# Patient Record
Sex: MALE | Race: WHITE | NOT HISPANIC OR LATINO | URBAN - METROPOLITAN AREA
[De-identification: names, ages, dates, MRNs, and addresses within clinical notes are randomized per-mention and may not be internally consistent; named-entity substitution may affect disease eponyms.]

---

## 2017-07-25 ENCOUNTER — OUTPATIENT (OUTPATIENT)
Dept: OUTPATIENT SERVICES | Facility: HOSPITAL | Age: 82
LOS: 1 days | End: 2017-07-25
Payer: MEDICARE

## 2017-07-25 VITALS
WEIGHT: 149.91 LBS | DIASTOLIC BLOOD PRESSURE: 63 MMHG | HEIGHT: 63 IN | SYSTOLIC BLOOD PRESSURE: 132 MMHG | OXYGEN SATURATION: 98 % | TEMPERATURE: 98 F | HEART RATE: 76 BPM | RESPIRATION RATE: 18 BRPM

## 2017-07-25 DIAGNOSIS — Z01.810 ENCOUNTER FOR PREPROCEDURAL CARDIOVASCULAR EXAMINATION: ICD-10-CM

## 2017-07-25 DIAGNOSIS — I25.10 ATHEROSCLEROTIC HEART DISEASE OF NATIVE CORONARY ARTERY WITHOUT ANGINA PECTORIS: ICD-10-CM

## 2017-07-25 LAB
ANION GAP SERPL CALC-SCNC: 14 MMOL/L — SIGNIFICANT CHANGE UP (ref 5–17)
APTT BLD: 34.9 SEC — SIGNIFICANT CHANGE UP (ref 27.5–37.4)
BASOPHILS # BLD AUTO: 0 K/UL — SIGNIFICANT CHANGE UP (ref 0–0.2)
BASOPHILS NFR BLD AUTO: 0.4 % — SIGNIFICANT CHANGE UP (ref 0–2)
BUN SERPL-MCNC: 21 MG/DL — HIGH (ref 8–20)
CALCIUM SERPL-MCNC: 9.1 MG/DL — SIGNIFICANT CHANGE UP (ref 8.6–10.2)
CHLORIDE SERPL-SCNC: 101 MMOL/L — SIGNIFICANT CHANGE UP (ref 98–107)
CO2 SERPL-SCNC: 26 MMOL/L — SIGNIFICANT CHANGE UP (ref 22–29)
CREAT SERPL-MCNC: 1.03 MG/DL — SIGNIFICANT CHANGE UP (ref 0.5–1.3)
EOSINOPHIL # BLD AUTO: 0.2 K/UL — SIGNIFICANT CHANGE UP (ref 0–0.5)
EOSINOPHIL NFR BLD AUTO: 2.5 % — SIGNIFICANT CHANGE UP (ref 0–5)
GLUCOSE SERPL-MCNC: 151 MG/DL — HIGH (ref 70–115)
HCT VFR BLD CALC: 41.2 % — LOW (ref 42–52)
HGB BLD-MCNC: 13.6 G/DL — LOW (ref 14–18)
INR BLD: 1.63 RATIO — HIGH (ref 0.88–1.16)
LYMPHOCYTES # BLD AUTO: 1.3 K/UL — SIGNIFICANT CHANGE UP (ref 1–4.8)
LYMPHOCYTES # BLD AUTO: 19.6 % — LOW (ref 20–55)
MCHC RBC-ENTMCNC: 30.2 PG — SIGNIFICANT CHANGE UP (ref 27–31)
MCHC RBC-ENTMCNC: 33 G/DL — SIGNIFICANT CHANGE UP (ref 32–36)
MCV RBC AUTO: 91.4 FL — SIGNIFICANT CHANGE UP (ref 80–94)
MONOCYTES # BLD AUTO: 0.7 K/UL — SIGNIFICANT CHANGE UP (ref 0–0.8)
MONOCYTES NFR BLD AUTO: 10.3 % — HIGH (ref 3–10)
NEUTROPHILS # BLD AUTO: 4.5 K/UL — SIGNIFICANT CHANGE UP (ref 1.8–8)
NEUTROPHILS NFR BLD AUTO: 67.1 % — SIGNIFICANT CHANGE UP (ref 37–73)
PLATELET # BLD AUTO: 164 K/UL — SIGNIFICANT CHANGE UP (ref 150–400)
POTASSIUM SERPL-MCNC: 4.6 MMOL/L — SIGNIFICANT CHANGE UP (ref 3.5–5.3)
POTASSIUM SERPL-SCNC: 4.6 MMOL/L — SIGNIFICANT CHANGE UP (ref 3.5–5.3)
PROTHROM AB SERPL-ACNC: 18.1 SEC — HIGH (ref 9.8–12.7)
RBC # BLD: 4.51 M/UL — LOW (ref 4.6–6.2)
RBC # FLD: 14.3 % — SIGNIFICANT CHANGE UP (ref 11–15.6)
SODIUM SERPL-SCNC: 141 MMOL/L — SIGNIFICANT CHANGE UP (ref 135–145)
WBC # BLD: 6.7 K/UL — SIGNIFICANT CHANGE UP (ref 4.8–10.8)
WBC # FLD AUTO: 6.7 K/UL — SIGNIFICANT CHANGE UP (ref 4.8–10.8)

## 2017-07-25 PROCEDURE — 85027 COMPLETE CBC AUTOMATED: CPT

## 2017-07-25 PROCEDURE — 93005 ELECTROCARDIOGRAM TRACING: CPT

## 2017-07-25 PROCEDURE — G0463: CPT

## 2017-07-25 PROCEDURE — 93010 ELECTROCARDIOGRAM REPORT: CPT

## 2017-07-25 PROCEDURE — 85730 THROMBOPLASTIN TIME PARTIAL: CPT

## 2017-07-25 PROCEDURE — 36415 COLL VENOUS BLD VENIPUNCTURE: CPT

## 2017-07-25 PROCEDURE — 85610 PROTHROMBIN TIME: CPT

## 2017-07-25 PROCEDURE — 80048 BASIC METABOLIC PNL TOTAL CA: CPT

## 2017-07-25 NOTE — ASU PATIENT PROFILE, ADULT - PMH
Afib    Arthritis    Diabetes mellitus    HTN (hypertension)    Hypertension    Pacemaker    Stented coronary artery  ELLIE x 1  (2011) -- [facility]  Trigger finger Afib    Arthritis    CAD (coronary artery disease)    Diabetes mellitus    LEON (dyspnea on exertion)    Fatigue    HTN (hypertension)    Hypertension    Myocardial ischemia    Pacemaker    Stented coronary artery  ELLIE x 1  (2011) -- [facility]  Stroke syndrome    Trigger finger

## 2017-07-25 NOTE — H&P PST ADULT - FAMILY HISTORY
Sibling  Still living? No  Family history of heart disease, Age at diagnosis: Age Unknown     Father  Still living? No  Family history of heart disease, Age at diagnosis: Age Unknown

## 2017-07-25 NOTE — H&P PST ADULT - ASSESSMENT
85 year old male with history of Afib, CAD with prior stent and mild to mod MR with EF 60% with c/o increased dyspnea.    Pt was instructed to stop coumadin 7/24/17 by Dr. Aldana.  NO bridge as per MD. For [facility] to assess coronary arteries.

## 2017-07-25 NOTE — H&P PST ADULT - PMH
Afib    Arthritis    CAD (coronary artery disease)    Diabetes mellitus    LEON (dyspnea on exertion)    Fatigue    HTN (hypertension)    Hypertension    Myocardial ischemia    Pacemaker    Stented coronary artery  ELLIE x 1  (2011) -- [facility]  Stroke syndrome    Trigger finger

## 2017-07-25 NOTE — ASU PATIENT PROFILE, ADULT - PSH
Pacemaker  2007  S/P angioplasty with stent    Status post trigger finger release  Multiple in the past

## 2017-07-25 NOTE — H&P PST ADULT - HISTORY OF PRESENT ILLNESS
85 year old male with history of Afib, CAD with prior cardiac stent, s/p PPM (BS), HL, DM,and TIA with c/o dyspnea.   Nuclear perfusion study positive with moderate ischemia moderated sized territory of the anterior wall.  Intermediate Risk Positive

## 2017-07-28 ENCOUNTER — OUTPATIENT (OUTPATIENT)
Dept: OUTPATIENT SERVICES | Facility: HOSPITAL | Age: 82
LOS: 1 days | End: 2017-07-28
Payer: MEDICARE

## 2017-07-28 ENCOUNTER — TRANSCRIPTION ENCOUNTER (OUTPATIENT)
Age: 82
End: 2017-07-28

## 2017-07-28 VITALS
RESPIRATION RATE: 16 BRPM | HEART RATE: 70 BPM | TEMPERATURE: 98 F | SYSTOLIC BLOOD PRESSURE: 160 MMHG | DIASTOLIC BLOOD PRESSURE: 70 MMHG | OXYGEN SATURATION: 98 %

## 2017-07-28 VITALS
RESPIRATION RATE: 16 BRPM | HEART RATE: 70 BPM | DIASTOLIC BLOOD PRESSURE: 65 MMHG | SYSTOLIC BLOOD PRESSURE: 137 MMHG | OXYGEN SATURATION: 96 %

## 2017-07-28 DIAGNOSIS — Z95.5 PRESENCE OF CORONARY ANGIOPLASTY IMPLANT AND GRAFT: ICD-10-CM

## 2017-07-28 DIAGNOSIS — I25.10 ATHEROSCLEROTIC HEART DISEASE OF NATIVE CORONARY ARTERY WITHOUT ANGINA PECTORIS: ICD-10-CM

## 2017-07-28 DIAGNOSIS — I10 ESSENTIAL (PRIMARY) HYPERTENSION: ICD-10-CM

## 2017-07-28 DIAGNOSIS — E11.9 TYPE 2 DIABETES MELLITUS WITHOUT COMPLICATIONS: ICD-10-CM

## 2017-07-28 DIAGNOSIS — Z01.810 ENCOUNTER FOR PREPROCEDURAL CARDIOVASCULAR EXAMINATION: ICD-10-CM

## 2017-07-28 DIAGNOSIS — I20.9 ANGINA PECTORIS, UNSPECIFIED: ICD-10-CM

## 2017-07-28 LAB
ALBUMIN SERPL ELPH-MCNC: 3.8 G/DL — SIGNIFICANT CHANGE UP (ref 3.3–5.2)
ALP SERPL-CCNC: 94 U/L — SIGNIFICANT CHANGE UP (ref 40–120)
ALT FLD-CCNC: 12 U/L — SIGNIFICANT CHANGE UP
APTT BLD: 34.3 SEC — SIGNIFICANT CHANGE UP (ref 27.5–37.4)
AST SERPL-CCNC: 17 U/L — SIGNIFICANT CHANGE UP
BILIRUB DIRECT SERPL-MCNC: 0.2 MG/DL — SIGNIFICANT CHANGE UP (ref 0–0.3)
BILIRUB INDIRECT FLD-MCNC: 1.1 MG/DL — HIGH (ref 0.2–1)
BILIRUB SERPL-MCNC: 1.3 MG/DL — SIGNIFICANT CHANGE UP (ref 0.4–2)
HBA1C BLD-MCNC: 7.5 % — HIGH (ref 4–5.6)
INR BLD: 1.19 RATIO — HIGH (ref 0.88–1.16)
NT-PROBNP SERPL-SCNC: 1919 PG/ML — HIGH (ref 0–300)
PROT SERPL-MCNC: 6.3 G/DL — LOW (ref 6.6–8.7)
PROTHROM AB SERPL-ACNC: 13.1 SEC — HIGH (ref 9.8–12.7)
TSH SERPL-MCNC: 3.19 UIU/ML — SIGNIFICANT CHANGE UP (ref 0.27–4.2)

## 2017-07-28 PROCEDURE — 83880 ASSAY OF NATRIURETIC PEPTIDE: CPT

## 2017-07-28 PROCEDURE — C1887: CPT

## 2017-07-28 PROCEDURE — 84443 ASSAY THYROID STIM HORMONE: CPT

## 2017-07-28 PROCEDURE — 93880 EXTRACRANIAL BILAT STUDY: CPT

## 2017-07-28 PROCEDURE — 83036 HEMOGLOBIN GLYCOSYLATED A1C: CPT

## 2017-07-28 PROCEDURE — C1760: CPT

## 2017-07-28 PROCEDURE — 36415 COLL VENOUS BLD VENIPUNCTURE: CPT

## 2017-07-28 PROCEDURE — 80076 HEPATIC FUNCTION PANEL: CPT

## 2017-07-28 PROCEDURE — C1769: CPT

## 2017-07-28 PROCEDURE — 93458 L HRT ARTERY/VENTRICLE ANGIO: CPT

## 2017-07-28 PROCEDURE — 85610 PROTHROMBIN TIME: CPT

## 2017-07-28 PROCEDURE — C1894: CPT

## 2017-07-28 PROCEDURE — 85730 THROMBOPLASTIN TIME PARTIAL: CPT

## 2017-07-28 PROCEDURE — 93306 TTE W/DOPPLER COMPLETE: CPT | Mod: 26

## 2017-07-28 PROCEDURE — 93880 EXTRACRANIAL BILAT STUDY: CPT | Mod: 26

## 2017-07-28 PROCEDURE — 93306 TTE W/DOPPLER COMPLETE: CPT

## 2017-07-28 RX ORDER — RIVAROXABAN 15 MG-20MG
1 KIT ORAL
Qty: 30 | Refills: 0 | OUTPATIENT
Start: 2017-07-28 | End: 2017-08-27

## 2017-07-28 RX ORDER — RIVAROXABAN 15 MG-20MG
20 KIT ORAL EVERY 24 HOURS
Qty: 0 | Refills: 0 | Status: DISCONTINUED | OUTPATIENT
Start: 2017-07-28 | End: 2017-08-12

## 2017-07-28 RX ADMIN — RIVAROXABAN 20 MILLIGRAM(S): KIT at 20:18

## 2017-07-28 NOTE — CONSULT NOTE ADULT - SUBJECTIVE AND OBJECTIVE BOX
Surgeon: Duong     Consult requesting by: Katya     HISTORY OF PRESENT ILLNESS:  85 year old male with history of Afib, CAD with prior cardiac stent, s/p PPM (BS), HL, DM,and TIA with c/o dyspnea.   Nuclear perfusion study positive with moderate ischemia moderated sized territory of the anterior wall.    Pt had a CATH today revealing MVD.        PAST MEDICAL & SURGICAL HISTORY:  Stroke syndrome  CAD (coronary artery disease)  LEON (dyspnea on exertion)  Myocardial ischemia  Fatigue  Hypertension  Stented coronary artery: ELLIE x 1  (2011) -- [facility]  Arthritis  Trigger finger  Pacemaker  HTN (hypertension)  Diabetes mellitus  Afib  Status post trigger finger release: Multiple in the past  S/P angioplasty with stent  Pacemaker: 2007      MEDICATIONS  (STANDING):    MEDICATIONS  (PRN):    Antiplatelet therapy:       xarelto                      Last dose/amt: today     Allergies    No Known Allergies    Intolerances    epinephrine (Other)      SOCIAL HISTORY:  Smoker: [ ] Yes  [ ] No        PACK YEARS:                         WHEN QUIT?  ETOH use: [ ] Yes  [ ] No              FREQUENCY / QUANTITY:  Ilicit Drug use:  [ ] Yes  [ ] No  Occupation:  Live with:  Assisted device use:    FAMILY HISTORY:  Family history of heart disease (Sibling, Father)      Review of Systems  CONSTITUTIONAL:  Fevers[ ] chills[ ] sweats[ ] fatigue[ ] weight loss[ ] weight gain [ ]                                     NEGATIVE [ ]   NEURO:  parathesias[ ] seizures [ ]  syncope [ ]  confusion [ ]                                                                                NEGATIVE[ ]   EYES: glasses[ ]  blurry vision[ ]  discharge[ ] pain[ ] glaucoma [ ]                                                                          NEGATIVE[ ]   ENMT:  difficulty hearing [ ]  vertigo[ ]  dysphagia[ ] epistaxis[ ] recent dental work [ ]                                    NEGATIVE[ ]   CV:  chest pain[ ] palpitations[ ] LEON [ ] diaphoresis [ ]                                                                                           NEGATIVE[ ]   RESPIRATORY:  wheezing[ ] SOB[ ] cough [ ] sputum[ ] hemoptysis[ ]                                                                  NEGATIVE[ ]   GI:  nausea[ ]  vommiting [ ]  diarrhea[ ] constipation [ ] melena [ ]                                                                      NEGATIVE[ ]   : hematuria[ ]  dysuria[ ] urgency[ ] incontinence[ ]                                                                                            NEGATIVE[ ]   MUSKULOSKELETAL:  arthritis[ ]  joint swelling [ ] muscle weakness [ ]                                                                NEGATIVE[ ]   SKIN/BREAST:  rash[ ] itching [ ]  hair loss[ ] masses[ ]                                                                                              NEGATIVE[ ]   PSYCH:  dementia [ ] depresion [ ] anxiety[ ]                                                                                                               NEGATIVE[ ]   HEME/LYMPH:  bruises easily[ ] enlarged lymph nodes[ ] tender lymph nodes[ ]                                               NEGATIVE[ ]   ENDOCRINE:  cold intolerance[ ] heat intolerance[ ] polydipsia[ ]                                                                          NEGATIVE[ ]     PHYSICAL EXAM  Vital Signs Last 24 Hrs  T(C): 36.7 (28 Jul 2017 13:15), Max: 36.7 (28 Jul 2017 13:15)  T(F): 98.1 (28 Jul 2017 13:15), Max: 98.1 (28 Jul 2017 13:15)  HR: 70 (28 Jul 2017 17:15) (70 - 76)  BP: 129/66 (28 Jul 2017 17:15) (117/59 - 160/70)  BP(mean): --  RR: 16 (28 Jul 2017 17:15) (16 - 18)  SpO2: 94% (28 Jul 2017 17:15) (94% - 98%)    CONSTITUTIONAL:                                                                          WNL[ ]   Neuro: WNL[ ] Normal exam oriented to person/place & time with no focal motor or sensory  deficits. Other                     Eyes: WNL[ ]   Normal exam of conjunctiva & lids, pupils equally reactive. Other     ENT: WNL[ ]    Normal exam of nasal/oral mucosa with absence of cyanosis. Other  Neck: WNL[ ]  Normal exam of jugular veins, trachea & thyroid. Other  Chest: WNL[ ] Normal lung exam with good air movement absence of wheezes, rales, or rhonchi: Other                                                                                CV:  Auscultation: normal [ ] S3[ ] S4[ ] Irregular [ ] Rub[ ] Clicks[ ]    Murmurs none:[ ]systolic [ ]  diastolic [ ] holosystolic [ ]  Carotids: No Bruits[ ] Other                 Abdominal Aorta: normal [ ] nonpalpable[ ]Other                                                                                      GI:           WNL[ ] Normal exam of abdomen, liver & spleen with no noted masses or tenderness. Other                                                                                                        Extremities: WNL[ ] Normal no evidence of cyanosis or deformity Edema: none[ ]trace[ ]1+[ ]2+[ ]3+[ ]4+[ ]  Lower Extremity Pulses: Right[ ] Left[ ]Varicosities[ ]  SKIN :WNL[ ] Normal exam to inspection & palation. Other:                                                          LABS:                      Cardiac Cath:  < from: Cardiac Cath Lab - Adult (07.28.17 @ 15:11) >  VENTRICLES: There were no left ventricular global or regional wall motion  abnormalities. Global left ventricular function was normal. EF estimated  was 60 %.  CORONARY VESSELS: The coronary circulation is right dominant.  LAD:   --  Proximal LAD: There was a tubular 90 % stenosis at the site of a  prior stent. The lesion was without evidence of thrombus. There was SHANTHI  grade 3 flow through the vessel (brisk flow).  CX:   --  OM1: There was a tubular 90 % stenosis. The lesion was without  evidence of thrombus. There was SHANTHI grade 3 flow through the vessel  (brisk flow).  RCA:   --  Proximal RCA: There was a 100 % stenosis.  --  Distal RCA: The distal vessel was supplied by extensive collaterals  from the LAD and the circumflex.  COMPLICATIONS: No complications occurred during the cath lab visit.  INTERVENTIONAL RECOMMENDATIONS: Left heart catheterization demonstrated  severe in stent restenosis of the proximal LAD. There is a totally  occluded RCA with very good collaterals from the left system. TheLCX is  essentially a large OM1 with a severe proximal lesion.  The LVEF is normal at 60%.  Plan for CT surgery consultation for CABG.    < end of copied text >      TTE / DAMIEN:  Pending Surgeon: Duong     Consult requesting by: Katya     HISTORY OF PRESENT ILLNESS:  85 year old male with history of Afib, CAD with prior cardiac stent, s/p PPM (BS), HL, DM,and TIA with c/o dyspnea.   Nuclear perfusion study positive with moderate ischemia moderated sized territory of the anterior wall.    Pt had a CATH today revealing MVD.        PAST MEDICAL & SURGICAL HISTORY:  Stroke syndrome  CAD (coronary artery disease)  LEON (dyspnea on exertion)  Myocardial ischemia  Fatigue  Hypertension  Stented coronary artery: ELLIE x 1  (2011) -- [facility]  Arthritis  Trigger finger  Pacemaker  HTN (hypertension)  Diabetes mellitus  Afib  Status post trigger finger release: Multiple in the past  S/P angioplasty with stent  Pacemaker: 2007      MEDICATIONS  (STANDING):    MEDICATIONS  (PRN):    Antiplatelet therapy:       coumadin                    Last dose/amt: yesterday    Allergies    No Known Allergies    Intolerances    epinephrine (Other)      SOCIAL HISTORY:  Smoker: [ ] Yes  [x ] No        PACK YEARS:                         WHEN QUIT?  ETOH use: [ ] Yes  [x ] No              FREQUENCY / QUANTITY:  Ilicit Drug use:  [ ] Yes  [x ] No  Occupation: retired   Live with: wife   Assisted device use: no    FAMILY HISTORY:  Family history of heart disease (Sibling, Father)      Review of Systems  CONSTITUTIONAL:  Fevers[ ] chills[ ] sweats[ ] fatigue[ ] weight loss[ ] weight gain [ ]                                     NEGATIVE [ x]   NEURO:  parathesias[ ] seizures [ ]  syncope [ ]  confusion [ ]                                                                                NEGATIVE[x ]   EYES: glasses[ x]  blurry vision[ ]  discharge[ ] pain[ ] glaucoma [ ]                                                                          NEGATIVE[ ]   ENMT:  difficulty hearing [ ]  vertigo[ ]  dysphagia[ ] epistaxis[ ] recent dental work [ ]                                    NEGATIVE[x ]   CV:  chest pain[ ] palpitations[ ] LEON [ ] diaphoresis [ ]                                                                                           NEGATIVE[x ]   RESPIRATORY:  wheezing[ ] SOB[ ] cough [ ] sputum[ ] hemoptysis[ ]                                                                  NEGATIVE[x ]   GI:  nausea[ ]  vommiting [ ]  diarrhea[ ] constipation [ ] melena [ ]                                                                      NEGATIVE[x ]   : hematuria[ ]  dysuria[ ] urgency[ ] incontinence[ ]                                                                                            NEGATIVE[ x]   MUSKULOSKELETAL:  arthritis[ ]  joint swelling [ ] muscle weakness [ ]                                                                NEGATIVE[x ]   SKIN/BREAST:  rash[ ] itching [ ]  hair loss[ ] masses[ ]                                                                                              NEGATIVE[x ]   PSYCH:  dementia [ ] depresion [ ] anxiety[ ]                                                                                                               NEGATIVE[x ]   HEME/LYMPH:  bruises easily[ ] enlarged lymph nodes[ ] tender lymph nodes[ ]                                               NEGATIVE[ x]   ENDOCRINE:  cold intolerance[ ] heat intolerance[ ] polydipsia[ ]                                                                          NEGATIVE[x ]     PHYSICAL EXAM  Vital Signs Last 24 Hrs  T(C): 36.7 (28 Jul 2017 13:15), Max: 36.7 (28 Jul 2017 13:15)  T(F): 98.1 (28 Jul 2017 13:15), Max: 98.1 (28 Jul 2017 13:15)  HR: 70 (28 Jul 2017 17:15) (70 - 76)  BP: 129/66 (28 Jul 2017 17:15) (117/59 - 160/70)  BP(mean): --  RR: 16 (28 Jul 2017 17:15) (16 - 18)  SpO2: 94% (28 Jul 2017 17:15) (94% - 98%)    CONSTITUTIONAL:                                                                          WNL[ x]   Neuro: WNL[x ] Normal exam oriented to person/place & time with no focal motor or sensory  deficits. Other                     Eyes: WNL[x ]   Normal exam of conjunctiva & lids, pupils equally reactive. Other     ENT: WNL[x ]    Normal exam of nasal/oral mucosa with absence of cyanosis. Other  Neck: WNL[x ]  Normal exam of jugular veins, trachea & thyroid. Other  Chest: WNL[x ] Normal lung exam with good air movement absence of wheezes, rales, or rhonchi: Other                                                                                CV:  Auscultation: normal x[ ] S3[ ] S4[ ] Irregular [ ] Rub[ ] Clicks[ ]    Murmurs none:[ x]systolic [ ]  diastolic [ ] holosystolic [ ]  Carotids: No Bruits[x ] Other                 Abdominal Aorta: normal [ ] nonpalpable[ ]Other                                                                                      GI:           WNL[x ] Normal exam of abdomen, liver & spleen with no noted masses or tenderness. Other                                                                                                        Extremities: WNL[x ] Normal no evidence of cyanosis or deformity Edema: none[ ]trace[ ]1+[ ]2+[ ]3+[ ]4+[ ]  Lower Extremity Pulses: Right[2+ ] Left[2+ ]Varicosities[ ]  SKIN :WNL[x ] Normal exam to inspection & palation. Other:                                                          LABS:                      Cardiac Cath:  < from: Cardiac Cath Lab - Adult (07.28.17 @ 15:11) >  VENTRICLES: There were no left ventricular global or regional wall motion  abnormalities. Global left ventricular function was normal. EF estimated  was 60 %.  CORONARY VESSELS: The coronary circulation is right dominant.  LAD:   --  Proximal LAD: There was a tubular 90 % stenosis at the site of a  prior stent. The lesion was without evidence of thrombus. There was SHANTHI  grade 3 flow through the vessel (brisk flow).  CX:   --  OM1: There was a tubular 90 % stenosis. The lesion was without  evidence of thrombus. There was SHANTHI grade 3 flow through the vessel  (brisk flow).  RCA:   --  Proximal RCA: There was a 100 % stenosis.  --  Distal RCA: The distal vessel was supplied by extensive collaterals  from the LAD and the circumflex.  COMPLICATIONS: No complications occurred during the cath lab visit.  INTERVENTIONAL RECOMMENDATIONS: Left heart catheterization demonstrated  severe in stent restenosis of the proximal LAD. There is a totally  occluded RCA with very good collaterals from the left system. TheLCX is  essentially a large OM1 with a severe proximal lesion.  The LVEF is normal at 60%.  Plan for CT surgery consultation for CABG.    < end of copied text >      TTE / DAMIEN:  Pending

## 2017-07-28 NOTE — DISCHARGE NOTE ADULT - PLAN OF CARE
optimize cardiac health follow up with CTS No heavy lifting, driving, sex, tub baths, swimming, or any activity that submerges the lower half of the body in water for 48 hours.  Limited walking and stairs for 48 hours.    Change the bandaid after 24 hours and every 24 hours after that.  Keep the puncture site dry and covered with a bandaid until a scab forms.    Observe the site frequently.  If bleeding or a large lump (the size of a golf ball or bigger) occurs lie flat, apply continuous direct pressure just above the puncture site for at least 10 minutes, and notify your physician immediately.  If the bleeding cannot be controlled, call 911 immediately for assistance.  Notify your physician of pain, swelling or any drainage.    Notify your physician immediately if coldness, numbness, discoloration or pain in your foot occurs.

## 2017-07-28 NOTE — DISCHARGE NOTE ADULT - HOSPITAL COURSE
86 yo male history of afib abnormal NST POST CARDIAC CATH  3 Vessel CAD... CTS consult ...... Start Xarelto one tablet daily stop Coumadin. 86 yo male history of afib abnormal NST POST CARDIAC CATH  3 Vessel CAD... CTS consult ...... Start Xarelto one tablet daily x 2 days(Friday and Saturday as per Dr. Watters) and stop Coumadin.

## 2017-07-28 NOTE — DISCHARGE NOTE ADULT - MEDICATION SUMMARY - MEDICATIONS TO TAKE
I will START or STAY ON the medications listed below when I get home from the hospital:    aspirin 81 mg oral delayed release tablet  -- 1 tab(s) by mouth once a day  -- Indication: For heart health    losartan 50 mg oral tablet  -- 1 tab(s) by mouth once a day  -- Indication: For  bp    digoxin 125 mcg (0.125 mg) oral tablet  -- 1 tab(s) by mouth once a day  -- Indication: For heart health    Cardizem 240 mg/24 hours oral capsule, extended release  -- 1 cap(s) by mouth once a day  -- Indication: For  bp    Coumadin 5 mg oral tablet  -- 1 tab(s) by mouth once a day  -- Indication: For blood thinner    Glucophage 1000 mg oral tablet  -- 1 tab(s) by mouth 2 times a day  -- Indication: For diabetes    glipiZIDE 10 mg oral tablet  --  by mouth 2 times a day  -- Indication: For diabetes    Zocor 10 mg oral tablet  -- 1 tab(s) by mouth once a day (at bedtime)  -- Indication: For cholestrol    Lopressor 100 mg oral tablet  -- 1 tab(s) by mouth 2 times a day  -- Indication: For bp

## 2017-07-28 NOTE — DISCHARGE NOTE ADULT - PATIENT PORTAL LINK FT
“You can access the [facility] Patient Portal, offered by [facility], by registering with the following website: [email or web address]”

## 2017-07-28 NOTE — DISCHARGE NOTE ADULT - CARE PLAN
Principal Discharge DX:	CAD (coronary artery disease)  Goal:	optimize cardiac health follow up with CTS  Instructions for follow-up, activity and diet:	No heavy lifting, driving, sex, tub baths, swimming, or any activity that submerges the lower half of the body in water for 48 hours.  Limited walking and stairs for 48 hours.    Change the bandaid after 24 hours and every 24 hours after that.  Keep the puncture site dry and covered with a bandaid until a scab forms.    Observe the site frequently.  If bleeding or a large lump (the size of a golf ball or bigger) occurs lie flat, apply continuous direct pressure just above the puncture site for at least 10 minutes, and notify your physician immediately.  If the bleeding cannot be controlled, call 911 immediately for assistance.  Notify your physician of pain, swelling or any drainage.    Notify your physician immediately if coldness, numbness, discoloration or pain in your foot occurs.

## 2017-07-28 NOTE — DISCHARGE NOTE ADULT - CARE PROVIDER_API CALL
Nick Aldana (MD), Cardiovascular Disease  [address] [place] [address]  Phone: [phone]  Fax: [phone]

## 2017-07-28 NOTE — CONSULT NOTE ADULT - PROBLEM SELECTOR RECOMMENDATION 9
Pre op work up in progress   plan to be discharged home and plan for OR Wednesday Pre op work up in progress   plan to be discharged home and plan for OR Wednesday  Pt was given Hibiclens wash with instructions   Told Pt Radha will call Monday to give further instructions for SDA for the OR

## 2017-08-01 PROBLEM — R53.83 OTHER FATIGUE: Chronic | Status: ACTIVE | Noted: 2017-07-25

## 2017-08-01 PROBLEM — I25.9 CHRONIC ISCHEMIC HEART DISEASE, UNSPECIFIED: Chronic | Status: ACTIVE | Noted: 2017-07-25

## 2017-08-01 PROBLEM — R06.09 OTHER FORMS OF DYSPNEA: Chronic | Status: ACTIVE | Noted: 2017-07-25

## 2017-08-01 PROBLEM — I25.10 ATHEROSCLEROTIC HEART DISEASE OF NATIVE CORONARY ARTERY WITHOUT ANGINA PECTORIS: Chronic | Status: ACTIVE | Noted: 2017-07-25

## 2017-08-01 PROBLEM — I63.9 CEREBRAL INFARCTION, UNSPECIFIED: Chronic | Status: ACTIVE | Noted: 2017-07-25

## 2017-08-01 RX ORDER — CEFUROXIME AXETIL 250 MG
1500 TABLET ORAL ONCE
Qty: 0 | Refills: 0 | Status: DISCONTINUED | OUTPATIENT
Start: 2017-08-02 | End: 2017-08-02

## 2017-08-01 NOTE — ASU PATIENT PROFILE, ADULT - LEARNING ASSESSMENT (PATIENT) ADDITIONAL COMMENTS
telephone interview - pre-op instructions reviewed. Pt has surgical wash & started 7/31/17. pain management reviewed pt verbalized understanding

## 2017-08-01 NOTE — ASU PATIENT PROFILE, ADULT - PMH
Afib    Arthritis    CAD (coronary artery disease)    Diabetes mellitus    LEON (dyspnea on exertion)    Fatigue    HTN (hypertension)    Hypertension    Myocardial ischemia    Pacemaker    Stented coronary artery  ELLIE x 1  (2011) -- [facility]  Stroke syndrome    Trigger finger

## 2017-08-02 ENCOUNTER — APPOINTMENT (OUTPATIENT)
Dept: CARDIOTHORACIC SURGERY | Facility: HOSPITAL | Age: 82
End: 2017-08-02
Payer: MEDICARE

## 2017-08-02 ENCOUNTER — INPATIENT (INPATIENT)
Facility: HOSPITAL | Age: 82
LOS: 14 days | Discharge: HOME | DRG: 235 | End: 2017-08-17
Attending: THORACIC SURGERY (CARDIOTHORACIC VASCULAR SURGERY) | Admitting: THORACIC SURGERY (CARDIOTHORACIC VASCULAR SURGERY)
Payer: MEDICARE

## 2017-08-02 VITALS
HEIGHT: 63 IN | RESPIRATION RATE: 16 BRPM | HEART RATE: 72 BPM | TEMPERATURE: 97 F | SYSTOLIC BLOOD PRESSURE: 122 MMHG | OXYGEN SATURATION: 99 % | DIASTOLIC BLOOD PRESSURE: 55 MMHG | WEIGHT: 149.91 LBS

## 2017-08-02 DIAGNOSIS — I25.10 ATHEROSCLEROTIC HEART DISEASE OF NATIVE CORONARY ARTERY WITHOUT ANGINA PECTORIS: ICD-10-CM

## 2017-08-02 LAB
ABO RH CONFIRMATION: SIGNIFICANT CHANGE UP
ALBUMIN SERPL ELPH-MCNC: 3.3 G/DL — SIGNIFICANT CHANGE UP (ref 3.3–5.2)
ALLERGY+IMMUNOLOGY DIAG STUDY NOTE: SIGNIFICANT CHANGE UP
ALP SERPL-CCNC: 65 U/L — SIGNIFICANT CHANGE UP (ref 40–120)
ALT FLD-CCNC: 9 U/L — SIGNIFICANT CHANGE UP
ANION GAP SERPL CALC-SCNC: 17 MMOL/L — SIGNIFICANT CHANGE UP (ref 5–17)
ANTIBODY INTERPRETATION 2: SIGNIFICANT CHANGE UP
APTT BLD: 26.2 SEC — LOW (ref 27.5–37.4)
AST SERPL-CCNC: 19 U/L — SIGNIFICANT CHANGE UP
BILIRUB SERPL-MCNC: 1.7 MG/DL — SIGNIFICANT CHANGE UP (ref 0.4–2)
BLD GP AB SCN SERPL QL: ABNORMAL
BUN SERPL-MCNC: 24 MG/DL — HIGH (ref 8–20)
CALCIUM SERPL-MCNC: 7.9 MG/DL — LOW (ref 8.6–10.2)
CHLORIDE SERPL-SCNC: 103 MMOL/L — SIGNIFICANT CHANGE UP (ref 98–107)
CK SERPL-CCNC: 128 U/L — SIGNIFICANT CHANGE UP (ref 30–200)
CO2 SERPL-SCNC: 19 MMOL/L — LOW (ref 22–29)
CREAT SERPL-MCNC: 0.69 MG/DL — SIGNIFICANT CHANGE UP (ref 0.5–1.3)
DIR ANTIGLOB POLYSPECIFIC INTERPRETATION: SIGNIFICANT CHANGE UP
GAS PNL BLDA: SIGNIFICANT CHANGE UP
GLUCOSE SERPL-MCNC: 203 MG/DL — HIGH (ref 70–115)
HCT VFR BLD CALC: 32.5 % — LOW (ref 42–52)
HGB BLD-MCNC: 10.8 G/DL — LOW (ref 14–18)
INR BLD: 1.32 RATIO — HIGH (ref 0.88–1.16)
MAGNESIUM SERPL-MCNC: 2.6 MG/DL — SIGNIFICANT CHANGE UP (ref 1.6–2.6)
MCHC RBC-ENTMCNC: 30.3 PG — SIGNIFICANT CHANGE UP (ref 27–31)
MCHC RBC-ENTMCNC: 33.2 G/DL — SIGNIFICANT CHANGE UP (ref 32–36)
MCV RBC AUTO: 91.3 FL — SIGNIFICANT CHANGE UP (ref 80–94)
PLATELET # BLD AUTO: 127 K/UL — LOW (ref 150–400)
POTASSIUM SERPL-MCNC: 4.4 MMOL/L — SIGNIFICANT CHANGE UP (ref 3.5–5.3)
POTASSIUM SERPL-SCNC: 4.4 MMOL/L — SIGNIFICANT CHANGE UP (ref 3.5–5.3)
PROT SERPL-MCNC: 5.1 G/DL — LOW (ref 6.6–8.7)
PROTHROM AB SERPL-ACNC: 14.6 SEC — HIGH (ref 9.8–12.7)
RBC # BLD: 3.56 M/UL — LOW (ref 4.6–6.2)
RBC # FLD: 14 % — SIGNIFICANT CHANGE UP (ref 11–15.6)
SODIUM SERPL-SCNC: 139 MMOL/L — SIGNIFICANT CHANGE UP (ref 135–145)
TROPONIN T SERPL-MCNC: 0.24 NG/ML — HIGH (ref 0–0.06)
TYPE + AB SCN PNL BLD: SIGNIFICANT CHANGE UP
WBC # BLD: 15.3 K/UL — HIGH (ref 4.8–10.8)
WBC # FLD AUTO: 15.3 K/UL — HIGH (ref 4.8–10.8)

## 2017-08-02 PROCEDURE — 33518 CABG ARTERY-VEIN TWO: CPT | Mod: AS

## 2017-08-02 PROCEDURE — 33533 CABG ARTERIAL SINGLE: CPT | Mod: AS

## 2017-08-02 PROCEDURE — 86077 PHYS BLOOD BANK SERV XMATCH: CPT

## 2017-08-02 PROCEDURE — 71010: CPT | Mod: 26

## 2017-08-02 PROCEDURE — 33533 CABG ARTERIAL SINGLE: CPT

## 2017-08-02 PROCEDURE — 33518 CABG ARTERY-VEIN TWO: CPT

## 2017-08-02 PROCEDURE — 93010 ELECTROCARDIOGRAM REPORT: CPT

## 2017-08-02 PROCEDURE — 71010: CPT | Mod: 26,77

## 2017-08-02 PROCEDURE — 99223 1ST HOSP IP/OBS HIGH 75: CPT | Mod: 57

## 2017-08-02 RX ORDER — FENTANYL CITRATE 50 UG/ML
25 INJECTION INTRAVENOUS ONCE
Qty: 0 | Refills: 0 | Status: DISCONTINUED | OUTPATIENT
Start: 2017-08-02 | End: 2017-08-02

## 2017-08-02 RX ORDER — POTASSIUM CHLORIDE 20 MEQ
10 PACKET (EA) ORAL ONCE
Qty: 0 | Refills: 0 | Status: DISCONTINUED | OUTPATIENT
Start: 2017-08-02 | End: 2017-08-03

## 2017-08-02 RX ORDER — POTASSIUM CHLORIDE 20 MEQ
10 PACKET (EA) ORAL
Qty: 0 | Refills: 0 | Status: COMPLETED | OUTPATIENT
Start: 2017-08-02

## 2017-08-02 RX ORDER — INSULIN HUMAN 100 [IU]/ML
1 INJECTION, SOLUTION SUBCUTANEOUS
Qty: 100 | Refills: 0 | Status: DISCONTINUED | OUTPATIENT
Start: 2017-08-02 | End: 2017-08-04

## 2017-08-02 RX ORDER — SODIUM CHLORIDE 9 MG/ML
1000 INJECTION, SOLUTION INTRAVENOUS ONCE
Qty: 0 | Refills: 0 | Status: COMPLETED | OUTPATIENT
Start: 2017-08-02 | End: 2017-08-02

## 2017-08-02 RX ORDER — DOCUSATE SODIUM 100 MG
100 CAPSULE ORAL THREE TIMES A DAY
Qty: 0 | Refills: 0 | Status: DISCONTINUED | OUTPATIENT
Start: 2017-08-02 | End: 2017-08-17

## 2017-08-02 RX ORDER — POTASSIUM CHLORIDE 20 MEQ
10 PACKET (EA) ORAL
Qty: 0 | Refills: 0 | Status: COMPLETED | OUTPATIENT
Start: 2017-08-02 | End: 2017-08-03

## 2017-08-02 RX ORDER — POTASSIUM CHLORIDE 20 MEQ
10 PACKET (EA) ORAL
Qty: 0 | Refills: 0 | Status: COMPLETED | OUTPATIENT
Start: 2017-08-02 | End: 2017-08-02

## 2017-08-02 RX ORDER — DOBUTAMINE HCL 250MG/20ML
3 VIAL (ML) INTRAVENOUS
Qty: 500 | Refills: 0 | Status: DISCONTINUED | OUTPATIENT
Start: 2017-08-02 | End: 2017-08-03

## 2017-08-02 RX ORDER — ALBUMIN HUMAN 25 %
250 VIAL (ML) INTRAVENOUS ONCE
Qty: 0 | Refills: 0 | Status: COMPLETED | OUTPATIENT
Start: 2017-08-02 | End: 2017-08-02

## 2017-08-02 RX ORDER — MUPIROCIN 20 MG/G
1 OINTMENT TOPICAL
Qty: 0 | Refills: 0 | Status: COMPLETED | OUTPATIENT
Start: 2017-08-02 | End: 2017-08-07

## 2017-08-02 RX ORDER — ACETAMINOPHEN 500 MG
1000 TABLET ORAL ONCE
Qty: 0 | Refills: 0 | Status: COMPLETED | OUTPATIENT
Start: 2017-08-02 | End: 2017-08-02

## 2017-08-02 RX ORDER — MEPERIDINE HYDROCHLORIDE 50 MG/ML
25 INJECTION INTRAMUSCULAR; INTRAVENOUS; SUBCUTANEOUS ONCE
Qty: 0 | Refills: 0 | Status: DISCONTINUED | OUTPATIENT
Start: 2017-08-02 | End: 2017-08-03

## 2017-08-02 RX ORDER — CEFUROXIME AXETIL 250 MG
1500 TABLET ORAL EVERY 8 HOURS
Qty: 0 | Refills: 0 | Status: COMPLETED | OUTPATIENT
Start: 2017-08-02 | End: 2017-08-04

## 2017-08-02 RX ORDER — ASPIRIN/CALCIUM CARB/MAGNESIUM 324 MG
325 TABLET ORAL ONCE
Qty: 0 | Refills: 0 | Status: COMPLETED | OUTPATIENT
Start: 2017-08-02 | End: 2017-08-03

## 2017-08-02 RX ORDER — VASOPRESSIN 20 [USP'U]/ML
0.05 INJECTION INTRAVENOUS
Qty: 100 | Refills: 0 | Status: DISCONTINUED | OUTPATIENT
Start: 2017-08-02 | End: 2017-08-03

## 2017-08-02 RX ORDER — FENTANYL CITRATE 50 UG/ML
50 INJECTION INTRAVENOUS ONCE
Qty: 0 | Refills: 0 | Status: DISCONTINUED | OUTPATIENT
Start: 2017-08-02 | End: 2017-08-02

## 2017-08-02 RX ORDER — SODIUM CHLORIDE 9 MG/ML
500 INJECTION, SOLUTION INTRAVENOUS
Qty: 0 | Refills: 0 | Status: COMPLETED | OUTPATIENT
Start: 2017-08-02 | End: 2017-08-02

## 2017-08-02 RX ORDER — NOREPINEPHRINE BITARTRATE/D5W 8 MG/250ML
0.01 PLASTIC BAG, INJECTION (ML) INTRAVENOUS
Qty: 8 | Refills: 0 | Status: DISCONTINUED | OUTPATIENT
Start: 2017-08-02 | End: 2017-08-03

## 2017-08-02 RX ORDER — CLOPIDOGREL BISULFATE 75 MG/1
75 TABLET, FILM COATED ORAL ONCE
Qty: 0 | Refills: 0 | Status: COMPLETED | OUTPATIENT
Start: 2017-08-02 | End: 2017-08-03

## 2017-08-02 RX ORDER — CLOPIDOGREL BISULFATE 75 MG/1
75 TABLET, FILM COATED ORAL DAILY
Qty: 0 | Refills: 0 | Status: DISCONTINUED | OUTPATIENT
Start: 2017-08-03 | End: 2017-08-04

## 2017-08-02 RX ORDER — DEXTROSE 50 % IN WATER 50 %
50 SYRINGE (ML) INTRAVENOUS
Qty: 0 | Refills: 0 | Status: DISCONTINUED | OUTPATIENT
Start: 2017-08-02 | End: 2017-08-17

## 2017-08-02 RX ORDER — VASOPRESSIN 20 [USP'U]/ML
0.03 INJECTION INTRAVENOUS
Qty: 100 | Refills: 0 | Status: DISCONTINUED | OUTPATIENT
Start: 2017-08-02 | End: 2017-08-02

## 2017-08-02 RX ORDER — VANCOMYCIN HCL 1 G
1000 VIAL (EA) INTRAVENOUS EVERY 12 HOURS
Qty: 0 | Refills: 0 | Status: COMPLETED | OUTPATIENT
Start: 2017-08-02 | End: 2017-08-04

## 2017-08-02 RX ORDER — ASPIRIN/CALCIUM CARB/MAGNESIUM 324 MG
325 TABLET ORAL DAILY
Qty: 0 | Refills: 0 | Status: DISCONTINUED | OUTPATIENT
Start: 2017-08-03 | End: 2017-08-04

## 2017-08-02 RX ORDER — POTASSIUM CHLORIDE 20 MEQ
10 PACKET (EA) ORAL
Qty: 0 | Refills: 0 | Status: DISCONTINUED | OUTPATIENT
Start: 2017-08-02 | End: 2017-08-03

## 2017-08-02 RX ORDER — DEXTROSE 50 % IN WATER 50 %
25 SYRINGE (ML) INTRAVENOUS
Qty: 0 | Refills: 0 | Status: DISCONTINUED | OUTPATIENT
Start: 2017-08-02 | End: 2017-08-17

## 2017-08-02 RX ORDER — SODIUM CHLORIDE 9 MG/ML
1000 INJECTION INTRAMUSCULAR; INTRAVENOUS; SUBCUTANEOUS
Qty: 0 | Refills: 0 | Status: DISCONTINUED | OUTPATIENT
Start: 2017-08-02 | End: 2017-08-04

## 2017-08-02 RX ORDER — PANTOPRAZOLE SODIUM 20 MG/1
40 TABLET, DELAYED RELEASE ORAL DAILY
Qty: 0 | Refills: 0 | Status: DISCONTINUED | OUTPATIENT
Start: 2017-08-02 | End: 2017-08-04

## 2017-08-02 RX ADMIN — Medication 250 MILLIGRAM(S): at 23:18

## 2017-08-02 RX ADMIN — Medication 125 MILLILITER(S): at 18:19

## 2017-08-02 RX ADMIN — PANTOPRAZOLE SODIUM 40 MILLIGRAM(S): 20 TABLET, DELAYED RELEASE ORAL at 18:31

## 2017-08-02 RX ADMIN — FENTANYL CITRATE 50 MICROGRAM(S): 50 INJECTION INTRAVENOUS at 23:45

## 2017-08-02 RX ADMIN — Medication 100 MILLIEQUIVALENT(S): at 17:58

## 2017-08-02 RX ADMIN — SODIUM CHLORIDE 4000 MILLILITER(S): 9 INJECTION, SOLUTION INTRAVENOUS at 18:23

## 2017-08-02 RX ADMIN — Medication 100 MILLIEQUIVALENT(S): at 20:59

## 2017-08-02 RX ADMIN — Medication 125 MILLILITER(S): at 21:11

## 2017-08-02 RX ADMIN — Medication 10.2 MICROGRAM(S)/KG/MIN: at 21:50

## 2017-08-02 RX ADMIN — Medication 1 MICROGRAM(S)/KG/MIN: at 21:51

## 2017-08-02 RX ADMIN — Medication 100 MILLIEQUIVALENT(S): at 23:18

## 2017-08-02 RX ADMIN — INSULIN HUMAN 1 UNIT(S)/HR: 100 INJECTION, SOLUTION SUBCUTANEOUS at 18:25

## 2017-08-02 RX ADMIN — Medication 1000 MILLIGRAM(S): at 22:05

## 2017-08-02 RX ADMIN — VASOPRESSIN 2 UNIT(S)/MIN: 20 INJECTION INTRAVENOUS at 17:39

## 2017-08-02 RX ADMIN — SODIUM CHLORIDE 5 MILLILITER(S): 9 INJECTION INTRAMUSCULAR; INTRAVENOUS; SUBCUTANEOUS at 18:39

## 2017-08-02 RX ADMIN — Medication 100 MILLIEQUIVALENT(S): at 21:36

## 2017-08-02 RX ADMIN — FENTANYL CITRATE 25 MICROGRAM(S): 50 INJECTION INTRAVENOUS at 21:10

## 2017-08-02 RX ADMIN — Medication 125 MILLILITER(S): at 19:44

## 2017-08-02 RX ADMIN — FENTANYL CITRATE 25 MICROGRAM(S): 50 INJECTION INTRAVENOUS at 21:20

## 2017-08-02 RX ADMIN — Medication 10.2 MICROGRAM(S)/KG/MIN: at 18:10

## 2017-08-02 RX ADMIN — FENTANYL CITRATE 25 MICROGRAM(S): 50 INJECTION INTRAVENOUS at 22:05

## 2017-08-02 RX ADMIN — VASOPRESSIN 3 UNIT(S)/MIN: 20 INJECTION INTRAVENOUS at 21:50

## 2017-08-02 RX ADMIN — FENTANYL CITRATE 50 MICROGRAM(S): 50 INJECTION INTRAVENOUS at 23:30

## 2017-08-02 RX ADMIN — INSULIN HUMAN 1 UNIT(S)/HR: 100 INJECTION, SOLUTION SUBCUTANEOUS at 21:51

## 2017-08-02 RX ADMIN — SODIUM CHLORIDE 10 MILLILITER(S): 9 INJECTION INTRAMUSCULAR; INTRAVENOUS; SUBCUTANEOUS at 18:39

## 2017-08-02 RX ADMIN — Medication 125 MILLILITER(S): at 23:30

## 2017-08-02 RX ADMIN — Medication 100 MILLIEQUIVALENT(S): at 18:20

## 2017-08-02 RX ADMIN — Medication 100 MILLIGRAM(S): at 19:44

## 2017-08-02 RX ADMIN — Medication 1 MICROGRAM(S)/KG/MIN: at 17:39

## 2017-08-02 RX ADMIN — MUPIROCIN 1 APPLICATION(S): 20 OINTMENT TOPICAL at 19:45

## 2017-08-02 RX ADMIN — FENTANYL CITRATE 25 MICROGRAM(S): 50 INJECTION INTRAVENOUS at 21:49

## 2017-08-02 RX ADMIN — Medication 400 MILLIGRAM(S): at 21:50

## 2017-08-02 NOTE — BRIEF OPERATIVE NOTE - PROCEDURE
CABG, with internal thoracic artery procurement and endoscopic procurement of saphenous vein  08/02/2017  Coronary artery bypass grafting x3. LIMA-LAD, SVG-OM, SVG-RCA.  Active  APANETTA1 CABG, with internal thoracic artery procurement and endoscopic procurement of saphenous vein  08/02/2017  Coronary artery bypass grafting x3. LIMA-LAD, SVG-OM, SVG-RCA.  Active  Dinesh Rodriguez

## 2017-08-02 NOTE — BRIEF OPERATIVE NOTE - POST-OP DX
Atrial fibrillation, unspecified type  08/02/2017    Active  Dinesh Rodriguez  Coronary artery disease of native artery of native heart with stable angina pectoris  08/02/2017    Active  Dinesh Rodriguez

## 2017-08-03 DIAGNOSIS — Z87.891 PERSONAL HISTORY OF NICOTINE DEPENDENCE: ICD-10-CM

## 2017-08-03 DIAGNOSIS — E11.9 TYPE 2 DIABETES MELLITUS WITHOUT COMPLICATIONS: ICD-10-CM

## 2017-08-03 DIAGNOSIS — Z95.1 PRESENCE OF AORTOCORONARY BYPASS GRAFT: ICD-10-CM

## 2017-08-03 DIAGNOSIS — I10 ESSENTIAL (PRIMARY) HYPERTENSION: ICD-10-CM

## 2017-08-03 DIAGNOSIS — I25.10 ATHEROSCLEROTIC HEART DISEASE OF NATIVE CORONARY ARTERY WITHOUT ANGINA PECTORIS: ICD-10-CM

## 2017-08-03 DIAGNOSIS — Z95.0 PRESENCE OF CARDIAC PACEMAKER: ICD-10-CM

## 2017-08-03 DIAGNOSIS — Z29.9 ENCOUNTER FOR PROPHYLACTIC MEASURES, UNSPECIFIED: ICD-10-CM

## 2017-08-03 DIAGNOSIS — I48.2 CHRONIC ATRIAL FIBRILLATION: ICD-10-CM

## 2017-08-03 DIAGNOSIS — R57.0 CARDIOGENIC SHOCK: ICD-10-CM

## 2017-08-03 DIAGNOSIS — Z95.5 PRESENCE OF CORONARY ANGIOPLASTY IMPLANT AND GRAFT: ICD-10-CM

## 2017-08-03 LAB
ALBUMIN SERPL ELPH-MCNC: 4.1 G/DL — SIGNIFICANT CHANGE UP (ref 3.3–5.2)
ALP SERPL-CCNC: 54 U/L — SIGNIFICANT CHANGE UP (ref 40–120)
ALT FLD-CCNC: 8 U/L — SIGNIFICANT CHANGE UP
ANION GAP SERPL CALC-SCNC: 16 MMOL/L — SIGNIFICANT CHANGE UP (ref 5–17)
ANION GAP SERPL CALC-SCNC: 19 MMOL/L — HIGH (ref 5–17)
APTT BLD: 25.9 SEC — LOW (ref 27.5–37.4)
AST SERPL-CCNC: 22 U/L — SIGNIFICANT CHANGE UP
BASE EXCESS BLDV CALC-SCNC: -3.7 MMOL/L — LOW (ref -3–3)
BILIRUB SERPL-MCNC: 1.7 MG/DL — SIGNIFICANT CHANGE UP (ref 0.4–2)
BLOOD GAS COMMENTS, VENOUS: SIGNIFICANT CHANGE UP
BUN SERPL-MCNC: 23 MG/DL — HIGH (ref 8–20)
BUN SERPL-MCNC: 29 MG/DL — HIGH (ref 8–20)
CALCIUM SERPL-MCNC: 7.6 MG/DL — LOW (ref 8.6–10.2)
CALCIUM SERPL-MCNC: 7.7 MG/DL — LOW (ref 8.6–10.2)
CHLORIDE SERPL-SCNC: 102 MMOL/L — SIGNIFICANT CHANGE UP (ref 98–107)
CHLORIDE SERPL-SCNC: 106 MMOL/L — SIGNIFICANT CHANGE UP (ref 98–107)
CK SERPL-CCNC: 144 U/L — SIGNIFICANT CHANGE UP (ref 30–200)
CO2 SERPL-SCNC: 17 MMOL/L — LOW (ref 22–29)
CO2 SERPL-SCNC: 19 MMOL/L — LOW (ref 22–29)
CREAT SERPL-MCNC: 0.89 MG/DL — SIGNIFICANT CHANGE UP (ref 0.5–1.3)
CREAT SERPL-MCNC: 1.13 MG/DL — SIGNIFICANT CHANGE UP (ref 0.5–1.3)
GAS PNL BLDA: SIGNIFICANT CHANGE UP
GAS PNL BLDV: SIGNIFICANT CHANGE UP
GLUCOSE SERPL-MCNC: 102 MG/DL — SIGNIFICANT CHANGE UP (ref 70–115)
GLUCOSE SERPL-MCNC: 103 MG/DL — SIGNIFICANT CHANGE UP (ref 70–115)
HCO3 BLDV-SCNC: 21 MMOL/L — SIGNIFICANT CHANGE UP (ref 20–26)
HCT VFR BLD CALC: 27.1 % — LOW (ref 42–52)
HCT VFR BLD CALC: 27.5 % — LOW (ref 42–52)
HGB BLD-MCNC: 9 G/DL — LOW (ref 14–18)
HGB BLD-MCNC: 9.5 G/DL — LOW (ref 14–18)
HOROWITZ INDEX BLDV+IHG-RTO: SIGNIFICANT CHANGE UP
INR BLD: 1.35 RATIO — HIGH (ref 0.88–1.16)
MAGNESIUM SERPL-MCNC: 2 MG/DL — SIGNIFICANT CHANGE UP (ref 1.6–2.6)
MAGNESIUM SERPL-MCNC: 2.1 MG/DL — SIGNIFICANT CHANGE UP (ref 1.6–2.6)
MCHC RBC-ENTMCNC: 30.2 PG — SIGNIFICANT CHANGE UP (ref 27–31)
MCHC RBC-ENTMCNC: 31.3 PG — HIGH (ref 27–31)
MCHC RBC-ENTMCNC: 33.2 G/DL — SIGNIFICANT CHANGE UP (ref 32–36)
MCHC RBC-ENTMCNC: 34.5 G/DL — SIGNIFICANT CHANGE UP (ref 32–36)
MCV RBC AUTO: 90.5 FL — SIGNIFICANT CHANGE UP (ref 80–94)
MCV RBC AUTO: 90.9 FL — SIGNIFICANT CHANGE UP (ref 80–94)
PCO2 BLDV: 52 MMHG — HIGH (ref 35–50)
PH BLDV: 7.26 — LOW (ref 7.35–7.45)
PLATELET # BLD AUTO: 103 K/UL — LOW (ref 150–400)
PLATELET # BLD AUTO: 116 K/UL — LOW (ref 150–400)
PO2 BLDV: 34 MMHG — SIGNIFICANT CHANGE UP (ref 25–45)
POTASSIUM SERPL-MCNC: 4.7 MMOL/L — SIGNIFICANT CHANGE UP (ref 3.5–5.3)
POTASSIUM SERPL-MCNC: 5 MMOL/L — SIGNIFICANT CHANGE UP (ref 3.5–5.3)
POTASSIUM SERPL-SCNC: 4.7 MMOL/L — SIGNIFICANT CHANGE UP (ref 3.5–5.3)
POTASSIUM SERPL-SCNC: 5 MMOL/L — SIGNIFICANT CHANGE UP (ref 3.5–5.3)
PROT SERPL-MCNC: 5.5 G/DL — LOW (ref 6.6–8.7)
PROTHROM AB SERPL-ACNC: 14.9 SEC — HIGH (ref 9.8–12.7)
RBC # BLD: 2.98 M/UL — LOW (ref 4.6–6.2)
RBC # BLD: 3.04 M/UL — LOW (ref 4.6–6.2)
RBC # FLD: 14.1 % — SIGNIFICANT CHANGE UP (ref 11–15.6)
RBC # FLD: 14.6 % — SIGNIFICANT CHANGE UP (ref 11–15.6)
SAO2 % BLDV: 56 % — LOW (ref 67–88)
SODIUM SERPL-SCNC: 138 MMOL/L — SIGNIFICANT CHANGE UP (ref 135–145)
SODIUM SERPL-SCNC: 141 MMOL/L — SIGNIFICANT CHANGE UP (ref 135–145)
TROPONIN T SERPL-MCNC: 0.18 NG/ML — HIGH (ref 0–0.06)
WBC # BLD: 13 K/UL — HIGH (ref 4.8–10.8)
WBC # BLD: 7.7 K/UL — SIGNIFICANT CHANGE UP (ref 4.8–10.8)
WBC # FLD AUTO: 13 K/UL — HIGH (ref 4.8–10.8)
WBC # FLD AUTO: 7.7 K/UL — SIGNIFICANT CHANGE UP (ref 4.8–10.8)

## 2017-08-03 PROCEDURE — 93010 ELECTROCARDIOGRAM REPORT: CPT

## 2017-08-03 PROCEDURE — 71010: CPT | Mod: 26

## 2017-08-03 PROCEDURE — 71010: CPT | Mod: 26,77

## 2017-08-03 RX ORDER — ACETAMINOPHEN 500 MG
1000 TABLET ORAL ONCE
Qty: 0 | Refills: 0 | Status: COMPLETED | OUTPATIENT
Start: 2017-08-03 | End: 2017-08-03

## 2017-08-03 RX ORDER — DOBUTAMINE HCL 250MG/20ML
2.5 VIAL (ML) INTRAVENOUS
Qty: 500 | Refills: 0 | Status: DISCONTINUED | OUTPATIENT
Start: 2017-08-03 | End: 2017-08-04

## 2017-08-03 RX ORDER — DIGOXIN 250 MCG
0.12 TABLET ORAL DAILY
Qty: 0 | Refills: 0 | Status: DISCONTINUED | OUTPATIENT
Start: 2017-08-03 | End: 2017-08-17

## 2017-08-03 RX ORDER — DIGOXIN 250 MCG
0.12 TABLET ORAL DAILY
Qty: 0 | Refills: 0 | Status: DISCONTINUED | OUTPATIENT
Start: 2017-08-03 | End: 2017-08-03

## 2017-08-03 RX ORDER — AMIODARONE HYDROCHLORIDE 400 MG/1
150 TABLET ORAL ONCE
Qty: 0 | Refills: 0 | Status: COMPLETED | OUTPATIENT
Start: 2017-08-03 | End: 2017-08-03

## 2017-08-03 RX ORDER — ENOXAPARIN SODIUM 100 MG/ML
40 INJECTION SUBCUTANEOUS DAILY
Qty: 0 | Refills: 0 | Status: DISCONTINUED | OUTPATIENT
Start: 2017-08-03 | End: 2017-08-13

## 2017-08-03 RX ORDER — ONDANSETRON 8 MG/1
4 TABLET, FILM COATED ORAL ONCE
Qty: 0 | Refills: 0 | Status: COMPLETED | OUTPATIENT
Start: 2017-08-03 | End: 2017-08-03

## 2017-08-03 RX ORDER — OXYCODONE HYDROCHLORIDE 5 MG/1
10 TABLET ORAL EVERY 4 HOURS
Qty: 0 | Refills: 0 | Status: DISCONTINUED | OUTPATIENT
Start: 2017-08-03 | End: 2017-08-08

## 2017-08-03 RX ORDER — IPRATROPIUM/ALBUTEROL SULFATE 18-103MCG
3 AEROSOL WITH ADAPTER (GRAM) INHALATION EVERY 6 HOURS
Qty: 0 | Refills: 0 | Status: COMPLETED | OUTPATIENT
Start: 2017-08-03 | End: 2017-08-04

## 2017-08-03 RX ORDER — SODIUM CHLORIDE 9 MG/ML
250 INJECTION, SOLUTION INTRAVENOUS ONCE
Qty: 0 | Refills: 0 | Status: COMPLETED | OUTPATIENT
Start: 2017-08-03 | End: 2017-08-03

## 2017-08-03 RX ORDER — AMIODARONE HYDROCHLORIDE 400 MG/1
400 TABLET ORAL EVERY 8 HOURS
Qty: 0 | Refills: 0 | Status: DISCONTINUED | OUTPATIENT
Start: 2017-08-03 | End: 2017-08-04

## 2017-08-03 RX ORDER — OXYCODONE HYDROCHLORIDE 5 MG/1
5 TABLET ORAL EVERY 4 HOURS
Qty: 0 | Refills: 0 | Status: DISCONTINUED | OUTPATIENT
Start: 2017-08-03 | End: 2017-08-08

## 2017-08-03 RX ORDER — POTASSIUM CHLORIDE 20 MEQ
10 PACKET (EA) ORAL ONCE
Qty: 0 | Refills: 0 | Status: COMPLETED | OUTPATIENT
Start: 2017-08-03 | End: 2017-08-03

## 2017-08-03 RX ORDER — FUROSEMIDE 40 MG
40 TABLET ORAL ONCE
Qty: 0 | Refills: 0 | Status: COMPLETED | OUTPATIENT
Start: 2017-08-03 | End: 2017-08-03

## 2017-08-03 RX ORDER — DIGOXIN 250 MCG
0.25 TABLET ORAL ONCE
Qty: 0 | Refills: 0 | Status: COMPLETED | OUTPATIENT
Start: 2017-08-03 | End: 2017-08-03

## 2017-08-03 RX ORDER — MAGNESIUM SULFATE 500 MG/ML
2 VIAL (ML) INJECTION ONCE
Qty: 0 | Refills: 0 | Status: COMPLETED | OUTPATIENT
Start: 2017-08-03 | End: 2017-08-03

## 2017-08-03 RX ORDER — FUROSEMIDE 40 MG
20 TABLET ORAL ONCE
Qty: 0 | Refills: 0 | Status: COMPLETED | OUTPATIENT
Start: 2017-08-03 | End: 2017-08-03

## 2017-08-03 RX ORDER — INSULIN LISPRO 100/ML
2 VIAL (ML) SUBCUTANEOUS
Qty: 0 | Refills: 0 | Status: DISCONTINUED | OUTPATIENT
Start: 2017-08-03 | End: 2017-08-04

## 2017-08-03 RX ADMIN — Medication 3 MILLILITER(S): at 20:16

## 2017-08-03 RX ADMIN — Medication 2 UNIT(S): at 17:41

## 2017-08-03 RX ADMIN — Medication 400 MILLIGRAM(S): at 06:38

## 2017-08-03 RX ADMIN — Medication 3 MILLILITER(S): at 08:51

## 2017-08-03 RX ADMIN — Medication 20 MILLIGRAM(S): at 05:55

## 2017-08-03 RX ADMIN — Medication 40 MILLIGRAM(S): at 21:21

## 2017-08-03 RX ADMIN — Medication 100 MILLIEQUIVALENT(S): at 00:53

## 2017-08-03 RX ADMIN — AMIODARONE HYDROCHLORIDE 400 MILLIGRAM(S): 400 TABLET ORAL at 21:22

## 2017-08-03 RX ADMIN — Medication 2 UNIT(S): at 08:33

## 2017-08-03 RX ADMIN — CLOPIDOGREL BISULFATE 75 MILLIGRAM(S): 75 TABLET, FILM COATED ORAL at 12:44

## 2017-08-03 RX ADMIN — Medication 3 MILLILITER(S): at 15:13

## 2017-08-03 RX ADMIN — Medication 100 MILLIGRAM(S): at 21:21

## 2017-08-03 RX ADMIN — CLOPIDOGREL BISULFATE 75 MILLIGRAM(S): 75 TABLET, FILM COATED ORAL at 01:30

## 2017-08-03 RX ADMIN — Medication 0.25 MILLIGRAM(S): at 15:00

## 2017-08-03 RX ADMIN — OXYCODONE HYDROCHLORIDE 5 MILLIGRAM(S): 5 TABLET ORAL at 15:37

## 2017-08-03 RX ADMIN — Medication 250 MILLIGRAM(S): at 13:27

## 2017-08-03 RX ADMIN — AMIODARONE HYDROCHLORIDE 618 MILLIGRAM(S): 400 TABLET ORAL at 18:38

## 2017-08-03 RX ADMIN — Medication 250 MILLIGRAM(S): at 23:24

## 2017-08-03 RX ADMIN — Medication 0.12 MILLIGRAM(S): at 12:44

## 2017-08-03 RX ADMIN — INSULIN HUMAN 1 UNIT(S)/HR: 100 INJECTION, SOLUTION SUBCUTANEOUS at 17:41

## 2017-08-03 RX ADMIN — Medication 100 MILLIGRAM(S): at 08:33

## 2017-08-03 RX ADMIN — Medication 2 UNIT(S): at 12:44

## 2017-08-03 RX ADMIN — Medication 1000 MILLIGRAM(S): at 06:53

## 2017-08-03 RX ADMIN — Medication 100 MILLIGRAM(S): at 04:15

## 2017-08-03 RX ADMIN — Medication 325 MILLIGRAM(S): at 01:30

## 2017-08-03 RX ADMIN — Medication 3 MILLILITER(S): at 03:38

## 2017-08-03 RX ADMIN — OXYCODONE HYDROCHLORIDE 5 MILLIGRAM(S): 5 TABLET ORAL at 08:33

## 2017-08-03 RX ADMIN — Medication 100 MILLIEQUIVALENT(S): at 04:16

## 2017-08-03 RX ADMIN — PANTOPRAZOLE SODIUM 40 MILLIGRAM(S): 20 TABLET, DELAYED RELEASE ORAL at 12:44

## 2017-08-03 RX ADMIN — AMIODARONE HYDROCHLORIDE 618 MILLIGRAM(S): 400 TABLET ORAL at 20:49

## 2017-08-03 RX ADMIN — Medication 400 MILLIGRAM(S): at 18:37

## 2017-08-03 RX ADMIN — OXYCODONE HYDROCHLORIDE 5 MILLIGRAM(S): 5 TABLET ORAL at 09:15

## 2017-08-03 RX ADMIN — Medication 100 MILLIGRAM(S): at 14:52

## 2017-08-03 RX ADMIN — MUPIROCIN 1 APPLICATION(S): 20 OINTMENT TOPICAL at 18:37

## 2017-08-03 RX ADMIN — Medication 1000 MILLIGRAM(S): at 18:37

## 2017-08-03 RX ADMIN — ENOXAPARIN SODIUM 40 MILLIGRAM(S): 100 INJECTION SUBCUTANEOUS at 12:44

## 2017-08-03 RX ADMIN — OXYCODONE HYDROCHLORIDE 5 MILLIGRAM(S): 5 TABLET ORAL at 21:22

## 2017-08-03 RX ADMIN — Medication 100 MILLIGRAM(S): at 20:50

## 2017-08-03 RX ADMIN — SODIUM CHLORIDE 1000 MILLILITER(S): 9 INJECTION, SOLUTION INTRAVENOUS at 13:42

## 2017-08-03 RX ADMIN — MUPIROCIN 1 APPLICATION(S): 20 OINTMENT TOPICAL at 06:53

## 2017-08-03 RX ADMIN — ONDANSETRON 4 MILLIGRAM(S): 8 TABLET, FILM COATED ORAL at 01:39

## 2017-08-03 RX ADMIN — OXYCODONE HYDROCHLORIDE 5 MILLIGRAM(S): 5 TABLET ORAL at 14:52

## 2017-08-03 RX ADMIN — Medication 50 GRAM(S): at 17:41

## 2017-08-03 RX ADMIN — Medication 325 MILLIGRAM(S): at 12:44

## 2017-08-03 RX ADMIN — OXYCODONE HYDROCHLORIDE 5 MILLIGRAM(S): 5 TABLET ORAL at 22:20

## 2017-08-03 RX ADMIN — Medication 100 MILLIGRAM(S): at 13:26

## 2017-08-03 NOTE — PHYSICAL THERAPY INITIAL EVALUATION ADULT - CRITERIA FOR SKILLED THERAPEUTIC INTERVENTIONS
rehab potential/impairments found/anticipated discharge recommendation/functional limitations in following categories

## 2017-08-03 NOTE — PROGRESS NOTE ADULT - PROBLEM SELECTOR PLAN 7
Patient has own PPM by [employer]  Evaluated post-op and settings established - DDD Rate 61, mA 10

## 2017-08-03 NOTE — PHYSICAL THERAPY INITIAL EVALUATION ADULT - ADDITIONAL COMMENTS
Pt. lives in a single story condo.  Pt. states he uses a standard cane for outdoor ambulation and stairs without a handrail.

## 2017-08-03 NOTE — PROGRESS NOTE ADULT - PROBLEM SELECTOR PLAN 10
Mechanical DVT ppx with VTE ppx boots  Start Lovenox for DVT ppx if platelet count remains stable  Continue GI ppx with Protonix and Colace Mechanical DVT ppx with VTE ppx boots  Start Lovenox for DVT ppx if platelet count remains stable  Continue GI ppx with Protonix and Colace          Plan to be discussed / reviewed with attending surgeons during AM rounds.

## 2017-08-03 NOTE — PROGRESS NOTE ADULT - PROBLEM SELECTOR PLAN 2
Titrate pressors as tolerated  Start beta blocker as tolerated by HR and SBP once off pressors/inotropes  Start lipitor for chronic graft patency prophylaxis  Encourage PO intake  Encourage OOB to chair and ambulation with PT  Encourage deep breathing exercised and coughing  Chest PT with nursing staff  Continue ASA

## 2017-08-03 NOTE — CHART NOTE - NSCHARTNOTEFT_GEN_A_CORE
POD #1 s/p CABG  Patient without complaints.  No apparent anesthetic complications.  All questions answered.  Continue management as per CTICU.

## 2017-08-03 NOTE — PROGRESS NOTE ADULT - SUBJECTIVE AND OBJECTIVE BOX
Brief summary:    85 year old Male POD# 1 from CABG x 3 with FEDERCIO clip clip.    Overnight events:  Patient with minimal pain concerns. Titrating vasopressors. Extubated with post-extubation gas showing respiratory acidosis requiring BIPAP.     Past Medical History:  Atherosclerosis of native coronary artery without angina pectoris  Family history of heart disease (Sibling, Father)  Handoff  Stroke syndrome  CAD (coronary artery disease)  LEON (dyspnea on exertion)  Myocardial ischemia  Fatigue  Hypertension  Stented coronary artery  Arthritis  Trigger finger  Pacemaker  HTN (hypertension)  Diabetes mellitus  Afib  Atrial fibrillation, unspecified type  Coronary artery disease of native artery of native heart with stable angina pectoris  Atrial fibrillation, unspecified type  Coronary artery disease of native artery of native heart with stable angina pectoris  CABG, with internal thoracic artery procurement and endoscopic procurement of saphenous vein  Status post trigger finger release  S/P angioplasty with stent  Pacemaker  ATHSCL HEART DISEASE OF NATIVE        sodium chloride 0.9%. 1000 milliLiter(s) IV Continuous <Continuous>  sodium chloride 0.9%. 1000 milliLiter(s) IV Continuous <Continuous>  meperidine     Injectable 25 milliGRAM(s) IV Push once PRN  pantoprazole  Injectable 40 milliGRAM(s) IV Push daily  docusate sodium 100 milliGRAM(s) Oral three times a day  potassium chloride  10 mEq/50 mL IVPB 10 milliEquivalent(s) IV Intermittent every 1 hour  potassium chloride  10 mEq/50 mL IVPB 10 milliEquivalent(s) IV Intermittent once  norepinephrine Infusion 0.008 MICROgram(s)/kG/Min IV Continuous <Continuous>  vasopressin Infusion 0.05 Unit(s)/Min IV Continuous <Continuous>  cefuroxime  IVPB 1500 milliGRAM(s) IV Intermittent every 8 hours  vancomycin  IVPB 1000 milliGRAM(s) IV Intermittent every 12 hours  dextrose 50% Injectable 50 milliLiter(s) IV Push every 15 minutes  dextrose 50% Injectable 25 milliLiter(s) IV Push every 15 minutes  insulin Infusion 1 Unit(s)/Hr IV Continuous <Continuous>  DOBUTamine Infusion 5 MICROgram(s)/kG/Min IV Continuous <Continuous>  mupirocin 2% Ointment 1 Application(s) Topical two times a day  aspirin enteric coated 325 milliGRAM(s) Oral once  aspirin enteric coated 325 milliGRAM(s) Oral daily  clopidogrel Tablet 75 milliGRAM(s) Oral once  clopidogrel Tablet 75 milliGRAM(s) Oral daily  potassium chloride  10 mEq/50 mL IVPB 10 milliEquivalent(s) IV Intermittent every 1 hour  ALBUTerol/ipratropium for Nebulization 3 milliLiter(s) Nebulizer every 6 hours  MEDICATIONS  (PRN):  meperidine     Injectable 25 milliGRAM(s) IV Push once PRN For Shivering    Height (cm): 160.02 (08-02 @ 08:02)  Weight (kg): 68 (08-02 @ 08:02)  BMI (kg/m2): 26.6 (08-02 @ 08:02)  BSA (m2): 1.71 (08-02 @ 08:02)Mode: CPAP with PS, FiO2: 40, PEEP: 5, PS: 5, MAP: 7  Daily Height in cm: 160.02 (02 Aug 2017 08:02)    Daily       ABG - ( 02 Aug 2017 23:45 )  pH: 7.29  /  pCO2: 46    /  pO2: 76    / HCO3: 21    / Base Excess: -4.4  /  SaO2: 96                                      10.8   15.3  )-----------( 127      ( 02 Aug 2017 17:35 )             32.5   08-02    139  |  103  |  24.0<H>  ----------------------------<  203<H>  4.4   |  19.0<L>  |  0.69    Ca    7.9<L>      02 Aug 2017 17:35  Mg     2.6     08-02    TPro  5.1<L>  /  Alb  3.3  /  TBili  1.7  /  DBili  x   /  AST  19  /  ALT  9   /  AlkPhos  65  08-02    CARDIAC MARKERS ( 02 Aug 2017 17:35 )  x     / 0.24 ng/mL / 128 U/L / x     / x        PT/INR - ( 02 Aug 2017 17:35 )   PT: 14.6 sec;   INR: 1.32 ratio         PTT - ( 02 Aug 2017 17:35 )  PTT:26.2 sec      Objective:  T(C): 37.3 (08-02-17 @ 23:00), Max: 37.3 (08-02-17 @ 23:00)  HR: 69 (08-03-17 @ 00:00) (61 - 80)  BP: 112/59 (08-03-17 @ 00:00) (98/51 - 122/55)  RR: 24 (08-03-17 @ 00:00) (12 - 27)  SpO2: 95% (08-03-17 @ 00:00) (95% - 100%)  Wt(kg): --CAPILLARY BLOOD GLUCOSE  122 (03 Aug 2017 00:00)  130 (02 Aug 2017 23:00)  140 (02 Aug 2017 22:00)  160 (02 Aug 2017 21:00)  180 (02 Aug 2017 20:00)  200 (02 Aug 2017 18:30)  184 (02 Aug 2017 17:20)  135 (02 Aug 2017 08:02)      I&O's Summary    02 Aug 2017 07:01  -  03 Aug 2017 00:51  --------------------------------------------------------  IN: 3110.6 mL / OUT: 1050 mL / NET: 2060.6 mL        Physical Exam  Neuro: A+O x 3, non-focal, speech clear and intact  Pulm: CTA, equal bilaterally  CV: RRR +S1S2, intermittent pacing with own PPM (DDD 61/10)   Abd: soft, NT, ND, +BS  Ext: +DP Pulses b/l, +PT pulses, no edema  Inc: MSI C/D/I/stable w/ dsg, Right C/D/I w/ dsg/Ace wrap    ======    Plan to be discussed / reviewed with attending surgeons during AM rounds. Brief summary:    85 year old Male POD# 1 from CABG x 3 with FEDERICO clip clip.    Overnight events:  Patient with minimal pain concerns. Titrating vasopressors. Extubated with post-extubation gas showing respiratory acidosis requiring BIPAP.     Past Medical History:  Atherosclerosis of native coronary artery without angina pectoris  Family history of heart disease (Sibling, Father)  Handoff  Stroke syndrome  CAD (coronary artery disease)  LEON (dyspnea on exertion)  Myocardial ischemia  Fatigue  Hypertension  Stented coronary artery  Arthritis  Trigger finger  Pacemaker  HTN (hypertension)  Diabetes mellitus  Afib  Atrial fibrillation, unspecified type  Coronary artery disease of native artery of native heart with stable angina pectoris  Atrial fibrillation, unspecified type  Coronary artery disease of native artery of native heart with stable angina pectoris  CABG, with internal thoracic artery procurement and endoscopic procurement of saphenous vein  Status post trigger finger release  S/P angioplasty with stent  Pacemaker  ATHSCL HEART DISEASE OF NATIVE        sodium chloride 0.9%. 1000 milliLiter(s) IV Continuous <Continuous>  sodium chloride 0.9%. 1000 milliLiter(s) IV Continuous <Continuous>  meperidine     Injectable 25 milliGRAM(s) IV Push once PRN  pantoprazole  Injectable 40 milliGRAM(s) IV Push daily  docusate sodium 100 milliGRAM(s) Oral three times a day  potassium chloride  10 mEq/50 mL IVPB 10 milliEquivalent(s) IV Intermittent every 1 hour  potassium chloride  10 mEq/50 mL IVPB 10 milliEquivalent(s) IV Intermittent once  norepinephrine Infusion 0.008 MICROgram(s)/kG/Min IV Continuous <Continuous>  vasopressin Infusion 0.05 Unit(s)/Min IV Continuous <Continuous>  cefuroxime  IVPB 1500 milliGRAM(s) IV Intermittent every 8 hours  vancomycin  IVPB 1000 milliGRAM(s) IV Intermittent every 12 hours  dextrose 50% Injectable 50 milliLiter(s) IV Push every 15 minutes  dextrose 50% Injectable 25 milliLiter(s) IV Push every 15 minutes  insulin Infusion 1 Unit(s)/Hr IV Continuous <Continuous>  DOBUTamine Infusion 5 MICROgram(s)/kG/Min IV Continuous <Continuous>  mupirocin 2% Ointment 1 Application(s) Topical two times a day  aspirin enteric coated 325 milliGRAM(s) Oral once  aspirin enteric coated 325 milliGRAM(s) Oral daily  clopidogrel Tablet 75 milliGRAM(s) Oral once  clopidogrel Tablet 75 milliGRAM(s) Oral daily  potassium chloride  10 mEq/50 mL IVPB 10 milliEquivalent(s) IV Intermittent every 1 hour  ALBUTerol/ipratropium for Nebulization 3 milliLiter(s) Nebulizer every 6 hours  MEDICATIONS  (PRN):  meperidine     Injectable 25 milliGRAM(s) IV Push once PRN For Shivering    Height (cm): 160.02 (08-02 @ 08:02)  Weight (kg): 68 (08-02 @ 08:02)  BMI (kg/m2): 26.6 (08-02 @ 08:02)  BSA (m2): 1.71 (08-02 @ 08:02)Mode: CPAP with PS, FiO2: 40, PEEP: 5, PS: 5, MAP: 7  Daily Height in cm: 160.02 (02 Aug 2017 08:02)    Daily       ABG - ( 02 Aug 2017 23:45 )  pH: 7.29  /  pCO2: 46    /  pO2: 76    / HCO3: 21    / Base Excess: -4.4  /  SaO2: 96                                      10.8   15.3  )-----------( 127      ( 02 Aug 2017 17:35 )             32.5   08-02    139  |  103  |  24.0<H>  ----------------------------<  203<H>  4.4   |  19.0<L>  |  0.69    Ca    7.9<L>      02 Aug 2017 17:35  Mg     2.6     08-02    TPro  5.1<L>  /  Alb  3.3  /  TBili  1.7  /  DBili  x   /  AST  19  /  ALT  9   /  AlkPhos  65  08-02    CARDIAC MARKERS ( 02 Aug 2017 17:35 )  x     / 0.24 ng/mL / 128 U/L / x     / x        PT/INR - ( 02 Aug 2017 17:35 )   PT: 14.6 sec;   INR: 1.32 ratio         PTT - ( 02 Aug 2017 17:35 )  PTT:26.2 sec      Objective:  T(C): 37.3 (08-02-17 @ 23:00), Max: 37.3 (08-02-17 @ 23:00)  HR: 69 (08-03-17 @ 00:00) (61 - 80)  BP: 112/59 (08-03-17 @ 00:00) (98/51 - 122/55)  RR: 24 (08-03-17 @ 00:00) (12 - 27)  SpO2: 95% (08-03-17 @ 00:00) (95% - 100%)  Wt(kg): --CAPILLARY BLOOD GLUCOSE  122 (03 Aug 2017 00:00)  130 (02 Aug 2017 23:00)  140 (02 Aug 2017 22:00)  160 (02 Aug 2017 21:00)  180 (02 Aug 2017 20:00)  200 (02 Aug 2017 18:30)  184 (02 Aug 2017 17:20)  135 (02 Aug 2017 08:02)      I&O's Summary    02 Aug 2017 07:01  -  03 Aug 2017 00:51  --------------------------------------------------------  IN: 3110.6 mL / OUT: 1050 mL / NET: 2060.6 mL        Physical Exam  Neuro: A+O x 3, non-focal, speech clear and intact  Pulm: CTA, equal bilaterally  CV: RRR +S1S2, intermittent pacing with own PPM (DDD 61/10)   Abd: soft, NT, ND, +BS  Ext: +DP Pulses b/l, +PT pulses, no edema  Inc: MSI C/D/I/stable w/ dsg, Right C/D/I w/ dsg/Ace wrap    ======

## 2017-08-03 NOTE — PROGRESS NOTE ADULT - ASSESSMENT
Patient Rafaela is an 85 year old male with with history of Afib, CAD with prior cardiac stent (2011), s/p PPM (BS settings DDD 61/10)), HLD, DM, and TIA; originally presented to cardiologist c/o dyspnea. Work up requiring nuclear perfusion study was positive for moderate ischemia of moderated sized territory of the anterior wall.     Patient had CABG x 3 procedure with FEDERICO clip on August 2, 2017.  POD#0: PPM interrogated by [employer] representative and set at DDD 61/10. Hypotensive requiring Levophed and Vasopressor. Dobutamine started in OR for sluggish RV function. Extubated with appropriate gas. Post-extubation gas showing respiratory acidosis requiring nocturnal BIPAP.

## 2017-08-04 DIAGNOSIS — E87.2 ACIDOSIS: ICD-10-CM

## 2017-08-04 LAB
ANION GAP SERPL CALC-SCNC: 13 MMOL/L — SIGNIFICANT CHANGE UP (ref 5–17)
APTT BLD: 27.9 SEC — SIGNIFICANT CHANGE UP (ref 27.5–37.4)
BUN SERPL-MCNC: 27 MG/DL — HIGH (ref 8–20)
CALCIUM SERPL-MCNC: 7.9 MG/DL — LOW (ref 8.6–10.2)
CHLORIDE SERPL-SCNC: 102 MMOL/L — SIGNIFICANT CHANGE UP (ref 98–107)
CO2 SERPL-SCNC: 23 MMOL/L — SIGNIFICANT CHANGE UP (ref 22–29)
CREAT SERPL-MCNC: 1.09 MG/DL — SIGNIFICANT CHANGE UP (ref 0.5–1.3)
GLUCOSE SERPL-MCNC: 131 MG/DL — HIGH (ref 70–115)
HCT VFR BLD CALC: 30 % — LOW (ref 42–52)
HGB BLD-MCNC: 10.3 G/DL — LOW (ref 14–18)
INR BLD: 1.19 RATIO — HIGH (ref 0.88–1.16)
MAGNESIUM SERPL-MCNC: 2.3 MG/DL — SIGNIFICANT CHANGE UP (ref 1.6–2.6)
MCHC RBC-ENTMCNC: 30.7 PG — SIGNIFICANT CHANGE UP (ref 27–31)
MCHC RBC-ENTMCNC: 34.3 G/DL — SIGNIFICANT CHANGE UP (ref 32–36)
MCV RBC AUTO: 89.6 FL — SIGNIFICANT CHANGE UP (ref 80–94)
PLATELET # BLD AUTO: 96 K/UL — LOW (ref 150–400)
POTASSIUM SERPL-MCNC: 4.5 MMOL/L — SIGNIFICANT CHANGE UP (ref 3.5–5.3)
POTASSIUM SERPL-SCNC: 4.5 MMOL/L — SIGNIFICANT CHANGE UP (ref 3.5–5.3)
PROTHROM AB SERPL-ACNC: 13.1 SEC — HIGH (ref 9.8–12.7)
RBC # BLD: 3.35 M/UL — LOW (ref 4.6–6.2)
RBC # FLD: 15.1 % — SIGNIFICANT CHANGE UP (ref 11–15.6)
SODIUM SERPL-SCNC: 138 MMOL/L — SIGNIFICANT CHANGE UP (ref 135–145)
WBC # BLD: 12.1 K/UL — HIGH (ref 4.8–10.8)
WBC # FLD AUTO: 12.1 K/UL — HIGH (ref 4.8–10.8)

## 2017-08-04 PROCEDURE — 93010 ELECTROCARDIOGRAM REPORT: CPT

## 2017-08-04 PROCEDURE — 99222 1ST HOSP IP/OBS MODERATE 55: CPT | Mod: GC

## 2017-08-04 PROCEDURE — 71010: CPT | Mod: 26

## 2017-08-04 RX ORDER — METOPROLOL TARTRATE 50 MG
50 TABLET ORAL EVERY 12 HOURS
Qty: 0 | Refills: 0 | Status: DISCONTINUED | OUTPATIENT
Start: 2017-08-04 | End: 2017-08-06

## 2017-08-04 RX ORDER — WARFARIN SODIUM 2.5 MG/1
5 TABLET ORAL ONCE
Qty: 0 | Refills: 0 | Status: COMPLETED | OUTPATIENT
Start: 2017-08-04 | End: 2017-08-04

## 2017-08-04 RX ORDER — ASPIRIN/CALCIUM CARB/MAGNESIUM 324 MG
81 TABLET ORAL DAILY
Qty: 0 | Refills: 0 | Status: DISCONTINUED | OUTPATIENT
Start: 2017-08-04 | End: 2017-08-17

## 2017-08-04 RX ORDER — INSULIN LISPRO 100/ML
2 VIAL (ML) SUBCUTANEOUS
Qty: 0 | Refills: 0 | Status: DISCONTINUED | OUTPATIENT
Start: 2017-08-04 | End: 2017-08-06

## 2017-08-04 RX ORDER — SODIUM CHLORIDE 9 MG/ML
3 INJECTION INTRAMUSCULAR; INTRAVENOUS; SUBCUTANEOUS EVERY 8 HOURS
Qty: 0 | Refills: 0 | Status: DISCONTINUED | OUTPATIENT
Start: 2017-08-04 | End: 2017-08-17

## 2017-08-04 RX ORDER — METOPROLOL TARTRATE 50 MG
25 TABLET ORAL EVERY 12 HOURS
Qty: 0 | Refills: 0 | Status: DISCONTINUED | OUTPATIENT
Start: 2017-08-04 | End: 2017-08-04

## 2017-08-04 RX ORDER — INSULIN LISPRO 100/ML
VIAL (ML) SUBCUTANEOUS
Qty: 0 | Refills: 0 | Status: DISCONTINUED | OUTPATIENT
Start: 2017-08-04 | End: 2017-08-10

## 2017-08-04 RX ORDER — FUROSEMIDE 40 MG
40 TABLET ORAL ONCE
Qty: 0 | Refills: 0 | Status: COMPLETED | OUTPATIENT
Start: 2017-08-04 | End: 2017-08-04

## 2017-08-04 RX ORDER — PANTOPRAZOLE SODIUM 20 MG/1
40 TABLET, DELAYED RELEASE ORAL
Qty: 0 | Refills: 0 | Status: DISCONTINUED | OUTPATIENT
Start: 2017-08-04 | End: 2017-08-17

## 2017-08-04 RX ORDER — INSULIN GLARGINE 100 [IU]/ML
6 INJECTION, SOLUTION SUBCUTANEOUS AT BEDTIME
Qty: 0 | Refills: 0 | Status: DISCONTINUED | OUTPATIENT
Start: 2017-08-04 | End: 2017-08-05

## 2017-08-04 RX ORDER — INSULIN LISPRO 100/ML
VIAL (ML) SUBCUTANEOUS
Qty: 0 | Refills: 0 | Status: DISCONTINUED | OUTPATIENT
Start: 2017-08-04 | End: 2017-08-04

## 2017-08-04 RX ADMIN — Medication 100 MILLIGRAM(S): at 15:25

## 2017-08-04 RX ADMIN — MUPIROCIN 1 APPLICATION(S): 20 OINTMENT TOPICAL at 18:09

## 2017-08-04 RX ADMIN — Medication 50 MILLIGRAM(S): at 18:04

## 2017-08-04 RX ADMIN — Medication 3 MILLILITER(S): at 15:02

## 2017-08-04 RX ADMIN — Medication 81 MILLIGRAM(S): at 11:40

## 2017-08-04 RX ADMIN — OXYCODONE HYDROCHLORIDE 5 MILLIGRAM(S): 5 TABLET ORAL at 22:15

## 2017-08-04 RX ADMIN — Medication 100 MILLIGRAM(S): at 11:40

## 2017-08-04 RX ADMIN — Medication 100 MILLIGRAM(S): at 04:32

## 2017-08-04 RX ADMIN — Medication 3 MILLILITER(S): at 09:00

## 2017-08-04 RX ADMIN — OXYCODONE HYDROCHLORIDE 5 MILLIGRAM(S): 5 TABLET ORAL at 18:04

## 2017-08-04 RX ADMIN — INSULIN GLARGINE 6 UNIT(S): 100 INJECTION, SOLUTION SUBCUTANEOUS at 21:39

## 2017-08-04 RX ADMIN — INSULIN GLARGINE 6 UNIT(S): 100 INJECTION, SOLUTION SUBCUTANEOUS at 01:20

## 2017-08-04 RX ADMIN — MUPIROCIN 1 APPLICATION(S): 20 OINTMENT TOPICAL at 06:30

## 2017-08-04 RX ADMIN — Medication 100 MILLIGRAM(S): at 06:29

## 2017-08-04 RX ADMIN — PANTOPRAZOLE SODIUM 40 MILLIGRAM(S): 20 TABLET, DELAYED RELEASE ORAL at 07:45

## 2017-08-04 RX ADMIN — SODIUM CHLORIDE 3 MILLILITER(S): 9 INJECTION INTRAMUSCULAR; INTRAVENOUS; SUBCUTANEOUS at 06:11

## 2017-08-04 RX ADMIN — ENOXAPARIN SODIUM 40 MILLIGRAM(S): 100 INJECTION SUBCUTANEOUS at 11:34

## 2017-08-04 RX ADMIN — Medication 3 MILLILITER(S): at 03:41

## 2017-08-04 RX ADMIN — WARFARIN SODIUM 5 MILLIGRAM(S): 2.5 TABLET ORAL at 21:39

## 2017-08-04 RX ADMIN — OXYCODONE HYDROCHLORIDE 5 MILLIGRAM(S): 5 TABLET ORAL at 18:55

## 2017-08-04 RX ADMIN — SODIUM CHLORIDE 3 MILLILITER(S): 9 INJECTION INTRAMUSCULAR; INTRAVENOUS; SUBCUTANEOUS at 13:33

## 2017-08-04 RX ADMIN — Medication 2 UNIT(S): at 16:42

## 2017-08-04 RX ADMIN — Medication 3 MILLILITER(S): at 20:06

## 2017-08-04 RX ADMIN — OXYCODONE HYDROCHLORIDE 10 MILLIGRAM(S): 5 TABLET ORAL at 04:00

## 2017-08-04 RX ADMIN — Medication 25 MILLIGRAM(S): at 07:45

## 2017-08-04 RX ADMIN — OXYCODONE HYDROCHLORIDE 5 MILLIGRAM(S): 5 TABLET ORAL at 09:19

## 2017-08-04 RX ADMIN — Medication 100 MILLIGRAM(S): at 21:39

## 2017-08-04 RX ADMIN — Medication 250 MILLIGRAM(S): at 13:32

## 2017-08-04 RX ADMIN — OXYCODONE HYDROCHLORIDE 5 MILLIGRAM(S): 5 TABLET ORAL at 08:33

## 2017-08-04 RX ADMIN — Medication 40 MILLIGRAM(S): at 06:29

## 2017-08-04 RX ADMIN — SODIUM CHLORIDE 3 MILLILITER(S): 9 INJECTION INTRAMUSCULAR; INTRAVENOUS; SUBCUTANEOUS at 21:33

## 2017-08-04 RX ADMIN — Medication 0.12 MILLIGRAM(S): at 06:29

## 2017-08-04 RX ADMIN — OXYCODONE HYDROCHLORIDE 10 MILLIGRAM(S): 5 TABLET ORAL at 03:02

## 2017-08-04 RX ADMIN — Medication 2: at 11:33

## 2017-08-04 RX ADMIN — Medication 2 UNIT(S): at 07:47

## 2017-08-04 RX ADMIN — AMIODARONE HYDROCHLORIDE 400 MILLIGRAM(S): 400 TABLET ORAL at 06:29

## 2017-08-04 RX ADMIN — OXYCODONE HYDROCHLORIDE 5 MILLIGRAM(S): 5 TABLET ORAL at 23:15

## 2017-08-04 RX ADMIN — Medication 2 UNIT(S): at 11:32

## 2017-08-04 NOTE — PROGRESS NOTE ADULT - PROBLEM SELECTOR PLAN 10
Mechanical DVT ppx with VTE ppx boots  Start Lovenox for DVT ppx if platelet count remains stable  Continue GI ppx with Protonix and Colace          Plan to be discussed / reviewed with attending surgeons during AM rounds.

## 2017-08-04 NOTE — CONSULT NOTE ADULT - ATTENDING COMMENTS
Thank you for the opportunity to participate in the care of this patient.  Please feel free to call with any questions or concerns.
85 year old male with CAD needing CABG. Currently on hiflow oxygen, being monitored in CICU  Patient may need a short course of acute inpatient rehab: PT/OT to improve overall function.  Continue bedside therapy. will f/u on Monday.   d/w CTS

## 2017-08-04 NOTE — CONSULT NOTE ADULT - SUBJECTIVE AND OBJECTIVE BOX
Patient is a 85y old  Male s/p CABG with functional deficits  HPI:    85M with PMH as below admitted to [facility] 8/2 for scheduled CABG x3 with FEDERICO clip with Dr. Watters. No intra-operative complications, post-op cardiogenic shock/hypotension requiring vasopressors, post-extubation respiratory acidosis requiring BIPAP and placed on hiflo for hypoxia, Aflutter improved with Amiodarone      REVIEW OF SYSTEMS  Constitutional - No fever, No weight loss, No fatigue  HEENT - No eye pain, No visual disturbances, No difficulty hearing, No tinnitus, No vertigo, No neck pain  Respiratory - No cough, No wheezing, No shortness of breath  Cardiovascular - No chest pain, No palpitations  Gastrointestinal - No abdominal pain, No nausea, No vomiting, No diarrhea, No constipation  Genitourinary - No dysuria, No frequency, No hematuria, No incontinence  Neurological - No headaches, No memory loss, No loss of strength, No numbness, No tremors  Skin - No itching, No rashes, No lesions   Endocrine - No temperature intolerance  Musculoskeletal - No joint pain, No joint swelling, No muscle pain  Psychiatric - No depression, No anxiety    PAST MEDICAL & SURGICAL HISTORY  Stroke syndrome  CAD (coronary artery disease)  LEON (dyspnea on exertion)  Myocardial ischemia  Fatigue  Hypertension  Stented coronary artery  Arthritis  Trigger finger  Pacemaker  HTN (hypertension)  Diabetes mellitus  Afib  Status post trigger finger release  S/P angioplasty with stent  Pacemaker      SOCIAL HISTORY  Smoking - Denied  EtOH - Denied   Drugs - Denied    FUNCTIONAL HISTORY  Lives with wife in single story condo without HR  Ambulates with SAC for outdoors.    CURRENT FUNCTIONAL STATUS  8/3  Sit-stand: min assist  ambulation- min assist RW 50'      FAMILY HISTORY   Family history of heart disease (Sibling, Father)      RECENT LABS/IMAGING  CBC Full  -  ( 04 Aug 2017 03:25 )  WBC Count : 12.1 K/uL  Hemoglobin : 10.3 g/dL  Hematocrit : 30.0 %  Platelet Count - Automated : 96 K/uL    08-04    138  |  102  |  27.0<H>  ----------------------------<  131<H>  4.5   |  23.0  |  1.09    Ca    7.9<L>      04 Aug 2017 03:25  Mg     2.3     08-04    TPro  5.5<L>  /  Alb  4.1  /  TBili  1.7  /  DBili  x   /  AST  22  /  ALT  8   /  AlkPhos  54  08-03      VITALS  T(C): 36.8 (08-04-17 @ 08:00), Max: 37.6 (08-03-17 @ 18:30)  HR: 103 (08-04-17 @ 12:00) (68 - 141)  BP: 109/64 (08-04-17 @ 12:00) (90/58 - 127/60)  RR: 21 (08-04-17 @ 12:00) (18 - 36)  SpO2: 92% (08-04-17 @ 12:00) (80% - 99%)  Wt(kg): --    ALLERGIES  epinephrine (Other)  No Known Allergies      MEDICATIONS   docusate sodium 100 milliGRAM(s) Oral three times a day  dextrose 50% Injectable 50 milliLiter(s) IV Push every 15 minutes  dextrose 50% Injectable 25 milliLiter(s) IV Push every 15 minutes  mupirocin 2% Ointment 1 Application(s) Topical two times a day  ALBUTerol/ipratropium for Nebulization 3 milliLiter(s) Nebulizer every 6 hours  enoxaparin Injectable 40 milliGRAM(s) SubCutaneous daily  oxyCODONE    IR 5 milliGRAM(s) Oral every 4 hours PRN  oxyCODONE    IR 10 milliGRAM(s) Oral every 4 hours PRN  digoxin     Tablet 0.125 milliGRAM(s) Oral daily  insulin glargine Injectable (LANTUS) 6 Unit(s) SubCutaneous at bedtime  sodium chloride 0.9% lock flush 3 milliLiter(s) IV Push every 8 hours  insulin lispro Injectable (HumaLOG) 2 Unit(s) SubCutaneous three times a day before meals  pantoprazole    Tablet 40 milliGRAM(s) Oral before breakfast  insulin lispro (HumaLOG) corrective regimen sliding scale   SubCutaneous Before meals and at bedtime  metoprolol 25 milliGRAM(s) Oral every 12 hours  warfarin 5 milliGRAM(s) Oral once  aspirin enteric coated 81 milliGRAM(s) Oral daily    ----------------------------------------------------------------------------------------  PHYSICAL EXAM  Constitutional - NAD, Comfortable  HEENT - NCAT, EOMI  Neck - Supple, No limited ROM  Chest - CTA bilaterally, No wheeze, No rhonchi, No crackles  Cardiovascular - RRR, S1S2, No murmurs  Abdomen - BS+, Soft, NTND  Extremities - No C/C/E, No calf tenderness   Neurologic Exam -                    Cognitive - Awake, Alert, AAO to self, place, date, year, situation     Communication - Fluent, No dysarthria     Cranial Nerves - CN 2-12 intact     Motor - No focal deficits                    LEFT    UE - ShAB 5/5, EF 5/5, EE 5/5, WE 5/5,  5/5                    RIGHT UE - ShAB 5/5, EF 5/5, EE 5/5, WE 5/5,  5/5                    LEFT    LE - HF 5/5, KE 5/5, DF 5/5, PF 5/5                    RIGHT LE - HF 5/5, KE 5/5, DF 5/5, PF 5/5        Sensory - Intact to LT     Reflexes - DTR Intact, No primitive reflexive     Coordination - FTN intact     OculoVestibular - No saccades, No nystagmus, VOR         Balance - WNL Static  Psychiatric - Mood stable, Affect WNL  ----------------------------------------------------------------------------------------  ASSESSMENT/PLAN- 85M with CAD s/p CABG x3, post-op cardiogenic shock requiring vasoporessors with functional decline.     CAD s/p CABG - ASA, lopressor  Afib- coumadin resumed, digoxin  Pain - oxycodone  DVT PPX - Lovenox  Rehab - Patient is a 85y old  Male s/p CABG with functional deficits.     HPI:    85M with PMH as below admitted to [facility] 8/2 for scheduled CABG x3 with FEDERICO clip with Dr. Watters. No intra-operative complications, post-op cardiogenic shock/hypotension requiring vasopressors, post-extubation respiratory acidosis requiring BIPAP and placed on hi-flow O2 for hypoxia, Aflutter improved with Amiodarone, continues to be tachycadic to 90-130s and hypoxic to 80s in CICU. Passed TOV.     Patient notes SOB on exertion even with O2 (not on home O2), incisional pain and chest pain with coughing, Constipation.       REVIEW OF SYSTEMS  Constitutional - No fever, No weight loss, No fatigue  HEENT - No eye pain, No visual disturbances, No difficulty hearing, No tinnitus, No vertigo, No neck pain  Respiratory - No cough, No wheezing, shortness of breath on exertion  Cardiovascular - incisional chest pain, No palpitations  Gastrointestinal - No abdominal pain, No nausea, No vomiting, No diarrhea, + constipation  Genitourinary - No dysuria, No frequency, No hematuria, No incontinence  Neurological - No headaches, No memory loss, No loss of strength, No numbness, No tremors  Skin - No itching, No rashes, No lesions   Musculoskeletal -Right rotator cuff tear (old)  Psychiatric - No depression, No anxiety    PAST MEDICAL & SURGICAL HISTORY  Stroke syndrome  CAD (coronary artery disease)  LEON (dyspnea on exertion)  Myocardial ischemia  Fatigue  Hypertension  Stented coronary artery  Arthritis  Trigger finger  Pacemaker  HTN (hypertension)  Diabetes mellitus  Afib  Status post trigger finger release  S/P angioplasty with stent  Pacemaker  Right rotator cuff tear      SOCIAL HISTORY  Smoking - Denied  EtOH - Denied   Drugs - Denied    FUNCTIONAL HISTORY  [facility]  Independent with ambulation without AD, has SAC in case needs to do stairs without railig.    CURRENT FUNCTIONAL STATUS  8/3  Sit-stand: min assist  ambulation- min assist RW 50'    FAMILY HISTORY   Family history of heart disease (Sibling, Father)    RECENT LABS/IMAGING  CBC Full  -  ( 04 Aug 2017 03:25 )  WBC Count : 12.1 K/uL  Hemoglobin : 10.3 g/dL  Hematocrit : 30.0 %  Platelet Count - Automated : 96 K/uL    08-04    138  |  102  |  27.0<H>  ----------------------------<  131<H>  4.5   |  23.0  |  1.09    Ca    7.9<L>      04 Aug 2017 03:25  Mg     2.3     08-04    TPro  5.5<L>  /  Alb  4.1  /  TBili  1.7  /  DBili  x   /  AST  22  /  ALT  8   /  AlkPhos  54  08-03      VITALS  T(C): 36.8 (08-04-17 @ 08:00), Max: 37.6 (08-03-17 @ 18:30)  HR: 103 (08-04-17 @ 12:00) (68 - 141)  BP: 109/64 (08-04-17 @ 12:00) (90/58 - 127/60)  RR: 21 (08-04-17 @ 12:00) (18 - 36)  SpO2: 92% (08-04-17 @ 12:00) (80% - 99%)  Wt(kg): --    ALLERGIES  epinephrine (Other)  No Known Allergies      MEDICATIONS   docusate sodium 100 milliGRAM(s) Oral three times a day  dextrose 50% Injectable 50 milliLiter(s) IV Push every 15 minutes  dextrose 50% Injectable 25 milliLiter(s) IV Push every 15 minutes  mupirocin 2% Ointment 1 Application(s) Topical two times a day  ALBUTerol/ipratropium for Nebulization 3 milliLiter(s) Nebulizer every 6 hours  enoxaparin Injectable 40 milliGRAM(s) SubCutaneous daily  oxyCODONE    IR 5 milliGRAM(s) Oral every 4 hours PRN  oxyCODONE    IR 10 milliGRAM(s) Oral every 4 hours PRN  digoxin     Tablet 0.125 milliGRAM(s) Oral daily  insulin glargine Injectable (LANTUS) 6 Unit(s) SubCutaneous at bedtime  sodium chloride 0.9% lock flush 3 milliLiter(s) IV Push every 8 hours  insulin lispro Injectable (HumaLOG) 2 Unit(s) SubCutaneous three times a day before meals  pantoprazole    Tablet 40 milliGRAM(s) Oral before breakfast  insulin lispro (HumaLOG) corrective regimen sliding scale   SubCutaneous Before meals and at bedtime  metoprolol 25 milliGRAM(s) Oral every 12 hours  warfarin 5 milliGRAM(s) Oral once  aspirin enteric coated 81 milliGRAM(s) Oral daily    ----------------------------------------------------------------------------------------  PHYSICAL EXAM  Constitutional - NAD, Comfortable  HEENT - NCAT, EOMI  Neck - Supple, No limited ROM  Chest -no distress on hi-nader O2, O2 sat down to 80s, + incision covered with dressing with +drainage  Cardiovascular - irregular rhythm, S1S2   Abdomen - oft, NTND  Extremities - No C/C/E, No calf tenderness   Neurologic Exam -                    Cognitive - Awake, Alert, AAO to self, place, date, year, situation     Communication - Fluent, No dysarthria     Cranial Nerves - CN 2-12 intact     Motor - No focal deficits                    LEFT    UE - ShAB 5/5, EF 5/5, EE 5/5, WE 5/5,  5/5                    RIGHT UE - ShAB 3/5 (prior RTC injury), EF 5/5, EE 5/5, WE 5/5,  5/5                    LEFT    LE - HF 5/5, KE 5/5, DF 5/5, PF 5/5                    RIGHT LE - HF 5/5, KE 5/5, DF 5/5, PF 5/5        Sensory - Intact to LT     Coordination - FTN intact   Psychiatric - Mood stable, Affect WNL  ----------------------------------------------------------------------------------------  ASSESSMENT/PLAN- 85M with CAD s/p CABG x3, post-op cardiogenic shock requiring vasopressors, hypoxia requiring hi-nader O2 and afib with HR to 130s.    Hypoxia - O2, duonebs  CAD s/p CABG - ASA, lopressor, cardiac/sternal precautions  Afib- HR not controlled, digoxin, coumadin resumed  Pain - oxycodone PRN  DVT PPX - Lovenox  Rehab - Patient currently in CICU with hypoxia and tachycardia - continue bedside PT as tolerated, will continue to follow for ongoing rehab needs as patient may benefit from inpatient rehab. Patient is a 85y old  Male s/p CABG with functional deficits.     HPI:    85M with PMH as below admitted to [facility] 8/2 for scheduled CABG x3 with FEDERICO clip with Dr. Watters. No intra-operative complications, post-op cardiogenic shock/hypotension requiring vasopressors, post-extubation respiratory acidosis requiring BIPAP and placed on hi-flow O2 for hypoxia, Aflutter improved with Amiodarone, continues to be tachycadic to 90-130s and hypoxic to 80s in CICU. Passed TOV.     Patient notes SOB on exertion even with O2 (not on home O2), incisional pain and chest pain with coughing, Constipation.       REVIEW OF SYSTEMS  Constitutional - No fever, No weight loss, No fatigue  HEENT - No eye pain,  Respiratory - No cough, No wheezing, shortness of breath on exertion  Cardiovascular - incisional chest pain, No palpitations  Gastrointestinal - No abdominal pain,  + constipation  Genitourinary - No dysuria,   Neurological - No headaches,   Skin - No itching, No rashes, No lesions   Musculoskeletal -Right rotator cuff tear (old)  Psychiatric - No depression, No anxiety    PAST MEDICAL & SURGICAL HISTORY  Stroke syndrome  CAD (coronary artery disease)  LEON (dyspnea on exertion)  Myocardial ischemia  Fatigue  Hypertension  Stented coronary artery  Arthritis  Trigger finger  Pacemaker  HTN (hypertension)  Diabetes mellitus  Afib  Status post trigger finger release  S/P angioplasty with stent  Pacemaker  Right rotator cuff tear      SOCIAL HISTORY  Smoking - Denied  EtOH - Denied   Drugs - Denied    FUNCTIONAL HISTORY  Lives with wife in condo, no stairs  Independent with ambulation without AD, has SAC in case needs to do stairs without railig.    CURRENT FUNCTIONAL STATUS  8/3  Sit-stand: min assist  ambulation- min assist RW 50'    FAMILY HISTORY   Family history of heart disease (Sibling, Father)    RECENT LABS/IMAGING  CBC Full  -  ( 04 Aug 2017 03:25 )  WBC Count : 12.1 K/uL  Hemoglobin : 10.3 g/dL  Hematocrit : 30.0 %  Platelet Count - Automated : 96 K/uL    08-04    138  |  102  |  27.0<H>  ----------------------------<  131<H>  4.5   |  23.0  |  1.09    Ca    7.9<L>      04 Aug 2017 03:25  Mg     2.3     08-04    TPro  5.5<L>  /  Alb  4.1  /  TBili  1.7  /  DBili  x   /  AST  22  /  ALT  8   /  AlkPhos  54  08-03      VITALS  T(C): 36.8 (08-04-17 @ 08:00), Max: 37.6 (08-03-17 @ 18:30)  HR: 103 (08-04-17 @ 12:00) (68 - 141)  BP: 109/64 (08-04-17 @ 12:00) (90/58 - 127/60)  RR: 21 (08-04-17 @ 12:00) (18 - 36)  SpO2: 92% (08-04-17 @ 12:00) (80% - 99%)  Wt(kg): --    ALLERGIES  epinephrine (Other)  No Known Allergies      MEDICATIONS   docusate sodium 100 milliGRAM(s) Oral three times a day  dextrose 50% Injectable 50 milliLiter(s) IV Push every 15 minutes  dextrose 50% Injectable 25 milliLiter(s) IV Push every 15 minutes  mupirocin 2% Ointment 1 Application(s) Topical two times a day  ALBUTerol/ipratropium for Nebulization 3 milliLiter(s) Nebulizer every 6 hours  enoxaparin Injectable 40 milliGRAM(s) SubCutaneous daily  oxyCODONE    IR 5 milliGRAM(s) Oral every 4 hours PRN  oxyCODONE    IR 10 milliGRAM(s) Oral every 4 hours PRN  digoxin     Tablet 0.125 milliGRAM(s) Oral daily  insulin glargine Injectable (LANTUS) 6 Unit(s) SubCutaneous at bedtime  sodium chloride 0.9% lock flush 3 milliLiter(s) IV Push every 8 hours  insulin lispro Injectable (HumaLOG) 2 Unit(s) SubCutaneous three times a day before meals  pantoprazole    Tablet 40 milliGRAM(s) Oral before breakfast  insulin lispro (HumaLOG) corrective regimen sliding scale   SubCutaneous Before meals and at bedtime  metoprolol 25 milliGRAM(s) Oral every 12 hours  warfarin 5 milliGRAM(s) Oral once  aspirin enteric coated 81 milliGRAM(s) Oral daily    ----------------------------------------------------------------------------------------  PHYSICAL EXAM  Constitutional - NAD, Comfortable  HEENT - NCAT, EOMI  Neck - Supple, No limited ROM  Chest -no distress on hi-nader O2, O2 sat down to 80s, + incision covered with dressing with +drainage  Cardiovascular - irregular rhythm, S1S2   Abdomen - oft, NTND  Extremities - No C/C/E, No calf tenderness   Neurologic Exam -                    Cognitive - Awake, Alert, AAO to self, place, date, year, situation     Communication - Fluent, No dysarthria     Cranial Nerves - CN 2-12 intact     Motor - No focal deficits                    LEFT    UE - ShAB 5/5, EF 5/5, EE 5/5, WE 5/5,  5/5                    RIGHT UE - ShAB 3/5 (prior RTC injury), EF 5/5, EE 5/5, WE 5/5,  5/5                    LEFT    LE - HF 5/5, KE 5/5, DF 5/5, PF 5/5                    RIGHT LE - HF 5/5, KE 5/5, DF 5/5, PF 5/5        Sensory - Intact to LT     Coordination - FTN intact   Psychiatric - Mood stable, Affect WNL  ----------------------------------------------------------------------------------------  ASSESSMENT/PLAN- 85M with CAD s/p CABG x3, post-op cardiogenic shock requiring vasopressors, hypoxia requiring hi-nader O2 and afib with HR to 130s.    Hypoxia - O2, duonebs  CAD s/p CABG - ASA, lopressor, cardiac/sternal precautions  Afib- HR not controlled, digoxin, coumadin resumed  Pain - oxycodone PRN  DVT PPX - Lovenox  Rehab - Patient currently in CICU with hypoxia and tachycardia - continue bedside PT as tolerated, will continue to follow for ongoing rehab needs as patient may benefit from inpatient rehab.

## 2017-08-04 NOTE — PROGRESS NOTE ADULT - PROBLEM SELECTOR PLAN 2
Start beta blocker as tolerated by HR and SBP   Start lipitor for chronic graft patency prophylaxis  Encourage PO intake  Encourage OOB to chair and ambulation with PT  Encourage deep breathing exercised and coughing  Chest PT with nursing staff  Continue ASA and Plavix

## 2017-08-04 NOTE — CONSULT NOTE ADULT - ASSESSMENT
85 year old male with hx of longstanding type 2 DM, HTN, CAD, Afib admitted for CABG.  His glycemic control seems adequate for his age and comorbidities.  Need to minimize the risk of hypoglycemia in this case.  In the post-surgical time period, agree with use of insulin, however patient can be transitioned back to oral medications when ready for discharge.      PLAN:  1.  T2DM-  for now continue current insulin regimen.  When ready for discharge, can consider DPPIV-I,+/- low dose sulfonylurea or low dose metformin depending on renal function.  2.  HTN-  on metoprolol  3.  CAD-  as per primary team.

## 2017-08-04 NOTE — PROGRESS NOTE ADULT - PROBLEM SELECTOR PLAN 7
Patient has own PPM by [employer]  Evaluated post-op and settings established - DDD Rate 61, mA 10

## 2017-08-04 NOTE — CONSULT NOTE ADULT - SUBJECTIVE AND OBJECTIVE BOX
HPI:  Patient is an 85 year old male with a hx of longstanding Type 2 DM, CAD, A- fib admitted on 8/2/2017 for CABG.  Patient reports that he was experiencing dyspnea and cardiac work up revealed diffuse CAD requiring CABG on 8/2/2017.   He was diagnosed with type 2 DM about 20 years ago and has been managed by his PCP.  His diabetes has responded well to treatment with glipizide 10 mg BID and Metformin 1000 mg BID prescribed by his PCP.  He denies any associated neuropathy or retinopathy.   He does not routinely test BG at home.  After surgery he was treated with insulin gtt and has now been transitioned to Lantus and humalog with good control of BG.      PAST MEDICAL & SURGICAL HISTORY:  CAD s/p PCI 2011  Hypertension  A-fib s/p pacer 2007  Diabetes mellitus    Allergies  No Known Allergies  Intolerances  epinephrine (Other)    MEDICATIONS  (STANDING):  docusate sodium 100 milliGRAM(s) Oral three times a day  dextrose 50% Injectable 50 milliLiter(s) IV Push every 15 minutes  dextrose 50% Injectable 25 milliLiter(s) IV Push every 15 minutes  mupirocin 2% Ointment 1 Application(s) Topical two times a day  ALBUTerol/ipratropium for Nebulization 3 milliLiter(s) Nebulizer every 6 hours  enoxaparin Injectable 40 milliGRAM(s) SubCutaneous daily  digoxin     Tablet 0.125 milliGRAM(s) Oral daily  insulin glargine Injectable (LANTUS) 6 Unit(s) SubCutaneous at bedtime  sodium chloride 0.9% lock flush 3 milliLiter(s) IV Push every 8 hours  insulin lispro Injectable (HumaLOG) 2 Unit(s) SubCutaneous three times a day before meals  pantoprazole    Tablet 40 milliGRAM(s) Oral before breakfast  insulin lispro (HumaLOG) corrective regimen sliding scale   SubCutaneous Before meals and at bedtime  warfarin 5 milliGRAM(s) Oral once  aspirin enteric coated 81 milliGRAM(s) Oral daily  metoprolol 50 milliGRAM(s) Oral every 12 hours    SH:  retired   No Smoking.    FH:  N/C    ROS:  as per HPI, otherwise 10 point ROS negative.      Vital Signs Last 24 Hrs  T(C): 36.8 (04 Aug 2017 08:00), Max: 37.6 (03 Aug 2017 18:30)  T(F): 98.2 (04 Aug 2017 08:00), Max: 99.7 (03 Aug 2017 18:30)  HR: 115 (04 Aug 2017 17:00) (82 - 136)  BP: 110/69 (04 Aug 2017 16:00) (90/58 - 121/58)  BP(mean): 83 (04 Aug 2017 16:00) (65 - 88)  RR: 28 (04 Aug 2017 17:00) (18 - 36)  SpO2: 85% (04 Aug 2017 17:00) (85% - 99%)    PE:  Gen: elderly male, NAD  HEENT:  sclera anicteric, MMM  Neck:  no thyromegaly, trachea is midline  CV:  nl S1 + S2, irregular rhythm, no m/r/g  Resp:  nl respiratory effort, CTA b/l  GI/ Abd: soft, NT/ ND, BS +  Neuro:  No tremor, sensation intact to light touch on b/l feet  MS:  no c/c/e, nl muscle tone  Skin:  no foot ulcers, no rashes    LABS:  08-04    138  |  102  |  27.0<H>  ----------------------------<  131<H>  4.5   |  23.0  |  1.09    Ca    7.9<L>      04 Aug 2017 03:25  Mg     2.3     08-04    TPro  5.5<L>  /  Alb  4.1  /  TBili  1.7  /  DBili  x   /  AST  22  /  ALT  8   /  AlkPhos  54  08-03                          10.3   12.1  )-----------( 96       ( 04 Aug 2017 03:25 )             30.0     CAPILLARY BLOOD GLUCOSE  109 (04 Aug 2017 16:00)  130 (04 Aug 2017 03:00)  129 (04 Aug 2017 02:00)  126 (04 Aug 2017 01:00)  124 (04 Aug 2017 00:00)  116 (03 Aug 2017 23:00)  107 (03 Aug 2017 22:00)  97 (03 Aug 2017 21:00)  120 (03 Aug 2017 20:00)  125 (03 Aug 2017 18:00)    A1c 7.5%

## 2017-08-04 NOTE — PROGRESS NOTE ADULT - SUBJECTIVE AND OBJECTIVE BOX
Brief summary:    85 year old Male POD# 2 from CABG x 3 with FEDERICO clip clip.    Overnight events:  Patient with minimal pain concerns. Titrating vasopressors. Extubated with post-extubation gas showing respiratory acidosis requiring BIPAP.       Past Medical History:  Atherosclerosis of native coronary artery without angina pectoris  Family history of heart disease (Sibling, Father)  Handoff  Stroke syndrome  CAD (coronary artery disease)  LEON (dyspnea on exertion)  Myocardial ischemia  Fatigue  Hypertension  Stented coronary artery  Arthritis  Trigger finger  Pacemaker  HTN (hypertension)  Diabetes mellitus  Afib  Atrial fibrillation, unspecified type  Coronary artery disease of native artery of native heart with stable angina pectoris  Atrial fibrillation, unspecified type  Coronary artery disease of native artery of native heart with stable angina pectoris  Prophylactic measure  Former smoker  Essential hypertension  Pacemaker  Type 2 diabetes mellitus without complication, with long-term current use of insulin  Chronic atrial fibrillation  Stented coronary artery  Cardiogenic shock  S/P CABG x 3  Coronary artery disease involving native coronary artery of native heart without angina pectoris  CABG, with internal thoracic artery procurement and endoscopic procurement of saphenous vein  Status post trigger finger release  S/P angioplasty with stent  Pacemaker  ATHSCL HEART DISEASE OF NATIVE        sodium chloride 0.9%. 1000 milliLiter(s) IV Continuous <Continuous>  sodium chloride 0.9%. 1000 milliLiter(s) IV Continuous <Continuous>  pantoprazole  Injectable 40 milliGRAM(s) IV Push daily  docusate sodium 100 milliGRAM(s) Oral three times a day  cefuroxime  IVPB 1500 milliGRAM(s) IV Intermittent every 8 hours  vancomycin  IVPB 1000 milliGRAM(s) IV Intermittent every 12 hours  dextrose 50% Injectable 50 milliLiter(s) IV Push every 15 minutes  dextrose 50% Injectable 25 milliLiter(s) IV Push every 15 minutes  insulin Infusion 1 Unit(s)/Hr IV Continuous <Continuous>  mupirocin 2% Ointment 1 Application(s) Topical two times a day  aspirin enteric coated 325 milliGRAM(s) Oral daily  clopidogrel Tablet 75 milliGRAM(s) Oral daily  ALBUTerol/ipratropium for Nebulization 3 milliLiter(s) Nebulizer every 6 hours  insulin lispro Injectable (HumaLOG) 2 Unit(s) SubCutaneous three times a day before meals  enoxaparin Injectable 40 milliGRAM(s) SubCutaneous daily  oxyCODONE    IR 5 milliGRAM(s) Oral every 4 hours PRN  oxyCODONE    IR 10 milliGRAM(s) Oral every 4 hours PRN  DOBUTamine Infusion 2.5 MICROgram(s)/kG/Min IV Continuous <Continuous>  amiodarone    Tablet 400 milliGRAM(s) Oral every 8 hours  digoxin     Tablet 0.125 milliGRAM(s) Oral daily  MEDICATIONS  (PRN):  oxyCODONE    IR 5 milliGRAM(s) Oral every 4 hours PRN Moderate Pain (4 - 6)  oxyCODONE    IR 10 milliGRAM(s) Oral every 4 hours PRN Breakthru / Severe Pain (7 - 10)      Daily     Daily Weight in k (03 Aug 2017 10:11)      ABG - ( 03 Aug 2017 17:29 )  pH: 7.36  /  pCO2: 36    /  pO2: 64    / HCO3: 21    / Base Excess: -4.4  /  SaO2: 92                              9.0    13.0  )-----------( 116      ( 03 Aug 2017 15:43 )             27.1   08-03    138  |  102  |  29.0<H>  ----------------------------<  102  5.0   |  17.0<L>  |  1.13    Ca    7.6<L>      03 Aug 2017 15:43  Mg     2.0     08-    TPro  5.5<L>  /  Alb  4.1  /  TBili  1.7  /  DBili  x   /  AST  22  /  ALT  8   /  AlkPhos  54  08-03    CARDIAC MARKERS ( 03 Aug 2017 03:45 )  x     / 0.18 ng/mL / 144 U/L / x     / x      CARDIAC MARKERS ( 02 Aug 2017 17:35 )  x     / 0.24 ng/mL / 128 U/L / x     / x        PT/INR - ( 03 Aug 2017 03:45 )   PT: 14.9 sec;   INR: 1.35 ratio         PTT - ( 03 Aug 2017 03:45 )  PTT:25.9 sec      Objective:  T(C): 37.2 (17 @ 00:00), Max: 37.6 (17 @ 18:30)  HR: 86 (17 @ 00:00) (65 - 141)  BP: 113/64 (17 @ 23:15) (88/54 - 132/62)  RR: 20 (17 @ 00:00) (18 - 36)  SpO2: 95% (17 @ 00:00) (80% - 99%)  Wt(kg): --CAPILLARY BLOOD GLUCOSE  124 (04 Aug 2017 00:00)  116 (03 Aug 2017 23:00)  107 (03 Aug 2017 22:00)  97 (03 Aug 2017 21:00)  120 (03 Aug 2017 20:00)  125 (03 Aug 2017 18:00)  105 (03 Aug 2017 16:00)  126 (03 Aug 2017 14:00)  136 (03 Aug 2017 12:00)  146 (03 Aug 2017 10:00)  134 (03 Aug 2017 09:00)  136 (03 Aug 2017 08:00)  137 (03 Aug 2017 07:00)  125 (03 Aug 2017 06:00)  111 (03 Aug 2017 05:00)  106 (03 Aug 2017 04:00)  102 (03 Aug 2017 03:00)  100 (03 Aug 2017 02:00)  110 (03 Aug 2017 01:00)      I&O's Summary    02 Aug 2017 07:01  -  03 Aug 2017 07:00  --------------------------------------------------------  IN: 3382.8 mL / OUT: 1390 mL / NET: 1992.8 mL    03 Aug 2017 07:01  -  04 Aug 2017 00:35  --------------------------------------------------------  IN: 2490.4 mL / OUT: 2755 mL / NET: -264.6 mL        Physical Exam  Neuro: A+O x 3, non-focal, speech clear and intact  Pulm: CTA, equal bilaterally  CV: irregularly irregular, +S1S2  Abd: soft, NT, ND, +BS  Ext: +DP Pulses b/l, +PT pulses, no edema  Inc: MSI C/D/I/stable w/ dsg, Right C/D/I w/ dsg/Ace wrap

## 2017-08-04 NOTE — PROGRESS NOTE ADULT - ASSESSMENT
Patient Rafaela is an 85 year old male with with history of Afib, CAD with prior cardiac stent (2011), s/p PPM (BS settings DDD 61/10)), HLD, DM, and TIA; originally presented to cardiologist c/o dyspnea. Work up requiring nuclear perfusion study was positive for moderate ischemia of moderated sized territory of the anterior wall.     Patient had CABG x 3 procedure with FEDERICO clip on August 2, 2017.  POD#0: PPM interrogated by [employer] representative and set at DDD 61/10. Hypotensive requiring Levophed and Vasopressor. Dobutamine started in OR for sluggish RV function. Extubated with appropriate gas. Post-extubation gas showing respiratory acidosis requiring nocturnal BIPAP.   POD#1: Dobutamine weaned, Amiodarone IV bolus x 2 for aflutter in the 150s, load started. Dobutamine turned off secondary to continued rapid Aflutter. Placed on hiflo for hypoxia.

## 2017-08-05 ENCOUNTER — TRANSCRIPTION ENCOUNTER (OUTPATIENT)
Age: 82
End: 2017-08-05

## 2017-08-05 LAB
ANION GAP SERPL CALC-SCNC: 13 MMOL/L — SIGNIFICANT CHANGE UP (ref 5–17)
BUN SERPL-MCNC: 35 MG/DL — HIGH (ref 8–20)
CALCIUM SERPL-MCNC: 8.2 MG/DL — LOW (ref 8.6–10.2)
CHLORIDE SERPL-SCNC: 100 MMOL/L — SIGNIFICANT CHANGE UP (ref 98–107)
CO2 SERPL-SCNC: 26 MMOL/L — SIGNIFICANT CHANGE UP (ref 22–29)
CREAT SERPL-MCNC: 1.14 MG/DL — SIGNIFICANT CHANGE UP (ref 0.5–1.3)
GLUCOSE SERPL-MCNC: 133 MG/DL — HIGH (ref 70–115)
HCT VFR BLD CALC: 32.9 % — LOW (ref 42–52)
HGB BLD-MCNC: 11 G/DL — LOW (ref 14–18)
INR BLD: 1.13 RATIO — SIGNIFICANT CHANGE UP (ref 0.88–1.16)
MAGNESIUM SERPL-MCNC: 2 MG/DL — SIGNIFICANT CHANGE UP (ref 1.6–2.6)
MCHC RBC-ENTMCNC: 30.4 PG — SIGNIFICANT CHANGE UP (ref 27–31)
MCHC RBC-ENTMCNC: 33.4 G/DL — SIGNIFICANT CHANGE UP (ref 32–36)
MCV RBC AUTO: 90.9 FL — SIGNIFICANT CHANGE UP (ref 80–94)
PLATELET # BLD AUTO: 101 K/UL — LOW (ref 150–400)
POTASSIUM SERPL-MCNC: 4.8 MMOL/L — SIGNIFICANT CHANGE UP (ref 3.5–5.3)
POTASSIUM SERPL-SCNC: 4.8 MMOL/L — SIGNIFICANT CHANGE UP (ref 3.5–5.3)
PROTHROM AB SERPL-ACNC: 12.5 SEC — SIGNIFICANT CHANGE UP (ref 9.8–12.7)
RBC # BLD: 3.62 M/UL — LOW (ref 4.6–6.2)
RBC # FLD: 15 % — SIGNIFICANT CHANGE UP (ref 11–15.6)
SODIUM SERPL-SCNC: 139 MMOL/L — SIGNIFICANT CHANGE UP (ref 135–145)
WBC # BLD: 12.2 K/UL — HIGH (ref 4.8–10.8)
WBC # FLD AUTO: 12.2 K/UL — HIGH (ref 4.8–10.8)

## 2017-08-05 PROCEDURE — 71010: CPT | Mod: 26

## 2017-08-05 RX ORDER — WARFARIN SODIUM 2.5 MG/1
5 TABLET ORAL ONCE
Qty: 0 | Refills: 0 | Status: COMPLETED | OUTPATIENT
Start: 2017-08-05 | End: 2017-08-05

## 2017-08-05 RX ORDER — ATORVASTATIN CALCIUM 80 MG/1
40 TABLET, FILM COATED ORAL AT BEDTIME
Qty: 0 | Refills: 0 | Status: DISCONTINUED | OUTPATIENT
Start: 2017-08-05 | End: 2017-08-17

## 2017-08-05 RX ORDER — FUROSEMIDE 40 MG
40 TABLET ORAL DAILY
Qty: 0 | Refills: 0 | Status: DISCONTINUED | OUTPATIENT
Start: 2017-08-05 | End: 2017-08-06

## 2017-08-05 RX ADMIN — MUPIROCIN 1 APPLICATION(S): 20 OINTMENT TOPICAL at 21:56

## 2017-08-05 RX ADMIN — Medication 100 MILLIGRAM(S): at 12:27

## 2017-08-05 RX ADMIN — WARFARIN SODIUM 5 MILLIGRAM(S): 2.5 TABLET ORAL at 21:55

## 2017-08-05 RX ADMIN — MUPIROCIN 1 APPLICATION(S): 20 OINTMENT TOPICAL at 06:25

## 2017-08-05 RX ADMIN — Medication 50 MILLIGRAM(S): at 06:25

## 2017-08-05 RX ADMIN — SODIUM CHLORIDE 3 MILLILITER(S): 9 INJECTION INTRAMUSCULAR; INTRAVENOUS; SUBCUTANEOUS at 20:36

## 2017-08-05 RX ADMIN — Medication 2: at 12:27

## 2017-08-05 RX ADMIN — OXYCODONE HYDROCHLORIDE 5 MILLIGRAM(S): 5 TABLET ORAL at 09:12

## 2017-08-05 RX ADMIN — OXYCODONE HYDROCHLORIDE 5 MILLIGRAM(S): 5 TABLET ORAL at 08:31

## 2017-08-05 RX ADMIN — Medication 0.12 MILLIGRAM(S): at 06:25

## 2017-08-05 RX ADMIN — SODIUM CHLORIDE 3 MILLILITER(S): 9 INJECTION INTRAMUSCULAR; INTRAVENOUS; SUBCUTANEOUS at 12:32

## 2017-08-05 RX ADMIN — ENOXAPARIN SODIUM 40 MILLIGRAM(S): 100 INJECTION SUBCUTANEOUS at 21:56

## 2017-08-05 RX ADMIN — Medication 81 MILLIGRAM(S): at 12:27

## 2017-08-05 RX ADMIN — Medication 2 UNIT(S): at 12:27

## 2017-08-05 RX ADMIN — PANTOPRAZOLE SODIUM 40 MILLIGRAM(S): 20 TABLET, DELAYED RELEASE ORAL at 06:25

## 2017-08-05 RX ADMIN — Medication 40 MILLIGRAM(S): at 12:27

## 2017-08-05 RX ADMIN — Medication 100 MILLIGRAM(S): at 21:56

## 2017-08-05 RX ADMIN — Medication 50 MILLIGRAM(S): at 17:44

## 2017-08-05 RX ADMIN — Medication 100 MILLIGRAM(S): at 06:25

## 2017-08-05 RX ADMIN — ATORVASTATIN CALCIUM 40 MILLIGRAM(S): 80 TABLET, FILM COATED ORAL at 21:55

## 2017-08-05 RX ADMIN — Medication 2 UNIT(S): at 07:55

## 2017-08-05 RX ADMIN — SODIUM CHLORIDE 3 MILLILITER(S): 9 INJECTION INTRAMUSCULAR; INTRAVENOUS; SUBCUTANEOUS at 06:17

## 2017-08-05 NOTE — PROGRESS NOTE ADULT - ASSESSMENT
85 year old male with PMH Afib, CAD with prior cardiac stent (2011), s/p PPM (BS settings DDD 61/10)), HLD, DM, and TIA; originally presented to cardiologist c/o dyspnea. Work up requiring nuclear perfusion study was positive for moderate ischemia of moderated sized territory of the anterior wall.   8/2 s/p CABG x 3 and left atrial appendage clip. Post-op course complicated by hypoxemic respiratory failure requiring HFNC O2. Dobutamine intially used post-op for mild RV failure and pressors for post-op vasoplegia all since d/c'd.

## 2017-08-05 NOTE — PROGRESS NOTE ADULT - SUBJECTIVE AND OBJECTIVE BOX
Subjective: "feel pretty good except some SOB". Denies CP, palpitations, dizziness, N/V, other c/o.    T(C): 36.9 (08-04-17 @ 20:00), Max: 37.5 (08-04-17 @ 05:00)  HR: 106 (08-04-17 @ 22:00) (93 - 127), a-fib  BP: 119/68 (08-04-17 @ 22:00) (93/51 - 124/71)  ABP: 115/57 (08-04-17 @ 05:00) (112/57 - 149/77)  ABP(mean): 76 (08-04-17 @ 05:00) (75 - 102)  RR: 22 (08-04-17 @ 22:00) (18 - 32)  SpO2: 94% (08-04-17 @ 22:00) (85% - 99%) on 45% HFNC  CVP(mm Hg): 12 (08-04-17 @ 05:00) (10 - 17)         I&O's Detail    03 Aug 2017 07:01  -  04 Aug 2017 07:00  --------------------------------------------------------  IN:    DOBUTamine Infusion: 30.5 mL    DOBUTamine Infusion: 57.9 mL    insulin Infusion: 14 mL    Lactated Ringers IV Bolus: 250 mL    Oral Fluid: 600 mL    Packed Red Blood Cells: 346 mL    sodium chloride 0.9%: 105 mL    sodium chloride 0.9%: 210 mL    Solution: 200 mL    Solution: 100 mL    Solution: 500 mL    Solution: 100 mL    vasopressin Infusion: 37 mL  Total IN: 2550.4 mL    OUT:    Chest Tube: 40 mL    Chest Tube: 195 mL    Indwelling Catheter - Urethral: 4210 mL  Total OUT: 4445 mL    Total NET: -1894.6 mL      04 Aug 2017 07:01  -  05 Aug 2017 01:12  --------------------------------------------------------  IN:    Oral Fluid: 600 mL  Total IN: 600 mL    OUT:    left pleural Chest Tube: 30 mL (since d/c'd)    Voided: 575 mL  Total OUT: 605 mL    Total NET: -5 mL            08-04    138  |  102  |  27.0<H>  ----------------------------<  131<H>  4.5   |  23.0  |  1.09    Ca    7.9<L>      04 Aug 2017 03:25  Mg     2.3     08-04                        10.3   12.1  )-----------( 96       ( 04 Aug 2017 03:25 )             30.0     PT/INR - ( 04 Aug 2017 03:25 )   PT: 13.1 sec;   INR: 1.19 ratio    PTT - ( 04 Aug 2017 03:25 )  PTT:27.9 sec    CAPILLARY BLOOD GLUCOSE  128 (04 Aug 2017 22:00)  109 (04 Aug 2017 16:00)  130 (04 Aug 2017 03:00)  129 (04 Aug 2017 02:00)      < from: Xray Chest 1 View AP/PA. (08.04.17 @ 13:19) >  Findings:  The left chest tube has been removed since the prior examination. The left internal jugular central line has been removed. No evidence of pneumothorax. Persistent atelectasis at the left lung base with probable small left pleural effusion.  Impression: Removal of left chest tube and left IJ central line since the prior study, otherwise stable.  < end of copied text >    8/5/17 CXR: pending      MEDICATIONS  (STANDING):  docusate sodium 100 milliGRAM(s) Oral three times a day  mupirocin 2% Ointment 1 Application(s) Topical two times a day  enoxaparin Injectable 40 milliGRAM(s) SubCutaneous daily  digoxin     Tablet 0.125 milliGRAM(s) Oral daily  insulin glargine Injectable (LANTUS) 6 Unit(s) SubCutaneous at bedtime  sodium chloride 0.9% lock flush 3 milliLiter(s) IV Push every 8 hours  insulin lispro Injectable (HumaLOG) 2 Unit(s) SubCutaneous three times a day before meals  pantoprazole    Tablet 40 milliGRAM(s) Oral before breakfast  insulin lispro (HumaLOG) corrective regimen sliding scale   SubCutaneous Before meals and at bedtime  aspirin enteric coated 81 milliGRAM(s) Oral daily  metoprolol 50 milliGRAM(s) Oral every 12 hours    MEDICATIONS  (PRN):  oxyCODONE    IR 5 milliGRAM(s) Oral every 4 hours PRN Moderate Pain (4 - 6)  oxyCODONE    IR 10 milliGRAM(s) Oral every 4 hours PRN Breakthru / Severe Pain (7 - 10)      Physical Exam  Neuro: A+O x 3, non-focal, speech clear and intact  Pulm: CTA with mildly diminished left base   CV: irregularly irregular, +S1S2  Abd: soft, NT, ND, +BS  Ext: RITTER x 4, trace edema  Inc: MSI C/D/I/stable w/ dsg, RLE C/D/I w/ dsg/Ace wrap

## 2017-08-05 NOTE — PROGRESS NOTE ADULT - PROBLEM SELECTOR PLAN 2
continue beta-blockers  continue statin  continue aspirin  plavix d/c'd as discussed with Dr Watters (coumadin started)

## 2017-08-05 NOTE — PROGRESS NOTE ADULT - PROBLEM SELECTOR PLAN 7
lovenox and SCDs for DVT PPX  Continue GI ppx with Protonix           Plan to be discussed / reviewed with attending surgeons during AM rounds.

## 2017-08-06 LAB
ANION GAP SERPL CALC-SCNC: 14 MMOL/L — SIGNIFICANT CHANGE UP (ref 5–17)
BUN SERPL-MCNC: 36 MG/DL — HIGH (ref 8–20)
CALCIUM SERPL-MCNC: 8.2 MG/DL — LOW (ref 8.6–10.2)
CHLORIDE SERPL-SCNC: 97 MMOL/L — LOW (ref 98–107)
CO2 SERPL-SCNC: 30 MMOL/L — HIGH (ref 22–29)
CREAT SERPL-MCNC: 0.89 MG/DL — SIGNIFICANT CHANGE UP (ref 0.5–1.3)
GLUCOSE SERPL-MCNC: 99 MG/DL — SIGNIFICANT CHANGE UP (ref 70–115)
HCT VFR BLD CALC: 34.5 % — LOW (ref 42–52)
HGB BLD-MCNC: 11.5 G/DL — LOW (ref 14–18)
INR BLD: 1.13 RATIO — SIGNIFICANT CHANGE UP (ref 0.88–1.16)
MAGNESIUM SERPL-MCNC: 1.8 MG/DL — SIGNIFICANT CHANGE UP (ref 1.6–2.6)
MCHC RBC-ENTMCNC: 30.5 PG — SIGNIFICANT CHANGE UP (ref 27–31)
MCHC RBC-ENTMCNC: 33.3 G/DL — SIGNIFICANT CHANGE UP (ref 32–36)
MCV RBC AUTO: 91.5 FL — SIGNIFICANT CHANGE UP (ref 80–94)
PLATELET # BLD AUTO: 141 K/UL — LOW (ref 150–400)
POTASSIUM SERPL-MCNC: 4.5 MMOL/L — SIGNIFICANT CHANGE UP (ref 3.5–5.3)
POTASSIUM SERPL-SCNC: 4.5 MMOL/L — SIGNIFICANT CHANGE UP (ref 3.5–5.3)
PROTHROM AB SERPL-ACNC: 12.5 SEC — SIGNIFICANT CHANGE UP (ref 9.8–12.7)
RBC # BLD: 3.77 M/UL — LOW (ref 4.6–6.2)
RBC # FLD: 14.6 % — SIGNIFICANT CHANGE UP (ref 11–15.6)
SODIUM SERPL-SCNC: 141 MMOL/L — SIGNIFICANT CHANGE UP (ref 135–145)
WBC # BLD: 10 K/UL — SIGNIFICANT CHANGE UP (ref 4.8–10.8)
WBC # FLD AUTO: 10 K/UL — SIGNIFICANT CHANGE UP (ref 4.8–10.8)

## 2017-08-06 PROCEDURE — 71010: CPT | Mod: 26

## 2017-08-06 RX ORDER — MAGNESIUM SULFATE 500 MG/ML
2 VIAL (ML) INJECTION ONCE
Qty: 0 | Refills: 0 | Status: COMPLETED | OUTPATIENT
Start: 2017-08-06 | End: 2017-08-06

## 2017-08-06 RX ORDER — WARFARIN SODIUM 2.5 MG/1
5 TABLET ORAL ONCE
Qty: 0 | Refills: 0 | Status: COMPLETED | OUTPATIENT
Start: 2017-08-06 | End: 2017-08-06

## 2017-08-06 RX ORDER — METOPROLOL TARTRATE 50 MG
75 TABLET ORAL
Qty: 0 | Refills: 0 | Status: DISCONTINUED | OUTPATIENT
Start: 2017-08-06 | End: 2017-08-07

## 2017-08-06 RX ORDER — METOPROLOL TARTRATE 50 MG
25 TABLET ORAL ONCE
Qty: 0 | Refills: 0 | Status: COMPLETED | OUTPATIENT
Start: 2017-08-06 | End: 2017-08-06

## 2017-08-06 RX ORDER — METFORMIN HYDROCHLORIDE 850 MG/1
1000 TABLET ORAL
Qty: 0 | Refills: 0 | Status: DISCONTINUED | OUTPATIENT
Start: 2017-08-06 | End: 2017-08-10

## 2017-08-06 RX ADMIN — Medication 81 MILLIGRAM(S): at 12:12

## 2017-08-06 RX ADMIN — Medication 100 MILLIGRAM(S): at 12:12

## 2017-08-06 RX ADMIN — WARFARIN SODIUM 5 MILLIGRAM(S): 2.5 TABLET ORAL at 22:38

## 2017-08-06 RX ADMIN — METFORMIN HYDROCHLORIDE 1000 MILLIGRAM(S): 850 TABLET ORAL at 17:40

## 2017-08-06 RX ADMIN — SODIUM CHLORIDE 3 MILLILITER(S): 9 INJECTION INTRAMUSCULAR; INTRAVENOUS; SUBCUTANEOUS at 05:28

## 2017-08-06 RX ADMIN — MUPIROCIN 1 APPLICATION(S): 20 OINTMENT TOPICAL at 05:23

## 2017-08-06 RX ADMIN — ATORVASTATIN CALCIUM 40 MILLIGRAM(S): 80 TABLET, FILM COATED ORAL at 22:38

## 2017-08-06 RX ADMIN — SODIUM CHLORIDE 3 MILLILITER(S): 9 INJECTION INTRAMUSCULAR; INTRAVENOUS; SUBCUTANEOUS at 12:03

## 2017-08-06 RX ADMIN — ENOXAPARIN SODIUM 40 MILLIGRAM(S): 100 INJECTION SUBCUTANEOUS at 22:38

## 2017-08-06 RX ADMIN — Medication 40 MILLIGRAM(S): at 05:24

## 2017-08-06 RX ADMIN — Medication 75 MILLIGRAM(S): at 17:40

## 2017-08-06 RX ADMIN — Medication 2: at 22:39

## 2017-08-06 RX ADMIN — Medication 50 GRAM(S): at 08:43

## 2017-08-06 RX ADMIN — SODIUM CHLORIDE 3 MILLILITER(S): 9 INJECTION INTRAMUSCULAR; INTRAVENOUS; SUBCUTANEOUS at 22:48

## 2017-08-06 RX ADMIN — Medication 100 MILLIGRAM(S): at 22:38

## 2017-08-06 RX ADMIN — Medication 2: at 12:12

## 2017-08-06 RX ADMIN — Medication 25 MILLIGRAM(S): at 14:47

## 2017-08-06 RX ADMIN — PANTOPRAZOLE SODIUM 40 MILLIGRAM(S): 20 TABLET, DELAYED RELEASE ORAL at 05:23

## 2017-08-06 RX ADMIN — Medication 0.12 MILLIGRAM(S): at 05:23

## 2017-08-06 RX ADMIN — Medication 50 MILLIGRAM(S): at 05:23

## 2017-08-06 RX ADMIN — Medication 100 MILLIGRAM(S): at 05:23

## 2017-08-06 RX ADMIN — MUPIROCIN 1 APPLICATION(S): 20 OINTMENT TOPICAL at 22:47

## 2017-08-06 RX ADMIN — METFORMIN HYDROCHLORIDE 1000 MILLIGRAM(S): 850 TABLET ORAL at 08:43

## 2017-08-06 NOTE — PROGRESS NOTE ADULT - SUBJECTIVE AND OBJECTIVE BOX
Subjective:  "Im ok I guess, I feel short of breath sometime when I move around"  Ambulated w/ PT REGINALD^140s      Tele:  Afib 120s           V/S:                    T(F): 98 (08-06-17 @ 08:00), Max: 98.5 (08-05-17 @ 21:35)  HR: 97 (08-06-17 @ 12:15) (86 - 100)  BP: 102/64 (08-06-17 @ 12:15) (102/64 - 128/68)  RR: 20 (08-06-17 @ 12:15) (16 - 20)  SpO2: 97% (08-06-17 @ 12:15) (93% - 100%)      LV EF:  nml      Labs:  08-06    141  |  97<L>  |  36.0<H>  ----------------------------<  99  4.5   |  30.0<H>  |  0.89    Ca    8.2<L>      06 Aug 2017 05:35  Mg     1.8     08-06                                 11.5   10.0  )-----------( 141      ( 06 Aug 2017 05:35 )             34.5        PT/INR - ( 06 Aug 2017 05:35 )   PT: 12.5 sec;   INR: 1.13 ratio              CAPILLARY BLOOD GLUCOSE  184 (06 Aug 2017 12:15)  109 (06 Aug 2017 10:00)  106 (05 Aug 2017 20:35)  76 (05 Aug 2017 17:46)               CXR:  There is a right-sided pacemaker. Status post sternotomy.   There is a device overlying the left heart, unchanged. Small left pleural   effusion is slightly improved.      I&O's Detail    05 Aug 2017 07:01  -  06 Aug 2017 07:00  --------------------------------------------------------  IN:    Oral Fluid: 870 mL  Total IN: 870 mL    OUT:    Voided: 1525 mL  Total OUT: 1525 mL    Total NET: -655 mL      06 Aug 2017 07:01  -  06 Aug 2017 14:44  --------------------------------------------------------  IN:    Oral Fluid: 240 mL    Solution: 50 mL  Total IN: 290 mL    OUT:    Voided: 850 mL  Total OUT: 850 mL    Total NET: -560 mL          CHEST TUBE:  [ ] YES [x ] NO  OUTPUT:     per 24 hours    AIR LEAKS:  [ ] YES [ ] NO      NORM DRAIN:   [ ] YES [ x] NO  OUTPUT:     per 24 hours    EPICARDIAL WIRES:  [ ] YES [x ] NO      BOWEL MOVEMENT:  [x ] YES [ ] NO        Medications:  docusate sodium 100 milliGRAM(s) Oral three times a day  dextrose 50% Injectable 50 milliLiter(s) IV Push every 15 minutes  dextrose 50% Injectable 25 milliLiter(s) IV Push every 15 minutes  mupirocin 2% Ointment 1 Application(s) Topical two times a day  enoxaparin Injectable 40 milliGRAM(s) SubCutaneous daily  oxyCODONE    IR 5 milliGRAM(s) Oral every 4 hours PRN  oxyCODONE    IR 10 milliGRAM(s) Oral every 4 hours PRN  digoxin     Tablet 0.125 milliGRAM(s) Oral daily  sodium chloride 0.9% lock flush 3 milliLiter(s) IV Push every 8 hours  pantoprazole    Tablet 40 milliGRAM(s) Oral before breakfast  insulin lispro (HumaLOG) corrective regimen sliding scale   SubCutaneous Before meals and at bedtime  aspirin enteric coated 81 milliGRAM(s) Oral daily  metoprolol 50 milliGRAM(s) Oral every 12 hours  atorvastatin 40 milliGRAM(s) Oral at bedtime  metFORMIN 1000 milliGRAM(s) Oral two times a day with meals  warfarin 5 milliGRAM(s) Oral once  metoprolol 25 milliGRAM(s) Oral once  metoprolol 75 milliGRAM(s) Oral two times a day        Physical Exam:    Neuro:  alert, pleasant no deficits    Pulm: supplemental O2 required  Symmetrical expansion  demonstrates proper use incentive spirometer     CV:  S1  S2  irreg  irreg  tachycardic    Abd: soft  non tender  +  BS    Extremities:  1+ edema B/L  LE   mepilex RLE  EVH site    Incision(s): sternum stable ,  mepilex in place                 PAST MEDICAL & SURGICAL HISTORY:  Stroke syndrome  CAD (coronary artery disease)  LEON (dyspnea on exertion)  Myocardial ischemia  Fatigue  Hypertension  Stented coronary artery: ELLIE x 1  (2011) -- [facility]  Arthritis  Trigger finger  Pacemaker  HTN (hypertension)  Diabetes mellitus  Afib  Status post trigger finger release: Multiple in the past  S/P angioplasty with stent  Pacemaker: 2007

## 2017-08-06 NOTE — PROGRESS NOTE ADULT - ASSESSMENT
85 year old male with PMH Afib, CAD with prior cardiac stent (2011), s/p PPM (BS settings DDD 61/10)), HLD, DM, and TIA; originally presented to cardiologist c/o dyspnea.   Work up requiring nuclear perfusion study was positive for moderate ischemia of moderated sized territory of the anterior wall.   8/2 s/p CABG x 3 and left atrial appendage clip. Post-op course complicated by hypoxemic respiratory failure requiring HFNC O2.   Dobutamine intially used post-op for mild RV failure and pressors for post-op vasoplegia all sinceresolved

## 2017-08-07 DIAGNOSIS — R09.02 HYPOXEMIA: ICD-10-CM

## 2017-08-07 LAB
ALBUMIN SERPL ELPH-MCNC: 3.6 G/DL — SIGNIFICANT CHANGE UP (ref 3.3–5.2)
ALP SERPL-CCNC: 64 U/L — SIGNIFICANT CHANGE UP (ref 40–120)
ALT FLD-CCNC: 12 U/L — SIGNIFICANT CHANGE UP
ANION GAP SERPL CALC-SCNC: 14 MMOL/L — SIGNIFICANT CHANGE UP (ref 5–17)
AST SERPL-CCNC: 19 U/L — SIGNIFICANT CHANGE UP
BILIRUB SERPL-MCNC: 1.5 MG/DL — SIGNIFICANT CHANGE UP (ref 0.4–2)
BUN SERPL-MCNC: 37 MG/DL — HIGH (ref 8–20)
CALCIUM SERPL-MCNC: 8.2 MG/DL — LOW (ref 8.6–10.2)
CHLORIDE SERPL-SCNC: 95 MMOL/L — LOW (ref 98–107)
CO2 SERPL-SCNC: 31 MMOL/L — HIGH (ref 22–29)
CREAT SERPL-MCNC: 0.91 MG/DL — SIGNIFICANT CHANGE UP (ref 0.5–1.3)
GLUCOSE SERPL-MCNC: 76 MG/DL — SIGNIFICANT CHANGE UP (ref 70–115)
HCT VFR BLD CALC: 35.8 % — LOW (ref 42–52)
HGB BLD-MCNC: 11.9 G/DL — LOW (ref 14–18)
INR BLD: 1.24 RATIO — HIGH (ref 0.88–1.16)
MAGNESIUM SERPL-MCNC: 1.9 MG/DL — SIGNIFICANT CHANGE UP (ref 1.6–2.6)
MCHC RBC-ENTMCNC: 30.6 PG — SIGNIFICANT CHANGE UP (ref 27–31)
MCHC RBC-ENTMCNC: 33.2 G/DL — SIGNIFICANT CHANGE UP (ref 32–36)
MCV RBC AUTO: 92 FL — SIGNIFICANT CHANGE UP (ref 80–94)
PLATELET # BLD AUTO: 178 K/UL — SIGNIFICANT CHANGE UP (ref 150–400)
POTASSIUM SERPL-MCNC: 3.9 MMOL/L — SIGNIFICANT CHANGE UP (ref 3.5–5.3)
POTASSIUM SERPL-SCNC: 3.9 MMOL/L — SIGNIFICANT CHANGE UP (ref 3.5–5.3)
PROT SERPL-MCNC: 5.8 G/DL — LOW (ref 6.6–8.7)
PROTHROM AB SERPL-ACNC: 13.7 SEC — HIGH (ref 9.8–12.7)
RBC # BLD: 3.89 M/UL — LOW (ref 4.6–6.2)
RBC # FLD: 14.4 % — SIGNIFICANT CHANGE UP (ref 11–15.6)
SODIUM SERPL-SCNC: 140 MMOL/L — SIGNIFICANT CHANGE UP (ref 135–145)
WBC # BLD: 9.8 K/UL — SIGNIFICANT CHANGE UP (ref 4.8–10.8)
WBC # FLD AUTO: 9.8 K/UL — SIGNIFICANT CHANGE UP (ref 4.8–10.8)

## 2017-08-07 PROCEDURE — 71010: CPT | Mod: 26

## 2017-08-07 PROCEDURE — 99232 SBSQ HOSP IP/OBS MODERATE 35: CPT

## 2017-08-07 PROCEDURE — 93010 ELECTROCARDIOGRAM REPORT: CPT

## 2017-08-07 RX ORDER — IPRATROPIUM/ALBUTEROL SULFATE 18-103MCG
3 AEROSOL WITH ADAPTER (GRAM) INHALATION EVERY 6 HOURS
Qty: 0 | Refills: 0 | Status: COMPLETED | OUTPATIENT
Start: 2017-08-07 | End: 2017-08-08

## 2017-08-07 RX ORDER — WARFARIN SODIUM 2.5 MG/1
5 TABLET ORAL ONCE
Qty: 0 | Refills: 0 | Status: COMPLETED | OUTPATIENT
Start: 2017-08-07 | End: 2017-08-07

## 2017-08-07 RX ORDER — METOPROLOL TARTRATE 50 MG
25 TABLET ORAL ONCE
Qty: 0 | Refills: 0 | Status: COMPLETED | OUTPATIENT
Start: 2017-08-07 | End: 2017-08-07

## 2017-08-07 RX ORDER — METOPROLOL TARTRATE 50 MG
100 TABLET ORAL
Qty: 0 | Refills: 0 | Status: DISCONTINUED | OUTPATIENT
Start: 2017-08-07 | End: 2017-08-17

## 2017-08-07 RX ORDER — POTASSIUM CHLORIDE 20 MEQ
40 PACKET (EA) ORAL ONCE
Qty: 0 | Refills: 0 | Status: COMPLETED | OUTPATIENT
Start: 2017-08-07 | End: 2017-08-07

## 2017-08-07 RX ORDER — FUROSEMIDE 40 MG
40 TABLET ORAL ONCE
Qty: 0 | Refills: 0 | Status: COMPLETED | OUTPATIENT
Start: 2017-08-07 | End: 2017-08-07

## 2017-08-07 RX ORDER — MAGNESIUM OXIDE 400 MG ORAL TABLET 241.3 MG
400 TABLET ORAL ONCE
Qty: 0 | Refills: 0 | Status: COMPLETED | OUTPATIENT
Start: 2017-08-07 | End: 2017-08-07

## 2017-08-07 RX ADMIN — PANTOPRAZOLE SODIUM 40 MILLIGRAM(S): 20 TABLET, DELAYED RELEASE ORAL at 06:00

## 2017-08-07 RX ADMIN — Medication 100 MILLIGRAM(S): at 06:00

## 2017-08-07 RX ADMIN — Medication 81 MILLIGRAM(S): at 11:39

## 2017-08-07 RX ADMIN — ATORVASTATIN CALCIUM 40 MILLIGRAM(S): 80 TABLET, FILM COATED ORAL at 21:46

## 2017-08-07 RX ADMIN — Medication 3 MILLILITER(S): at 20:30

## 2017-08-07 RX ADMIN — Medication 100 MILLIGRAM(S): at 21:46

## 2017-08-07 RX ADMIN — SODIUM CHLORIDE 3 MILLILITER(S): 9 INJECTION INTRAMUSCULAR; INTRAVENOUS; SUBCUTANEOUS at 13:34

## 2017-08-07 RX ADMIN — METFORMIN HYDROCHLORIDE 1000 MILLIGRAM(S): 850 TABLET ORAL at 08:20

## 2017-08-07 RX ADMIN — Medication 3 MILLILITER(S): at 17:37

## 2017-08-07 RX ADMIN — Medication 75 MILLIGRAM(S): at 06:00

## 2017-08-07 RX ADMIN — Medication 40 MILLIEQUIVALENT(S): at 08:19

## 2017-08-07 RX ADMIN — MAGNESIUM OXIDE 400 MG ORAL TABLET 400 MILLIGRAM(S): 241.3 TABLET ORAL at 08:20

## 2017-08-07 RX ADMIN — MUPIROCIN 1 APPLICATION(S): 20 OINTMENT TOPICAL at 06:01

## 2017-08-07 RX ADMIN — SODIUM CHLORIDE 3 MILLILITER(S): 9 INJECTION INTRAMUSCULAR; INTRAVENOUS; SUBCUTANEOUS at 21:45

## 2017-08-07 RX ADMIN — SODIUM CHLORIDE 3 MILLILITER(S): 9 INJECTION INTRAMUSCULAR; INTRAVENOUS; SUBCUTANEOUS at 05:19

## 2017-08-07 RX ADMIN — Medication 100 MILLIGRAM(S): at 19:26

## 2017-08-07 RX ADMIN — METFORMIN HYDROCHLORIDE 1000 MILLIGRAM(S): 850 TABLET ORAL at 17:14

## 2017-08-07 RX ADMIN — Medication 25 MILLIGRAM(S): at 11:39

## 2017-08-07 RX ADMIN — ENOXAPARIN SODIUM 40 MILLIGRAM(S): 100 INJECTION SUBCUTANEOUS at 21:46

## 2017-08-07 RX ADMIN — Medication 0.12 MILLIGRAM(S): at 06:00

## 2017-08-07 RX ADMIN — WARFARIN SODIUM 5 MILLIGRAM(S): 2.5 TABLET ORAL at 08:20

## 2017-08-07 RX ADMIN — Medication 4: at 13:33

## 2017-08-07 NOTE — PROGRESS NOTE ADULT - SUBJECTIVE AND OBJECTIVE BOX
Brief summary:  85 year old Male POD# 5 C3L with FEDERICO clip.    Overnight events:  Overnight with continued oxygen requirements on NC.     Past Medical History:  Atherosclerosis of native coronary artery without angina pectoris  Family history of heart disease (Sibling, Father)  Handoff  MEWS Score  Stroke syndrome  CAD (coronary artery disease)  LEON (dyspnea on exertion)  Myocardial ischemia  Fatigue  Hypertension  Stented coronary artery  Arthritis  Trigger finger  Pacemaker  HTN (hypertension)  Diabetes mellitus  Afib  Atrial fibrillation, unspecified type  Coronary artery disease of native artery of native heart with stable angina pectoris  Atrial fibrillation, unspecified type  Coronary artery disease of native artery of native heart with stable angina pectoris  Acute respiratory acidosis  Prophylactic measure  Former smoker  Essential hypertension  Pacemaker  Type 2 diabetes mellitus without complication, with long-term current use of insulin  Chronic atrial fibrillation  Stented coronary artery  Cardiogenic shock  S/P CABG x 3  Coronary artery disease involving native coronary artery of native heart without angina pectoris  CABG, with internal thoracic artery procurement and endoscopic procurement of saphenous vein  Status post trigger finger release  S/P angioplasty with stent  Pacemaker  ATHSCL HEART DISEASE OF NATIVE        docusate sodium 100 milliGRAM(s) Oral three times a day  dextrose 50% Injectable 50 milliLiter(s) IV Push every 15 minutes  dextrose 50% Injectable 25 milliLiter(s) IV Push every 15 minutes  enoxaparin Injectable 40 milliGRAM(s) SubCutaneous daily  oxyCODONE    IR 5 milliGRAM(s) Oral every 4 hours PRN  oxyCODONE    IR 10 milliGRAM(s) Oral every 4 hours PRN  digoxin     Tablet 0.125 milliGRAM(s) Oral daily  sodium chloride 0.9% lock flush 3 milliLiter(s) IV Push every 8 hours  pantoprazole    Tablet 40 milliGRAM(s) Oral before breakfast  insulin lispro (HumaLOG) corrective regimen sliding scale   SubCutaneous Before meals and at bedtime  aspirin enteric coated 81 milliGRAM(s) Oral daily  atorvastatin 40 milliGRAM(s) Oral at bedtime  metFORMIN 1000 milliGRAM(s) Oral two times a day with meals  metoprolol 75 milliGRAM(s) Oral two times a day  MEDICATIONS  (PRN):  oxyCODONE    IR 5 milliGRAM(s) Oral every 4 hours PRN Moderate Pain (4 - 6)  oxyCODONE    IR 10 milliGRAM(s) Oral every 4 hours PRN Breakthru / Severe Pain (7 - 10)      Daily     Daily Weight in k.3 (07 Aug 2017 05:56)                              11.9   9.8   )-----------( 178      ( 07 Aug 2017 03:57 )             35.8   0807    140  |  95<L>  |  37.0<H>  ----------------------------<  76  3.9   |  31.0<H>  |  0.91    Ca    8.2<L>      07 Aug 2017 03:57  Mg     1.9         TPro  5.8<L>  /  Alb  3.6  /  TBili  1.5  /  DBili  x   /  AST  19  /  ALT  12  /  AlkPhos  64  07      PT/INR - ( 07 Aug 2017 03:57 )   PT: 13.7 sec;   INR: 1.24 ratio               Objective:  T(C): 37.2 (17 @ 08:00), Max: 37.2 (17 @ 08:00)  HR: 92 (17 @ 08:00) (61 - 112)  BP: 128/71 (17 @ 08:00) (90/53 - 138/68)  RR: 18 (17 @ 08:00) (18 - 20)  SpO2: 98% (17 @ 06:00) (97% - 100%)  Wt(kg): --CAPILLARY BLOOD GLUCOSE  109 (07 Aug 2017 08:55)  160 (06 Aug 2017 22:00)  126 (06 Aug 2017 17:45)  184 (06 Aug 2017 12:15)      I&O's Summary    06 Aug 2017 07:01  -  07 Aug 2017 07:00  --------------------------------------------------------  IN: 1330 mL / OUT: 2027 mL / NET: -697 mL        Physical Exam  Neuro: A+O x 3, non-focal, speech clear and intact  Pulm: CTA, equal bilaterally  CV: irregularly irregular, +S1S2, PPM Right anterior chest  Abd: soft, NT, ND, +BS, +BM  Ext: +DP Pulses b/l, +PT pulses, no edema  Inc: MSI C/D/I/stable w/ dressing removed today, left C/D/I w/ dsg

## 2017-08-07 NOTE — PROGRESS NOTE ADULT - SUBJECTIVE AND OBJECTIVE BOX
INTERVAL SUBJECTIVE & REVIEW OF SYMPTOMS:  85 year old male s/p CABG with FEDERICO clip. f/u regarding rehab needs. He continues to need o2 and with limited endurance, but progressing well with bedside therapy.  Being followed by endocrine.       MEDICATIONS:  docusate sodium 100 milliGRAM(s) Oral three times a day  dextrose 50% Injectable 50 milliLiter(s) IV Push every 15 minutes  dextrose 50% Injectable 25 milliLiter(s) IV Push every 15 minutes  enoxaparin Injectable 40 milliGRAM(s) SubCutaneous daily  oxyCODONE    IR 5 milliGRAM(s) Oral every 4 hours PRN  oxyCODONE    IR 10 milliGRAM(s) Oral every 4 hours PRN  digoxin     Tablet 0.125 milliGRAM(s) Oral daily  sodium chloride 0.9% lock flush 3 milliLiter(s) IV Push every 8 hours  pantoprazole    Tablet 40 milliGRAM(s) Oral before breakfast  insulin lispro (HumaLOG) corrective regimen sliding scale   SubCutaneous Before meals and at bedtime  aspirin enteric coated 81 milliGRAM(s) Oral daily  atorvastatin 40 milliGRAM(s) Oral at bedtime  metFORMIN 1000 milliGRAM(s) Oral two times a day with meals  metoprolol 100 milliGRAM(s) Oral two times a day      REVIEW OF SYSTEMS  [   ] Constitutional WNL  [   ] Cardio WNL  [   ] Resp WNL  [ x  ] GI WNL  [ x  ]  WNL  [   ] Heme WNL  [   ] Endo WNL  [   ] Skin WNL  [   ] MSK WNL  [ x  ] Neuro WNL  [ x  ] Cognitive WNL  [x   ] Psych WNL    VITAL SIGNS  Vital Signs Last 24 Hrs  T(C): 37 (07 Aug 2017 11:40), Max: 37.2 (07 Aug 2017 08:00)  T(F): 98.6 (07 Aug 2017 11:40), Max: 98.9 (07 Aug 2017 08:00)  HR: 94 (07 Aug 2017 11:40) (61 - 102)  BP: 108/68 (07 Aug 2017 11:40) (90/53 - 138/68)  BP(mean): --  RR: 18 (07 Aug 2017 11:40) (18 - 20)  SpO2: 95% (07 Aug 2017 11:40) (95% - 100%)    PHYSICAL EXAM  General: NAD, seated in chair with nasal o2  Cardio: S1S2+  Resp: poor air entry b/l  Abdomen: soft  Extrem: no calf tenderness, motor 4/5   Skin: sternal incision:clean  Functional Exam:     RECENT LABS:                        11.9   9.8   )-----------( 178      ( 07 Aug 2017 03:57 )             35.8     08-07    140  |  95<L>  |  37.0<H>  ----------------------------<  76  3.9   |  31.0<H>  |  0.91    Ca    8.2<L>      07 Aug 2017 03:57  Mg     1.9     08-07    TPro  5.8<L>  /  Alb  3.6  /  TBili  1.5  /  DBili  x   /  AST  19  /  ALT  12  /  AlkPhos  64  08-07    PT/INR - ( 07 Aug 2017 03:57 )   PT: 13.7 sec;   INR: 1.24 ratio      RADIOLOGY/OTHER RESULTS:      < from: Xray Chest 1 View AP/PA. (08.07.17 @ 04:45) >  EXAM:  CHEST SINGLE VIEW FRONTAL                          PROCEDURE DATE:  08/07/2017          INTERPRETATION:  Chest, single portable view    HISTORY: Status post cardiac surgery, assess cardiopulmonary status and   support devices.    Comparison: 8/6    IMPRESSION:    Support Devices:  Unchanged  Heart: Cardiomegaly stable  Mediastinum:  Unremarkable  Lungs/Airways: Minimal pulmonary edema and left basilar opacity, stable.  Bones/Soft tissues: Unremarkable    A/P    85 year old male with CAD, now s/p CABG with FEDERICO clip POD 5. Patient with deconditioning after recent surgery and would benefit from course of acute rehab: PT/OT when stable to improve overall function.   Thank you.

## 2017-08-07 NOTE — PROGRESS NOTE ADULT - ASSESSMENT
85 year old male with PMH Afib, CAD with prior cardiac stent (2011), s/p PPM (BS settings DDD 61/10)), HLD, DM, and TIA; originally presented to cardiologist c/o dyspnea.   Work up requiring nuclear perfusion study was positive for moderate ischemia of moderated sized territory of the anterior wall.   8/2 s/p CABG x 3 and left atrial appendage clip. Post-op course complicated by hypoxemic respiratory failure requiring HFNC O2. Dobutamine intially used post-op for mild RV failure and pressors for post-op vasoplegia all since resolved.  8/7: Patient with continued O2 requirements overnight of 4-5L. Spoke to the patient bedside at length. Patient expressed that he is anxious and believes that he is short of breath without the oxygen. He states that his symptom prior to surgery was short of breath and feels that the shortness of breath he feels is more related to his anxiety. Patient was tried off O2 and his sat at rest was 95%, on 3L NC his sat was 100%. Plan is to ambulate with PT and gather SpO2 at rest and ambulation with and without oxygen. Patient was further educated about IS use and deep breathing. Otherwise, patient has elevated BUN so daily diuresis was discontinued and now dosed PRN. Tolerating coumadin. Lopressor up titrated due to continued tachycardia (90s-100s) on 75mg BID.

## 2017-08-07 NOTE — PROGRESS NOTE ADULT - SUBJECTIVE AND OBJECTIVE BOX
INTERVAL HPI/OVERNIGHT EVENTS:  follow up diabetes    MEDICATIONS  (STANDING):  docusate sodium 100 milliGRAM(s) Oral three times a day  dextrose 50% Injectable 50 milliLiter(s) IV Push every 15 minutes  dextrose 50% Injectable 25 milliLiter(s) IV Push every 15 minutes  enoxaparin Injectable 40 milliGRAM(s) SubCutaneous daily  digoxin     Tablet 0.125 milliGRAM(s) Oral daily  sodium chloride 0.9% lock flush 3 milliLiter(s) IV Push every 8 hours  pantoprazole    Tablet 40 milliGRAM(s) Oral before breakfast  insulin lispro (HumaLOG) corrective regimen sliding scale   SubCutaneous Before meals and at bedtime  aspirin enteric coated 81 milliGRAM(s) Oral daily  atorvastatin 40 milliGRAM(s) Oral at bedtime  metFORMIN 1000 milliGRAM(s) Oral two times a day with meals  metoprolol 75 milliGRAM(s) Oral two times a day    MEDICATIONS  (PRN):  oxyCODONE    IR 5 milliGRAM(s) Oral every 4 hours PRN Moderate Pain (4 - 6)  oxyCODONE    IR 10 milliGRAM(s) Oral every 4 hours PRN Breakthru / Severe Pain (7 - 10)      Allergies    No Known Allergies    Intolerances    epinephrine (Other)      Review of systems:  mild chest discomfort with coughing    Vital Signs Last 24 Hrs  T(C): 37.2 (07 Aug 2017 08:00), Max: 37.2 (07 Aug 2017 08:00)  T(F): 98.9 (07 Aug 2017 08:00), Max: 98.9 (07 Aug 2017 08:00)  HR: 92 (07 Aug 2017 08:00) (61 - 112)  BP: 128/71 (07 Aug 2017 08:00) (90/53 - 138/68)  BP(mean): --  RR: 18 (07 Aug 2017 08:00) (18 - 20)  SpO2: 98% (07 Aug 2017 06:00) (97% - 100%)      LABS:                        11.9   9.8   )-----------( 178      ( 07 Aug 2017 03:57 )             35.8     08-07    140  |  95<L>  |  37.0<H>  ----------------------------<  76  3.9   |  31.0<H>  |  0.91    Ca    8.2<L>      07 Aug 2017 03:57  Mg     1.9     08-07    TPro  5.8<L>  /  Alb  3.6  /  TBili  1.5  /  DBili  x   /  AST  19  /  ALT  12  /  AlkPhos  64  08-07          CAPILLARY BLOOD GLUCOSE  109 (07 Aug 2017 08:55)  160 (06 Aug 2017 22:00)  126 (06 Aug 2017 17:45)  184 (06 Aug 2017 12:15)  109 (06 Aug 2017 10:00)  106 (05 Aug 2017 20:35)  76 (05 Aug 2017 17:46)  155 (05 Aug 2017 12:21)  130 (05 Aug 2017 08:00)  128 (04 Aug 2017 22:00)  109 (04 Aug 2017 16:00)  130 (04 Aug 2017 03:00)  129 (04 Aug 2017 02:00)  126 (04 Aug 2017 01:00)  124 (04 Aug 2017 00:00)  116 (03 Aug 2017 23:00)  107 (03 Aug 2017 22:00)  97 (03 Aug 2017 21:00)  120 (03 Aug 2017 20:00)  125 (03 Aug 2017 18:00)      RADIOLOGY & ADDITIONAL TESTS:

## 2017-08-08 LAB
ALBUMIN SERPL ELPH-MCNC: 3.6 G/DL — SIGNIFICANT CHANGE UP (ref 3.3–5.2)
ALP SERPL-CCNC: 65 U/L — SIGNIFICANT CHANGE UP (ref 40–120)
ALT FLD-CCNC: 13 U/L — SIGNIFICANT CHANGE UP
ANION GAP SERPL CALC-SCNC: 15 MMOL/L — SIGNIFICANT CHANGE UP (ref 5–17)
AST SERPL-CCNC: 17 U/L — SIGNIFICANT CHANGE UP
BILIRUB SERPL-MCNC: 1.5 MG/DL — SIGNIFICANT CHANGE UP (ref 0.4–2)
BUN SERPL-MCNC: 32 MG/DL — HIGH (ref 8–20)
CALCIUM SERPL-MCNC: 8.4 MG/DL — LOW (ref 8.6–10.2)
CHLORIDE SERPL-SCNC: 97 MMOL/L — LOW (ref 98–107)
CO2 SERPL-SCNC: 28 MMOL/L — SIGNIFICANT CHANGE UP (ref 22–29)
CREAT SERPL-MCNC: 0.88 MG/DL — SIGNIFICANT CHANGE UP (ref 0.5–1.3)
GLUCOSE SERPL-MCNC: 134 MG/DL — HIGH (ref 70–115)
HCT VFR BLD CALC: 37.2 % — LOW (ref 42–52)
HGB BLD-MCNC: 11.9 G/DL — LOW (ref 14–18)
INR BLD: 1.32 RATIO — HIGH (ref 0.88–1.16)
MAGNESIUM SERPL-MCNC: 1.8 MG/DL — SIGNIFICANT CHANGE UP (ref 1.6–2.6)
MCHC RBC-ENTMCNC: 30.4 PG — SIGNIFICANT CHANGE UP (ref 27–31)
MCHC RBC-ENTMCNC: 32 G/DL — SIGNIFICANT CHANGE UP (ref 32–36)
MCV RBC AUTO: 94.9 FL — HIGH (ref 80–94)
PLATELET # BLD AUTO: 226 K/UL — SIGNIFICANT CHANGE UP (ref 150–400)
POTASSIUM SERPL-MCNC: 4.2 MMOL/L — SIGNIFICANT CHANGE UP (ref 3.5–5.3)
POTASSIUM SERPL-SCNC: 4.2 MMOL/L — SIGNIFICANT CHANGE UP (ref 3.5–5.3)
PROT SERPL-MCNC: 6 G/DL — LOW (ref 6.6–8.7)
PROTHROM AB SERPL-ACNC: 14.6 SEC — HIGH (ref 9.8–12.7)
RBC # BLD: 3.92 M/UL — LOW (ref 4.6–6.2)
RBC # FLD: 14.8 % — SIGNIFICANT CHANGE UP (ref 11–15.6)
SODIUM SERPL-SCNC: 140 MMOL/L — SIGNIFICANT CHANGE UP (ref 135–145)
WBC # BLD: 11.7 K/UL — HIGH (ref 4.8–10.8)
WBC # FLD AUTO: 11.7 K/UL — HIGH (ref 4.8–10.8)

## 2017-08-08 PROCEDURE — 71010: CPT | Mod: 26

## 2017-08-08 RX ORDER — OXYCODONE AND ACETAMINOPHEN 5; 325 MG/1; MG/1
1 TABLET ORAL EVERY 6 HOURS
Qty: 0 | Refills: 0 | Status: DISCONTINUED | OUTPATIENT
Start: 2017-08-08 | End: 2017-08-08

## 2017-08-08 RX ORDER — AZITHROMYCIN 500 MG/1
500 TABLET, FILM COATED ORAL ONCE
Qty: 0 | Refills: 0 | Status: COMPLETED | OUTPATIENT
Start: 2017-08-08 | End: 2017-08-08

## 2017-08-08 RX ORDER — MAGNESIUM SULFATE 500 MG/ML
2 VIAL (ML) INJECTION ONCE
Qty: 0 | Refills: 0 | Status: COMPLETED | OUTPATIENT
Start: 2017-08-08 | End: 2017-08-08

## 2017-08-08 RX ORDER — AZITHROMYCIN 500 MG/1
250 TABLET, FILM COATED ORAL DAILY
Qty: 0 | Refills: 0 | Status: COMPLETED | OUTPATIENT
Start: 2017-08-09 | End: 2017-08-12

## 2017-08-08 RX ORDER — WARFARIN SODIUM 2.5 MG/1
5 TABLET ORAL ONCE
Qty: 0 | Refills: 0 | Status: COMPLETED | OUTPATIENT
Start: 2017-08-08 | End: 2017-08-08

## 2017-08-08 RX ADMIN — Medication 100 MILLIGRAM(S): at 05:47

## 2017-08-08 RX ADMIN — OXYCODONE AND ACETAMINOPHEN 1 TABLET(S): 5; 325 TABLET ORAL at 22:15

## 2017-08-08 RX ADMIN — Medication 3 MILLILITER(S): at 02:34

## 2017-08-08 RX ADMIN — METFORMIN HYDROCHLORIDE 1000 MILLIGRAM(S): 850 TABLET ORAL at 17:42

## 2017-08-08 RX ADMIN — OXYCODONE AND ACETAMINOPHEN 1 TABLET(S): 5; 325 TABLET ORAL at 21:35

## 2017-08-08 RX ADMIN — AZITHROMYCIN 500 MILLIGRAM(S): 500 TABLET, FILM COATED ORAL at 09:49

## 2017-08-08 RX ADMIN — PANTOPRAZOLE SODIUM 40 MILLIGRAM(S): 20 TABLET, DELAYED RELEASE ORAL at 05:47

## 2017-08-08 RX ADMIN — OXYCODONE AND ACETAMINOPHEN 1 TABLET(S): 5; 325 TABLET ORAL at 13:52

## 2017-08-08 RX ADMIN — Medication 0.12 MILLIGRAM(S): at 05:47

## 2017-08-08 RX ADMIN — WARFARIN SODIUM 5 MILLIGRAM(S): 2.5 TABLET ORAL at 21:35

## 2017-08-08 RX ADMIN — Medication 50 GRAM(S): at 09:49

## 2017-08-08 RX ADMIN — Medication 100 MILLIGRAM(S): at 21:35

## 2017-08-08 RX ADMIN — Medication 2: at 11:35

## 2017-08-08 RX ADMIN — SODIUM CHLORIDE 3 MILLILITER(S): 9 INJECTION INTRAMUSCULAR; INTRAVENOUS; SUBCUTANEOUS at 13:00

## 2017-08-08 RX ADMIN — Medication 100 MILLIGRAM(S): at 13:52

## 2017-08-08 RX ADMIN — SODIUM CHLORIDE 3 MILLILITER(S): 9 INJECTION INTRAMUSCULAR; INTRAVENOUS; SUBCUTANEOUS at 21:32

## 2017-08-08 RX ADMIN — Medication 100 MILLIGRAM(S): at 17:42

## 2017-08-08 RX ADMIN — OXYCODONE AND ACETAMINOPHEN 1 TABLET(S): 5; 325 TABLET ORAL at 14:52

## 2017-08-08 RX ADMIN — Medication 81 MILLIGRAM(S): at 11:36

## 2017-08-08 RX ADMIN — METFORMIN HYDROCHLORIDE 1000 MILLIGRAM(S): 850 TABLET ORAL at 09:49

## 2017-08-08 RX ADMIN — ENOXAPARIN SODIUM 40 MILLIGRAM(S): 100 INJECTION SUBCUTANEOUS at 21:35

## 2017-08-08 RX ADMIN — ATORVASTATIN CALCIUM 40 MILLIGRAM(S): 80 TABLET, FILM COATED ORAL at 21:35

## 2017-08-08 RX ADMIN — SODIUM CHLORIDE 3 MILLILITER(S): 9 INJECTION INTRAMUSCULAR; INTRAVENOUS; SUBCUTANEOUS at 05:46

## 2017-08-08 NOTE — PROGRESS NOTE ADULT - ASSESSMENT
85 year old male with PMH Afib, CAD with prior cardiac stent (2011), s/p PPM (BS settings DDD 61/10)), HLD, DM, and TIA; originally presented to cardiologist c/o dyspnea.   Work up requiring nuclear perfusion study was positive for moderate ischemia of moderated sized territory of the anterior wall.     8/2 s/p CABG x 3 and left atrial appendage clip.     Post-op course complicated by hypoxemic respiratory failure requiring HFNC O2. Dobutamine intially used post-op for mild RV failure and pressors for post-op vasoplegia all since resolved.  8/7: Patient with continued O2 requirements overnight of 4-5L. Lasix 40mg x 1 given.  Lopressor increased to 100mg BID.

## 2017-08-08 NOTE — PROGRESS NOTE ADULT - SUBJECTIVE AND OBJECTIVE BOX
Subjective: "I didn't sleep at all lastnight.  I kept coughing."  Sitting up in chair.  Denies SOB or CP.  NAD noted.      Tele: Afib                           T(F): 97 (17 @ 05:51), Max: 98.7 (17 @ 16:17)  HR: 97 (17 @ 05:51) (75 - 100)  BP: 106/68 (17 @ 05:51) (96/62 - 120/56)  RR: 18 (17 @ 05:51) (18 - 20)  SpO2: 95% (17 @ 01:54) (95% - 97%) on 3 liters nasal O2.          Daily     Daily Weight in k.4 (08 Aug 2017 06:00)    LV EF: 60-65%    Allergies:  epinephrine (Other)          140  |  97<L>  |  32.0<H>  ----------------------------<  134<H>  4.2   |  28.0  |  0.88    Ca    8.4<L>      08 Aug 2017 06:36  Mg     1.8         TPro  6.0<L>  /  Alb  3.6  /  TBili  1.5  /  DBili  x   /  AST  17  /  ALT  13  /  AlkPhos  65                                 11.9   11.7  )-----------( 226      ( 08 Aug 2017 07:23 )             37.2        PT/INR - ( 08 Aug 2017 06:42 )   PT: 14.6 sec;   INR: 1.32 ratio                CAPILLARY BLOOD GLUCOSE  111 (08 Aug 2017 07:22)  82 (07 Aug 2017 21:50)  92 (07 Aug 2017 17:03)  205 (07 Aug 2017 11:38)               CXR: RLL atelectasis.  LLL infiltrate vs atelectasis.      I&O's Detail    07 Aug 2017 07:01  -  08 Aug 2017 07:00  --------------------------------------------------------  IN:    Oral Fluid: 1200 mL  Total IN: 1200 mL    OUT:    Stool: 1 mL    Voided: 1160 mL  Total OUT: 1161 mL    Total NET: 39 mL          CHEST TUBE:  [ ] YES [x ] NO  OUTPUT:     per 24 hours    AIR LEAKS:  [ ] YES [ ] NO      NORM DRAIN:   [ ] YES [x ] NO  OUTPUT:     per 24 hours    EPICARDIAL WIRES:  [ ] YES [x ] NO      BOWEL MOVEMENT:  [x ] YES [ ] NO        Active Medications:  docusate sodium 100 milliGRAM(s) Oral three times a day  dextrose 50% Injectable 50 milliLiter(s) IV Push every 15 minutes  dextrose 50% Injectable 25 milliLiter(s) IV Push every 15 minutes  enoxaparin Injectable 40 milliGRAM(s) SubCutaneous daily  oxyCODONE    IR 5 milliGRAM(s) Oral every 4 hours PRN  oxyCODONE    IR 10 milliGRAM(s) Oral every 4 hours PRN  digoxin     Tablet 0.125 milliGRAM(s) Oral daily  sodium chloride 0.9% lock flush 3 milliLiter(s) IV Push every 8 hours  pantoprazole    Tablet 40 milliGRAM(s) Oral before breakfast  insulin lispro (HumaLOG) corrective regimen sliding scale   SubCutaneous Before meals and at bedtime  aspirin enteric coated 81 milliGRAM(s) Oral daily  atorvastatin 40 milliGRAM(s) Oral at bedtime  metFORMIN 1000 milliGRAM(s) Oral two times a day with meals  metoprolol 100 milliGRAM(s) Oral two times a day  benzonatate 100 milliGRAM(s) Oral three times a day PRN  warfarin 5 milliGRAM(s) Oral once      Physical Exam:    Neuro: AAOX3.  No focal deficits.    Pulm: Diminished BLL. L>R.    CV: Irregular.  +S1+S2.    Abd: Soft/NT/ND.  +BS.  +BM 8/7 per pt.    Extremities: Trace edema BLE.  +pp.    Incision(s): MSI LUCY and without erythema or drainage.  Sternum stable.  RLE EVH site LUCY and without erythema or drainage.                PAST MEDICAL & SURGICAL HISTORY:  Stroke syndrome  CAD (coronary artery disease)  LEON (dyspnea on exertion)  Myocardial ischemia  Fatigue  Hypertension  Stented coronary artery: ELLIE x 1  () -- [facility]  Arthritis  Trigger finger  Pacemaker  HTN (hypertension)  Diabetes mellitus  Afib  Status post trigger finger release: Multiple in the past  S/P angioplasty with stent  Pacemaker:

## 2017-08-08 NOTE — PROGRESS NOTE ADULT - PROBLEM SELECTOR PLAN 2
S/P FEDERICO clip.  Coumadin daily per INR.  May consider transition to Atenolol if increased rate persists.  Continue Digoxin.

## 2017-08-09 LAB
ANION GAP SERPL CALC-SCNC: 13 MMOL/L — SIGNIFICANT CHANGE UP (ref 5–17)
BUN SERPL-MCNC: 29 MG/DL — HIGH (ref 8–20)
CALCIUM SERPL-MCNC: 8.6 MG/DL — SIGNIFICANT CHANGE UP (ref 8.6–10.2)
CHLORIDE SERPL-SCNC: 97 MMOL/L — LOW (ref 98–107)
CO2 SERPL-SCNC: 31 MMOL/L — HIGH (ref 22–29)
CREAT SERPL-MCNC: 0.84 MG/DL — SIGNIFICANT CHANGE UP (ref 0.5–1.3)
DIGOXIN SERPL-MCNC: 1.6 NG/ML — SIGNIFICANT CHANGE UP (ref 0.8–2)
GLUCOSE SERPL-MCNC: 126 MG/DL — HIGH (ref 70–115)
HCT VFR BLD CALC: 36.5 % — LOW (ref 42–52)
HGB BLD-MCNC: 11.9 G/DL — LOW (ref 14–18)
INR BLD: 1.36 RATIO — HIGH (ref 0.88–1.16)
MAGNESIUM SERPL-MCNC: 1.9 MG/DL — SIGNIFICANT CHANGE UP (ref 1.6–2.6)
MCHC RBC-ENTMCNC: 31.2 PG — HIGH (ref 27–31)
MCHC RBC-ENTMCNC: 32.6 G/DL — SIGNIFICANT CHANGE UP (ref 32–36)
MCV RBC AUTO: 95.5 FL — HIGH (ref 80–94)
PHOSPHATE SERPL-MCNC: 2.7 MG/DL — SIGNIFICANT CHANGE UP (ref 2.4–4.7)
PLATELET # BLD AUTO: 207 K/UL — SIGNIFICANT CHANGE UP (ref 150–400)
POTASSIUM SERPL-MCNC: 5 MMOL/L — SIGNIFICANT CHANGE UP (ref 3.5–5.3)
POTASSIUM SERPL-SCNC: 5 MMOL/L — SIGNIFICANT CHANGE UP (ref 3.5–5.3)
PROTHROM AB SERPL-ACNC: 15 SEC — HIGH (ref 9.8–12.7)
RBC # BLD: 3.82 M/UL — LOW (ref 4.6–6.2)
RBC # FLD: 15.1 % — SIGNIFICANT CHANGE UP (ref 11–15.6)
SODIUM SERPL-SCNC: 141 MMOL/L — SIGNIFICANT CHANGE UP (ref 135–145)
WBC # BLD: 12.4 K/UL — HIGH (ref 4.8–10.8)
WBC # FLD AUTO: 12.4 K/UL — HIGH (ref 4.8–10.8)

## 2017-08-09 PROCEDURE — 71010: CPT | Mod: 26

## 2017-08-09 RX ORDER — FUROSEMIDE 40 MG
40 TABLET ORAL DAILY
Qty: 0 | Refills: 0 | Status: DISCONTINUED | OUTPATIENT
Start: 2017-08-09 | End: 2017-08-17

## 2017-08-09 RX ORDER — MAGNESIUM SULFATE 500 MG/ML
2 VIAL (ML) INJECTION ONCE
Qty: 0 | Refills: 0 | Status: COMPLETED | OUTPATIENT
Start: 2017-08-09 | End: 2017-08-09

## 2017-08-09 RX ORDER — WARFARIN SODIUM 2.5 MG/1
5 TABLET ORAL ONCE
Qty: 0 | Refills: 0 | Status: COMPLETED | OUTPATIENT
Start: 2017-08-09 | End: 2017-08-09

## 2017-08-09 RX ADMIN — Medication 100 MILLIGRAM(S): at 22:19

## 2017-08-09 RX ADMIN — ENOXAPARIN SODIUM 40 MILLIGRAM(S): 100 INJECTION SUBCUTANEOUS at 22:19

## 2017-08-09 RX ADMIN — Medication 50 GRAM(S): at 09:07

## 2017-08-09 RX ADMIN — Medication 0.12 MILLIGRAM(S): at 05:26

## 2017-08-09 RX ADMIN — METFORMIN HYDROCHLORIDE 1000 MILLIGRAM(S): 850 TABLET ORAL at 09:07

## 2017-08-09 RX ADMIN — Medication 2: at 22:19

## 2017-08-09 RX ADMIN — SODIUM CHLORIDE 3 MILLILITER(S): 9 INJECTION INTRAMUSCULAR; INTRAVENOUS; SUBCUTANEOUS at 05:29

## 2017-08-09 RX ADMIN — Medication 100 MILLIGRAM(S): at 05:26

## 2017-08-09 RX ADMIN — Medication 100 MILLIGRAM(S): at 18:06

## 2017-08-09 RX ADMIN — SODIUM CHLORIDE 3 MILLILITER(S): 9 INJECTION INTRAMUSCULAR; INTRAVENOUS; SUBCUTANEOUS at 21:03

## 2017-08-09 RX ADMIN — METFORMIN HYDROCHLORIDE 1000 MILLIGRAM(S): 850 TABLET ORAL at 18:05

## 2017-08-09 RX ADMIN — ATORVASTATIN CALCIUM 40 MILLIGRAM(S): 80 TABLET, FILM COATED ORAL at 22:19

## 2017-08-09 RX ADMIN — Medication 2: at 12:18

## 2017-08-09 RX ADMIN — PANTOPRAZOLE SODIUM 40 MILLIGRAM(S): 20 TABLET, DELAYED RELEASE ORAL at 05:26

## 2017-08-09 RX ADMIN — Medication 40 MILLIGRAM(S): at 12:17

## 2017-08-09 RX ADMIN — Medication 81 MILLIGRAM(S): at 12:18

## 2017-08-09 RX ADMIN — AZITHROMYCIN 250 MILLIGRAM(S): 500 TABLET, FILM COATED ORAL at 12:17

## 2017-08-09 RX ADMIN — WARFARIN SODIUM 5 MILLIGRAM(S): 2.5 TABLET ORAL at 22:19

## 2017-08-09 RX ADMIN — SODIUM CHLORIDE 3 MILLILITER(S): 9 INJECTION INTRAMUSCULAR; INTRAVENOUS; SUBCUTANEOUS at 13:05

## 2017-08-09 RX ADMIN — Medication 100 MILLIGRAM(S): at 12:20

## 2017-08-09 NOTE — PROGRESS NOTE ADULT - SUBJECTIVE AND OBJECTIVE BOX
Subjective: "My cough is much better this morning."  Sitting up in bed.  Denies SOB or CP.  NAD noted.      Tele: Afib with occasional pacing.  Episodes of brief/self resolving REGINALD 130-150 overnight.                      T(F): 98.2 (17 @ 04:55), Max: 98.8 (17 @ 17:05)  HR: 112 (17 @ 04:55) (86 - 112)  BP: 112/74 (17 @ 04:55) (104/66 - 117/74)  RR: 16 (17 @ 04:55) (16 - 20)  SpO2: 96% (17 @ 20:05) (96% - 99%) on 4 liters nasal O2.          Daily     Daily Weight in k (09 Aug 2017 06:00)    LV EF: 60-65%      Allergies:  epinephrine (Other)          141  |  97<L>  |  29.0<H>  ----------------------------<  126<H>  5.0   |  31.0<H>  |  0.84    Ca    8.6      09 Aug 2017 06:09  Phos  2.7       Mg     1.9     -09    TPro  6.0<L>  /  Alb  3.6  /  TBili  1.5  /  DBili  x   /  AST  17  /  ALT  13  /  AlkPhos  65  -08                               11.9   12.4  )-----------( 207      ( 09 Aug 2017 06:09 )             36.5        PT/INR - ( 09 Aug 2017 06:09 )   PT: 15.0 sec;   INR: 1.36 ratio                CAPILLARY BLOOD GLUCOSE  135 (08 Aug 2017 20:58)  113 (08 Aug 2017 17:05)  186 (08 Aug 2017 11:22)           CXR:   Xray Chest 1 View AP/PA. (17 @ 04:57) >  Comparison exam: 2017 3:51 AM.    Findings:  The lungs are clear. No evidence of pulmonary vascular congestion. The   heart is not enlarged. Post cardiac surgery changes. No evidence of   pleural effusion or pneumothorax..    Impression:  No evidence of acute cardiopulmonary disease. Resolution of left pleural   effusion since the prior study.    RAGHAV ESCAMILLA M.D., ATTENDING RADIOLOGIST  This document has been electronically signed. Aug  9 2017  8:24AM    I&O's Detail    08 Aug 2017 07:01  -  09 Aug 2017 07:00  --------------------------------------------------------  IN:    Oral Fluid: 720 mL    Solution: 50 mL  Total IN: 770 mL    OUT:    Stool: 2 mL    Voided: 1030 mL  Total OUT: 1032 mL    Total NET: -262 mL          CHEST TUBE:  [ ] YES [x ] NO  OUTPUT:     per 24 hours    AIR LEAKS:  [ ] YES [ ] NO      NORM DRAIN:   [ ] YES [x ] NO  OUTPUT:     per 24 hours    EPICARDIAL WIRES:  [ ] YES  x] NO      BOWEL MOVEMENT:  [x ] YES [ ] NO        Active Medications:  docusate sodium 100 milliGRAM(s) Oral three times a day  dextrose 50% Injectable 50 milliLiter(s) IV Push every 15 minutes  dextrose 50% Injectable 25 milliLiter(s) IV Push every 15 minutes  enoxaparin Injectable 40 milliGRAM(s) SubCutaneous daily  digoxin     Tablet 0.125 milliGRAM(s) Oral daily  sodium chloride 0.9% lock flush 3 milliLiter(s) IV Push every 8 hours  pantoprazole    Tablet 40 milliGRAM(s) Oral before breakfast  insulin lispro (HumaLOG) corrective regimen sliding scale   SubCutaneous Before meals and at bedtime  aspirin enteric coated 81 milliGRAM(s) Oral daily  atorvastatin 40 milliGRAM(s) Oral at bedtime  metFORMIN 1000 milliGRAM(s) Oral two times a day with meals  metoprolol 100 milliGRAM(s) Oral two times a day  benzonatate 100 milliGRAM(s) Oral three times a day PRN  azithromycin   Tablet 250 milliGRAM(s) Oral daily  oxyCODONE    5 mG/acetaminophen 325 mG 1 Tablet(s) Oral every 6 hours PRN  magnesium sulfate  IVPB 2 Gram(s) IV Intermittent once      Physical Exam:    Neuro: AAOX3.  No focal deficits.    Pulm: Diminished BLL.    CV: Irregular.  +S1+S2.    Abd: Soft/NT/ND.  +BS.  +BM 8/8 per pt.    Extremities: Trace edema BLE.  +pp.      Incision(s): MSI LUCY and without erythema or drainage.  Sternum stable.  RLE EVH site LUCY and without erythema or drainage.                 PAST MEDICAL & SURGICAL HISTORY:  Stroke syndrome  CAD (coronary artery disease)  LEON (dyspnea on exertion)  Myocardial ischemia  Fatigue  Hypertension  Stented coronary artery: ELLIE x 1  () -- [facility]  Arthritis  Trigger finger  Pacemaker  HTN (hypertension)  Diabetes mellitus  Afib  Status post trigger finger release: Multiple in the past  S/P angioplasty with stent  Pacemaker:

## 2017-08-09 NOTE — PROGRESS NOTE ADULT - ASSESSMENT
85 year old male with PMH Afib, CAD with prior cardiac stent (2011), s/p PPM (BS settings DDD 61/10)), HLD, DM, and TIA; originally presented to cardiologist c/o dyspnea.   Work up requiring nuclear perfusion study was positive for moderate ischemia of moderated sized territory of the anterior wall.     8/2 s/p CABG x 3 and left atrial appendage clip.     Post-op course complicated by hypoxemic respiratory failure requiring HFNC O2. Dobutamine intially used post-op for mild RV failure and pressors for post-op vasoplegia all since resolved.  8/7: Patient with continued O2 requirements overnight of 4-5L. Lasix 40mg x 1 given.  Lopressor increased to 100mg BID.  8/8  +Cough.  Suspected Tracheobronchitis.  Zithromax and Tessalon started.

## 2017-08-10 LAB
ALBUMIN SERPL ELPH-MCNC: 3.3 G/DL — SIGNIFICANT CHANGE UP (ref 3.3–5.2)
ALP SERPL-CCNC: 63 U/L — SIGNIFICANT CHANGE UP (ref 40–120)
ALT FLD-CCNC: 13 U/L — SIGNIFICANT CHANGE UP
ANION GAP SERPL CALC-SCNC: 12 MMOL/L — SIGNIFICANT CHANGE UP (ref 5–17)
AST SERPL-CCNC: 19 U/L — SIGNIFICANT CHANGE UP
BILIRUB SERPL-MCNC: 1.2 MG/DL — SIGNIFICANT CHANGE UP (ref 0.4–2)
BUN SERPL-MCNC: 31 MG/DL — HIGH (ref 8–20)
CALCIUM SERPL-MCNC: 8.4 MG/DL — LOW (ref 8.6–10.2)
CHLORIDE SERPL-SCNC: 97 MMOL/L — LOW (ref 98–107)
CO2 SERPL-SCNC: 30 MMOL/L — HIGH (ref 22–29)
CREAT SERPL-MCNC: 0.91 MG/DL — SIGNIFICANT CHANGE UP (ref 0.5–1.3)
GLUCOSE SERPL-MCNC: 130 MG/DL — HIGH (ref 70–115)
HCT VFR BLD CALC: 34.6 % — LOW (ref 42–52)
HGB BLD-MCNC: 11.2 G/DL — LOW (ref 14–18)
INR BLD: 1.64 RATIO — HIGH (ref 0.88–1.16)
MAGNESIUM SERPL-MCNC: 1.7 MG/DL — SIGNIFICANT CHANGE UP (ref 1.6–2.6)
MCHC RBC-ENTMCNC: 30.9 PG — SIGNIFICANT CHANGE UP (ref 27–31)
MCHC RBC-ENTMCNC: 32.4 G/DL — SIGNIFICANT CHANGE UP (ref 32–36)
MCV RBC AUTO: 95.3 FL — HIGH (ref 80–94)
PHOSPHATE SERPL-MCNC: 3.2 MG/DL — SIGNIFICANT CHANGE UP (ref 2.4–4.7)
PLATELET # BLD AUTO: 237 K/UL — SIGNIFICANT CHANGE UP (ref 150–400)
POTASSIUM SERPL-MCNC: 4.4 MMOL/L — SIGNIFICANT CHANGE UP (ref 3.5–5.3)
POTASSIUM SERPL-SCNC: 4.4 MMOL/L — SIGNIFICANT CHANGE UP (ref 3.5–5.3)
PROT SERPL-MCNC: 5.9 G/DL — LOW (ref 6.6–8.7)
PROTHROM AB SERPL-ACNC: 18.2 SEC — HIGH (ref 9.8–12.7)
RBC # BLD: 3.63 M/UL — LOW (ref 4.6–6.2)
RBC # FLD: 14.9 % — SIGNIFICANT CHANGE UP (ref 11–15.6)
SODIUM SERPL-SCNC: 139 MMOL/L — SIGNIFICANT CHANGE UP (ref 135–145)
WBC # BLD: 10.9 K/UL — HIGH (ref 4.8–10.8)
WBC # FLD AUTO: 10.9 K/UL — HIGH (ref 4.8–10.8)

## 2017-08-10 PROCEDURE — 71010: CPT | Mod: 26

## 2017-08-10 PROCEDURE — 93010 ELECTROCARDIOGRAM REPORT: CPT

## 2017-08-10 RX ORDER — INSULIN LISPRO 100/ML
VIAL (ML) SUBCUTANEOUS
Qty: 0 | Refills: 0 | Status: DISCONTINUED | OUTPATIENT
Start: 2017-08-10 | End: 2017-08-17

## 2017-08-10 RX ORDER — METFORMIN HYDROCHLORIDE 850 MG/1
850 TABLET ORAL
Qty: 0 | Refills: 0 | Status: DISCONTINUED | OUTPATIENT
Start: 2017-08-10 | End: 2017-08-17

## 2017-08-10 RX ORDER — WARFARIN SODIUM 2.5 MG/1
7.5 TABLET ORAL ONCE
Qty: 0 | Refills: 0 | Status: COMPLETED | OUTPATIENT
Start: 2017-08-10 | End: 2017-08-10

## 2017-08-10 RX ORDER — MAGNESIUM SULFATE 500 MG/ML
2 VIAL (ML) INJECTION ONCE
Qty: 0 | Refills: 0 | Status: COMPLETED | OUTPATIENT
Start: 2017-08-10 | End: 2017-08-10

## 2017-08-10 RX ADMIN — Medication 100 MILLIGRAM(S): at 17:54

## 2017-08-10 RX ADMIN — Medication 40 MILLIGRAM(S): at 05:39

## 2017-08-10 RX ADMIN — SODIUM CHLORIDE 3 MILLILITER(S): 9 INJECTION INTRAMUSCULAR; INTRAVENOUS; SUBCUTANEOUS at 05:40

## 2017-08-10 RX ADMIN — ATORVASTATIN CALCIUM 40 MILLIGRAM(S): 80 TABLET, FILM COATED ORAL at 22:42

## 2017-08-10 RX ADMIN — WARFARIN SODIUM 7.5 MILLIGRAM(S): 2.5 TABLET ORAL at 22:36

## 2017-08-10 RX ADMIN — SODIUM CHLORIDE 3 MILLILITER(S): 9 INJECTION INTRAMUSCULAR; INTRAVENOUS; SUBCUTANEOUS at 11:18

## 2017-08-10 RX ADMIN — ENOXAPARIN SODIUM 40 MILLIGRAM(S): 100 INJECTION SUBCUTANEOUS at 22:36

## 2017-08-10 RX ADMIN — Medication 1: at 22:36

## 2017-08-10 RX ADMIN — PANTOPRAZOLE SODIUM 40 MILLIGRAM(S): 20 TABLET, DELAYED RELEASE ORAL at 05:38

## 2017-08-10 RX ADMIN — AZITHROMYCIN 250 MILLIGRAM(S): 500 TABLET, FILM COATED ORAL at 11:19

## 2017-08-10 RX ADMIN — Medication 100 MILLIGRAM(S): at 05:38

## 2017-08-10 RX ADMIN — Medication 100 MILLIGRAM(S): at 11:19

## 2017-08-10 RX ADMIN — Medication 100 MILLIGRAM(S): at 08:54

## 2017-08-10 RX ADMIN — Medication 2: at 13:07

## 2017-08-10 RX ADMIN — Medication 50 GRAM(S): at 17:59

## 2017-08-10 RX ADMIN — SODIUM CHLORIDE 3 MILLILITER(S): 9 INJECTION INTRAMUSCULAR; INTRAVENOUS; SUBCUTANEOUS at 22:42

## 2017-08-10 RX ADMIN — Medication 100 MILLIGRAM(S): at 22:37

## 2017-08-10 RX ADMIN — METFORMIN HYDROCHLORIDE 850 MILLIGRAM(S): 850 TABLET ORAL at 19:23

## 2017-08-10 RX ADMIN — METFORMIN HYDROCHLORIDE 1000 MILLIGRAM(S): 850 TABLET ORAL at 08:54

## 2017-08-10 RX ADMIN — Medication 0.12 MILLIGRAM(S): at 05:38

## 2017-08-10 RX ADMIN — Medication 81 MILLIGRAM(S): at 11:19

## 2017-08-10 NOTE — PROGRESS NOTE ADULT - ASSESSMENT
85 year old male with PMH Afib, CAD with prior cardiac stent (2011), s/p PPM (BS settings DDD 61/10)), HLD, DM, and TIA; originally presented to cardiologist c/o dyspnea.   Work up requiring nuclear perfusion study was positive for moderate ischemia of moderated sized territory of the anterior wall.     8/2 s/p CABG x 3 and left atrial appendage clip.     Post-op course complicated by hypoxemic acute respiratory failure requiring HFNC O2. Dobutamine intially used post-op for mild RV failure and pressors for post-op vasoplegia all since resolved.  8/7: Patient with continued O2 requirements overnight of 4-5L. Lasix 40mg x 1 given.  Lopressor increased to 100mg BID.  8/8  +Cough.  Suspected Tracheobronchitis.  Zithromax and Tessalon started. > improved symptoms  8/9 Accepted to acute rehab, awaiting authorization from insurance.

## 2017-08-10 NOTE — CHART NOTE - NSCHARTNOTEFT_GEN_A_CORE
Aware pt s/p cardiac sx, hgba1c 7.5%.  Pt reports having diabetes for many years and has been doing well with Glipizide and Metformin.  Pt consumes 3 meals/day, owns glucometer, but does not test often.  Pt denies hypoglycemia in the past.  Briefly reviewed hgba1c results, cons cho meal plan and importance of medication management.  Provided education about the risk of hypoglycemia while taking sulfonylurea and reviewed s/s and treatment for hypoglycemia should it ever occur.  Pt receptive and verbalized understanding.  Literature provided.  RD CDE to remain available.    Recommendations:  Continue with diabetes self management education.

## 2017-08-10 NOTE — PROGRESS NOTE ADULT - SUBJECTIVE AND OBJECTIVE BOX
Subjective:  "I feel pretty good. Better, I think. Only problem is this oxygen." Denies CP, SOB. Cough is improved.     VITAL SIGNS  Vital Signs Last 24 Hrs  T(C): 36.5 (08-10-17 @ 10:11), Max: 37.1 (08-10-17 @ 01:06)  T(F): 97.7 (08-10-17 @ 10:11), Max: 98.8 (08-10-17 @ 01:06)  HR: 97 (08-10-17 @ 10:11) (89 - 103)  BP: 102/67 (08-10-17 @ 10:11) (102/66 - 126/68)  RR: 16 (08-10-17 @ 10:11) (15 - 18)  SpO2: 97% (08-10-17 @ 05:32) (95% - 98%)  on 4L NC             Telemetry/Alarms:  SR 70-90  LVEF: normal    MEDICATIONS  docusate sodium 100 milliGRAM(s) Oral three times a day  dextrose 50% Injectable 50 milliLiter(s) IV Push every 15 minutes  dextrose 50% Injectable 25 milliLiter(s) IV Push every 15 minutes  enoxaparin Injectable 40 milliGRAM(s) SubCutaneous daily  digoxin     Tablet 0.125 milliGRAM(s) Oral daily  sodium chloride 0.9% lock flush 3 milliLiter(s) IV Push every 8 hours  pantoprazole    Tablet 40 milliGRAM(s) Oral before breakfast  insulin lispro (HumaLOG) corrective regimen sliding scale   SubCutaneous Before meals and at bedtime  aspirin enteric coated 81 milliGRAM(s) Oral daily  atorvastatin 40 milliGRAM(s) Oral at bedtime  metFORMIN 1000 milliGRAM(s) Oral two times a day with meals  metoprolol 100 milliGRAM(s) Oral two times a day  benzonatate 100 milliGRAM(s) Oral three times a day PRN  azithromycin   Tablet 250 milliGRAM(s) Oral daily  oxyCODONE    5 mG/acetaminophen 325 mG 1 Tablet(s) Oral every 6 hours PRN  furosemide    Tablet 40 milliGRAM(s) Oral daily  warfarin 7.5 milliGRAM(s) Oral once      PHYSICAL EXAM  General: well nourished, well developed, no acute distress  Neurology: alert and oriented x 3, nonfocal, no gross deficits  Respiratory: clear to auscultation bilaterally  CV: regular rate and rhythm, normal S1, S2  Abdomen: soft, nontender, nondistended, positive bowel sounds, last bowel movement   Extremities: warm, well perfused. +1 edema RLE. + DP pulses  Incisions: midline sternal incision,   c/d/i. sternum stable. RLE [facility] site c/d/i, + thigh eccymosis      08 @ 07:01  -  08-10 @ 07:00  --------------------------------------------------------  IN: 410 mL / OUT: 720 mL / NET: -310 mL    08-10 @ 07:  -  08-10 @ 11:22  --------------------------------------------------------  IN: 0 mL / OUT: 400 mL / NET: -400 mL        Weights:  Daily     Daily Weight in k.3 (10 Aug 2017 00:11)  Admit Wt: Drug Dosing Weight  Height (cm): 160.02 (02 Aug 2017 08:02)  Weight (kg): 68 (06 Aug 2017 05:18)  BMI (kg/m2): 26.6 (06 Aug 2017 05:18)  BSA (m2): 1.71 (06 Aug 2017 05:18)    LABS  08-10    139  |  97<L>  |  31.0<H>  ----------------------------<  130<H>  4.4   |  30.0<H>  |  0.91    Ca    8.4<L>      10 Aug 2017 06:03  Phos  3.2     08-10  Mg     1.7     08-10    TPro  5.9<L>  /  Alb  3.3  /  TBili  1.2  /  DBili  x   /  AST  19  /  ALT  13  /  AlkPhos  63  08-10                                 11.2   10.9  )-----------( 237      ( 10 Aug 2017 06:03 )             34.6          PT/INR - ( 10 Aug 2017 06:03 )   PT: 18.2 sec;   INR: 1.64 ratio           Bilirubin Total, Serum: 1.2 mg/dL (08-10 @ 06:03)    CAPILLARY BLOOD GLUCOSE  74 (10 Aug 2017 08:22)  123 (09 Aug 2017 16:26)  155 (09 Aug 2017 12:26)    Today's CXR: improved    Today's EKG:     PAST MEDICAL & SURGICAL HISTORY:  Stroke syndrome  CAD (coronary artery disease)  LEON (dyspnea on exertion)  Myocardial ischemia  Fatigue  Hypertension  Stented coronary artery: ELLIE x 1  () -- [facility]  Arthritis  Trigger finger  Pacemaker  HTN (hypertension)  Diabetes mellitus  Afib  Status post trigger finger release: Multiple in the past  S/P angioplasty with stent  Pacemaker:

## 2017-08-11 ENCOUNTER — TRANSCRIPTION ENCOUNTER (OUTPATIENT)
Age: 82
End: 2017-08-11

## 2017-08-11 LAB
ALBUMIN SERPL ELPH-MCNC: 3.6 G/DL — SIGNIFICANT CHANGE UP (ref 3.3–5.2)
ALP SERPL-CCNC: 73 U/L — SIGNIFICANT CHANGE UP (ref 40–120)
ALT FLD-CCNC: 16 U/L — SIGNIFICANT CHANGE UP
ANION GAP SERPL CALC-SCNC: 13 MMOL/L — SIGNIFICANT CHANGE UP (ref 5–17)
AST SERPL-CCNC: 23 U/L — SIGNIFICANT CHANGE UP
BILIRUB SERPL-MCNC: 1.2 MG/DL — SIGNIFICANT CHANGE UP (ref 0.4–2)
BUN SERPL-MCNC: 27 MG/DL — HIGH (ref 8–20)
CALCIUM SERPL-MCNC: 8.6 MG/DL — SIGNIFICANT CHANGE UP (ref 8.6–10.2)
CHLORIDE SERPL-SCNC: 95 MMOL/L — LOW (ref 98–107)
CO2 SERPL-SCNC: 32 MMOL/L — HIGH (ref 22–29)
CREAT SERPL-MCNC: 0.87 MG/DL — SIGNIFICANT CHANGE UP (ref 0.5–1.3)
GLUCOSE SERPL-MCNC: 142 MG/DL — HIGH (ref 70–115)
HCT VFR BLD CALC: 35.6 % — LOW (ref 42–52)
HGB BLD-MCNC: 11.5 G/DL — LOW (ref 14–18)
INR BLD: 2.06 RATIO — HIGH (ref 0.88–1.16)
INR BLD: 2.37 RATIO — HIGH (ref 0.88–1.16)
MCHC RBC-ENTMCNC: 30.6 PG — SIGNIFICANT CHANGE UP (ref 27–31)
MCHC RBC-ENTMCNC: 32.3 G/DL — SIGNIFICANT CHANGE UP (ref 32–36)
MCV RBC AUTO: 94.7 FL — HIGH (ref 80–94)
PLATELET # BLD AUTO: 252 K/UL — SIGNIFICANT CHANGE UP (ref 150–400)
POTASSIUM SERPL-MCNC: 4.5 MMOL/L — SIGNIFICANT CHANGE UP (ref 3.5–5.3)
POTASSIUM SERPL-SCNC: 4.5 MMOL/L — SIGNIFICANT CHANGE UP (ref 3.5–5.3)
PROT SERPL-MCNC: 6.3 G/DL — LOW (ref 6.6–8.7)
PROTHROM AB SERPL-ACNC: 23 SEC — HIGH (ref 9.8–12.7)
PROTHROM AB SERPL-ACNC: 26.5 SEC — HIGH (ref 9.8–12.7)
RBC # BLD: 3.76 M/UL — LOW (ref 4.6–6.2)
RBC # FLD: 14.9 % — SIGNIFICANT CHANGE UP (ref 11–15.6)
SODIUM SERPL-SCNC: 140 MMOL/L — SIGNIFICANT CHANGE UP (ref 135–145)
WBC # BLD: 9 K/UL — SIGNIFICANT CHANGE UP (ref 4.8–10.8)
WBC # FLD AUTO: 9 K/UL — SIGNIFICANT CHANGE UP (ref 4.8–10.8)

## 2017-08-11 RX ADMIN — SODIUM CHLORIDE 3 MILLILITER(S): 9 INJECTION INTRAMUSCULAR; INTRAVENOUS; SUBCUTANEOUS at 05:12

## 2017-08-11 RX ADMIN — Medication 100 MILLIGRAM(S): at 21:52

## 2017-08-11 RX ADMIN — Medication 0.12 MILLIGRAM(S): at 05:12

## 2017-08-11 RX ADMIN — AZITHROMYCIN 250 MILLIGRAM(S): 500 TABLET, FILM COATED ORAL at 12:42

## 2017-08-11 RX ADMIN — Medication 100 MILLIGRAM(S): at 21:12

## 2017-08-11 RX ADMIN — SODIUM CHLORIDE 3 MILLILITER(S): 9 INJECTION INTRAMUSCULAR; INTRAVENOUS; SUBCUTANEOUS at 21:07

## 2017-08-11 RX ADMIN — Medication 100 MILLIGRAM(S): at 18:49

## 2017-08-11 RX ADMIN — Medication 40 MILLIGRAM(S): at 05:12

## 2017-08-11 RX ADMIN — METFORMIN HYDROCHLORIDE 850 MILLIGRAM(S): 850 TABLET ORAL at 08:55

## 2017-08-11 RX ADMIN — PANTOPRAZOLE SODIUM 40 MILLIGRAM(S): 20 TABLET, DELAYED RELEASE ORAL at 05:12

## 2017-08-11 RX ADMIN — Medication 100 MILLIGRAM(S): at 05:12

## 2017-08-11 RX ADMIN — METFORMIN HYDROCHLORIDE 850 MILLIGRAM(S): 850 TABLET ORAL at 18:49

## 2017-08-11 RX ADMIN — SODIUM CHLORIDE 3 MILLILITER(S): 9 INJECTION INTRAMUSCULAR; INTRAVENOUS; SUBCUTANEOUS at 13:33

## 2017-08-11 RX ADMIN — Medication 100 MILLIGRAM(S): at 12:43

## 2017-08-11 RX ADMIN — Medication 2: at 12:43

## 2017-08-11 RX ADMIN — Medication 81 MILLIGRAM(S): at 12:43

## 2017-08-11 RX ADMIN — ATORVASTATIN CALCIUM 40 MILLIGRAM(S): 80 TABLET, FILM COATED ORAL at 21:12

## 2017-08-11 RX ADMIN — ENOXAPARIN SODIUM 40 MILLIGRAM(S): 100 INJECTION SUBCUTANEOUS at 21:12

## 2017-08-11 NOTE — DISCHARGE NOTE ADULT - NS AS ACTIVITY OBS
Showering allowed/Walking-Outdoors allowed/Stairs allowed/Walking-Indoors allowed/No Heavy lifting/straining/Do not make important decisions/Do not drive or operate machinery

## 2017-08-11 NOTE — DISCHARGE NOTE ADULT - CARE PROVIDER_API CALL
Fede Watters), Surgery; Thoracic and Cardiac Surgery  [address] [place] [address]  Phone: [phone]  Fax: [phone]    Nick Aldana (MD), Cardiovascular Disease  [address] [place] [address]  Phone: [phone]  Fax: [phone]    Hirschberg, Robert (), Internal Medicine  [address] [place] [address]  Phone: [phone]  Fax: [phone]

## 2017-08-11 NOTE — DISCHARGE NOTE ADULT - NSFTFSERV1RD_GEN_ALL_CORE
teaching and training/medication teaching and assessment/wound care and assessment/observation and assessment

## 2017-08-11 NOTE — DISCHARGE NOTE ADULT - PLAN OF CARE
To recover from surgery Upon discharge from rehab, make a follow up appointment with Dr Watters by calling [phone].  Follow up with your cardiologist and primary care doctor within 7-10 days after discharge. Sternal wound precautions, Blood glucose fingerstick checks qAC/HS, daily weights, DASH diet, ensure supplements bid, daily shower,  PT/OT Rehab  per rehab facility. BMP, CBC twice a week. CXR weekly. Vitals per routine. Continue Coumadin with daily INR. Follow up appointment with Dr Watters on   Cardiac surgery office second floor at [facility]   The cardiac surgery office is on the second floor of [facility].   Take the [place] ("G") elevators to 2 and make a left.   Walk down to the second hallway on your left (before the double doors).   Sign says Cardiovascular and Thoracic Surgery.  The waiting room is on your left.   Shower daily, no bathing swimming in a pool or the ocean until instructed by MD.    We advise that you do not drive until instructed by MD.   You may not return to work until instructed by MD.   DO NOT APPLY CREAM POWDER LOTION OR OINTMENT TO ANY SURGICAL WOUND>THIS CAN IMPEDE HEALING AND CAUSE AN INFECTION    Please eat a low fat low salt diet.   Please come to ED or Call Cardio thoracic office at [phone] if Chest pain, Shortness of Breath, persistent Nausea & vomiting, oozing from wounds, palpitations or pain not relieved with medication  Weigh yourself daily>notify surgeons office if  2 lb increase in weight in 24 hours.   Wash incisions with soap and water using washcloth in shower daily Continue Coumadin with daily INR.  Please have your PT/INR levels checked on Mondays and Thursdays.  The lab will come to your home to draw your blood.  The results will be faxed to your doctors office (Dr. Hirschberg).  Please call Dr. Hirschberg's office for Coumadin dosing. Follow up appointment with Dr Watters on  9/5/17 at 1:15pm  Cardiac surgery office second floor at [facility]   The cardiac surgery office is on the second floor of [facility].   Take the [place] ("G") elevators to 2 and make a left.   Walk down to the second hallway on your left (before the double doors).   Sign says Cardiovascular and Thoracic Surgery.  The waiting room is on your left.   Shower daily, no bathing swimming in a pool or the ocean until instructed by MD.    We advise that you do not drive until instructed by MD.   You may not return to work until instructed by MD.   DO NOT APPLY CREAM POWDER LOTION OR OINTMENT TO ANY SURGICAL WOUND>THIS CAN IMPEDE HEALING AND CAUSE AN INFECTION    Please eat a low fat low salt diet.   Please come to ED or Call Cardio thoracic office at [phone] if Chest pain, Shortness of Breath, persistent Nausea & vomiting, oozing from wounds, palpitations or pain not relieved with medication  Weigh yourself daily>notify surgeons office if  2 lb increase in weight in 24 hours.   Wash incisions with soap and water using washcloth in shower daily

## 2017-08-11 NOTE — PROGRESS NOTE ADULT - PROBLEM SELECTOR PLAN 2
S/P FEDERICO clip.  Coumadin daily per INR.  Continue Digoxin. S/P FEDERICO clip.  Coumadin daily per INR.  F/U TTE today to r/o pericardial effusion now that INR is therapeutic.  Repeat INR at 4pm today to determine tonights Coumadin dose.  Continue Digoxin.

## 2017-08-11 NOTE — DISCHARGE NOTE ADULT - MEDICATION SUMMARY - MEDICATIONS TO STOP TAKING
I will STOP taking the medications listed below when I get home from the hospital:    Cardizem 240 mg/24 hours oral capsule, extended release  -- 1 cap(s) by mouth once a day    losartan 50 mg oral tablet  -- 1 tab(s) by mouth once a day    Glucophage 1000 mg oral tablet  -- 1 tab(s) by mouth 2 times a day    Zocor 10 mg oral tablet  -- 1 tab(s) by mouth once a day (at bedtime)    glipiZIDE 10 mg oral tablet  --  by mouth 2 times a day    Xarelto 20 mg oral tablet  -- 1 tab(s) by mouth once a day (in the evening)  -- Check with your doctor before becoming pregnant.  It is very important that you take or use this exactly as directed.  Do not skip doses or discontinue unless directed by your doctor.  Obtain medical advice before taking any non-prescription drugs as some may affect the action of this medication.  Take with food.

## 2017-08-11 NOTE — DISCHARGE NOTE ADULT - HOSPITAL COURSE
85 year old male with PMH Afib, CAD with prior cardiac stent (2011), s/p PPM (BS settings DDD 61/10)), HLD, DM, and TIA; originally presented to cardiologist c/o dyspnea.   Work up requiring nuclear perfusion study was positive for moderate ischemia of moderated sized territory of the anterior wall.     8/2 s/p CABG x 3 and left atrial appendage clip.     Post-op course complicated by hypoxemic acute respiratory failure requiring HFNC O2. Dobutamine intially used post-op for mild RV failure and pressors for post-op vasoplegia all since resolved.  8/7: Patient with continued O2 requirements overnight of 4-5L. Lasix 40mg x 1 given.  Lopressor increased to 100mg BID.  8/8  +Cough.  Suspected Tracheobronchitis.  Zithromax and Tessalon started. > improved symptoms

## 2017-08-11 NOTE — PROGRESS NOTE ADULT - SUBJECTIVE AND OBJECTIVE BOX
Subjective: "I'm feeling OK.  Am I going to rehab today?"  Sitting up in bed. Denies SOB or CP.  NAD noted.      Tele: Vpaced.  Underlying Afib.                         T(F): 98 (17 @ 10:34), Max: 98.1 (08-10-17 @ 22:31)  HR: 89 (17 @ 10:34) (75 - 95)  BP: 105/55 (17 @ 10:34) (102/58 - 126/70)  RR: 16 (17 @ 10:34) (16 - 16)  SpO2: 95% (17 @ 10:34) (95% - 99%) on 4 liters nasal O2.          Daily     Daily Weight in k.4 (11 Aug 2017 06:11)    LV EF: nml    Allergies:  epinephrine (Other)        140  |  95<L>  |  27.0<H>  ----------------------------<  142<H>  4.5   |  32.0<H>  |  0.87    Ca    8.6      11 Aug 2017 08:56  Phos  3.2     08-10  Mg     1.7     08-10    TPro  6.3<L>  /  Alb  3.6  /  TBili  1.2  /  DBili  x   /  AST  23  /  ALT  16  /  AlkPhos  73  -11                               11.5   9.0   )-----------( 252      ( 11 Aug 2017 08:56 )             35.6        PT/INR - ( 11 Aug 2017 08:56 )   PT: 23.0 sec;   INR: 2.06 ratio                CAPILLARY BLOOD GLUCOSE  107 (11 Aug 2017 08:00)  183 (10 Aug 2017 20:28)  123 (10 Aug 2017 18:11)  68 (10 Aug 2017 17:46)  164 (10 Aug 2017 11:31)               CXR: NA    I&O's Detail    10 Aug 2017 07:01  -  11 Aug 2017 07:00  --------------------------------------------------------  IN:    Oral Fluid: 720 mL    Solution: 50 mL  Total IN: 770 mL    OUT:    Voided: 1375 mL  Total OUT: 1375 mL    Total NET: -605 mL          CHEST TUBE:  [ ] YES [ x] NO  OUTPUT:     per 24 hours    AIR LEAKS:  [ ] YES [ ] NO      NORM DRAIN:   [ ] YES [x ] NO  OUTPUT:     per 24 hours    EPICARDIAL WIRES:  [ ] YES [x ] NO      BOWEL MOVEMENT:  [x ] YES [ ] NO        Active Medications:  docusate sodium 100 milliGRAM(s) Oral three times a day  dextrose 50% Injectable 50 milliLiter(s) IV Push every 15 minutes  dextrose 50% Injectable 25 milliLiter(s) IV Push every 15 minutes  enoxaparin Injectable 40 milliGRAM(s) SubCutaneous daily  digoxin     Tablet 0.125 milliGRAM(s) Oral daily  sodium chloride 0.9% lock flush 3 milliLiter(s) IV Push every 8 hours  pantoprazole    Tablet 40 milliGRAM(s) Oral before breakfast  aspirin enteric coated 81 milliGRAM(s) Oral daily  atorvastatin 40 milliGRAM(s) Oral at bedtime  metoprolol 100 milliGRAM(s) Oral two times a day  benzonatate 100 milliGRAM(s) Oral three times a day PRN  azithromycin   Tablet 250 milliGRAM(s) Oral daily  oxyCODONE    5 mG/acetaminophen 325 mG 1 Tablet(s) Oral every 6 hours PRN  furosemide    Tablet 40 milliGRAM(s) Oral daily  metFORMIN 850 milliGRAM(s) Oral two times a day with meals  insulin lispro (HumaLOG) corrective regimen sliding scale   SubCutaneous three times a day before meals      Physical Exam:    Neuro: AAOX3.  No focal deficits.    Pulm: Diminished BLL.    CV: Irregular.  +S1+S2.    Abd: Soft/NT/ND.  +BS.  +BM 8/10.    Extremities: +1 BLE edema.  +pp.      Incision(s): MSI LUCY and without erythema or drainage.  Sternum stable. RLE EVH site LUCY and without erythema or drainage.  + thigh eccymosis.                      PAST MEDICAL & SURGICAL HISTORY:  Stroke syndrome  CAD (coronary artery disease)  LEON (dyspnea on exertion)  Myocardial ischemia  Fatigue  Hypertension  Stented coronary artery: ELLIE x 1  () -- [facility]  Arthritis  Trigger finger  Pacemaker  HTN (hypertension)  Diabetes mellitus  Afib  Status post trigger finger release: Multiple in the past  S/P angioplasty with stent  Pacemaker:

## 2017-08-11 NOTE — DISCHARGE NOTE ADULT - PATIENT PORTAL LINK FT
“You can access the Follow[facility] Patient Portal, offered by [facility], by registering with the following website: [email or web address]”

## 2017-08-11 NOTE — DISCHARGE NOTE ADULT - CARE PLAN
Principal Discharge DX:	Coronary artery disease involving native coronary artery of native heart without angina pectoris  Goal:	To recover from surgery  Instructions for follow-up, activity and diet:	Upon discharge from rehab, make a follow up appointment with Dr Watters by calling [phone].  Follow up with your cardiologist and primary care doctor within 7-10 days after discharge. Sternal wound precautions, Blood glucose fingerstick checks qAC/HS, daily weights, DASH diet, ensure supplements bid, daily shower,  PT/OT Rehab  per rehab facility. BMP, CBC twice a week. CXR weekly. Vitals per routine.  Secondary Diagnosis:	Chronic atrial fibrillation  Instructions for follow-up, activity and diet:	Continue Coumadin with daily INR. Principal Discharge DX:	Coronary artery disease involving native coronary artery of native heart without angina pectoris  Goal:	To recover from surgery  Instructions for follow-up, activity and diet:	Follow up appointment with Dr Watters on   Cardiac surgery office second floor at [facility]   The cardiac surgery office is on the second floor of [facility].   Take the [place] ("G") elevators to 2 and make a left.   Walk down to the second hallway on your left (before the double doors).   Sign says Cardiovascular and Thoracic Surgery.  The waiting room is on your left.   Shower daily, no bathing swimming in a pool or the ocean until instructed by MD.    We advise that you do not drive until instructed by MD.   You may not return to work until instructed by MD.   DO NOT APPLY CREAM POWDER LOTION OR OINTMENT TO ANY SURGICAL WOUND>THIS CAN IMPEDE HEALING AND CAUSE AN INFECTION    Please eat a low fat low salt diet.   Please come to ED or Call Cardio thoracic office at [phone] if Chest pain, Shortness of Breath, persistent Nausea & vomiting, oozing from wounds, palpitations or pain not relieved with medication  Weigh yourself daily>notify surgeons office if  2 lb increase in weight in 24 hours.   Wash incisions with soap and water using washcloth in shower daily  Secondary Diagnosis:	Chronic atrial fibrillation  Instructions for follow-up, activity and diet:	Continue Coumadin with daily INR.  Please have your PT/INR levels checked on Mondays and Thursdays.  The lab will come to your home to draw your blood.  The results will be faxed to your doctors office (Dr. Hirschberg).  Please call Dr. Hirschberg's office for Coumadin dosing. Principal Discharge DX:	Coronary artery disease involving native coronary artery of native heart without angina pectoris  Goal:	To recover from surgery  Instructions for follow-up, activity and diet:	Follow up appointment with Dr Watters on   Cardiac surgery office second floor at [facility]   The cardiac surgery office is on the second floor of [facility].   Take the [place] ("G") elevators to 2 and make a left.   Walk down to the second hallway on your left (before the double doors).   Sign says Cardiovascular and Thoracic Surgery.  The waiting room is on your left.   Shower daily, no bathing swimming in a pool or the ocean until instructed by MD.    We advise that you do not drive until instructed by MD.   You may not return to work until instructed by MD.   DO NOT APPLY CREAM POWDER LOTION OR OINTMENT TO ANY SURGICAL WOUND>THIS CAN IMPEDE HEALING AND CAUSE AN INFECTION    Please eat a low fat low salt diet.   Please come to ED or Call Cardio thoracic office at [phone] if Chest pain, Shortness of Breath, persistent Nausea & vomiting, oozing from wounds, palpitations or pain not relieved with medication  Weigh yourself daily>notify surgeons office if  2 lb increase in weight in 24 hours.   Wash incisions with soap and water using washcloth in shower daily  Secondary Diagnosis:	Chronic atrial fibrillation  Instructions for follow-up, activity and diet:	Continue Coumadin with daily INR.  Please have your PT/INR levels checked on Mondays and Thursdays.  The lab will come to your home to draw your blood.  The results will be faxed to your doctors office (Dr. Hirschberg).  Please call Dr. Hirschberg's office for Coumadin dosing. Principal Discharge DX:	Coronary artery disease involving native coronary artery of native heart without angina pectoris  Goal:	To recover from surgery  Instructions for follow-up, activity and diet:	Follow up appointment with Dr Watters on  9/5/17 at 1:15pm  Cardiac surgery office second floor at [facility]   The cardiac surgery office is on the second floor of [facility].   Take the [place] ("G") elevators to 2 and make a left.   Walk down to the second hallway on your left (before the double doors).   Sign says Cardiovascular and Thoracic Surgery.  The waiting room is on your left.   Shower daily, no bathing swimming in a pool or the ocean until instructed by MD.    We advise that you do not drive until instructed by MD.   You may not return to work until instructed by MD.   DO NOT APPLY CREAM POWDER LOTION OR OINTMENT TO ANY SURGICAL WOUND>THIS CAN IMPEDE HEALING AND CAUSE AN INFECTION    Please eat a low fat low salt diet.   Please come to ED or Call Cardio thoracic office at [phone] if Chest pain, Shortness of Breath, persistent Nausea & vomiting, oozing from wounds, palpitations or pain not relieved with medication  Weigh yourself daily>notify surgeons office if  2 lb increase in weight in 24 hours.   Wash incisions with soap and water using washcloth in shower daily  Secondary Diagnosis:	Chronic atrial fibrillation  Instructions for follow-up, activity and diet:	Continue Coumadin with daily INR.  Please have your PT/INR levels checked on Mondays and Thursdays.  The lab will come to your home to draw your blood.  The results will be faxed to your doctors office (Dr. Hirschberg).  Please call Dr. Hirschberg's office for Coumadin dosing. Principal Discharge DX:	Coronary artery disease involving native coronary artery of native heart without angina pectoris  Goal:	To recover from surgery  Instructions for follow-up, activity and diet:	Follow up appointment with Dr Watters on  9/5/17 at 1:15pm  Cardiac surgery office second floor at [facility]   The cardiac surgery office is on the second floor of [facility].   Take the [place] ("G") elevators to 2 and make a left.   Walk down to the second hallway on your left (before the double doors).   Sign says Cardiovascular and Thoracic Surgery.  The waiting room is on your left.   Shower daily, no bathing swimming in a pool or the ocean until instructed by MD.    We advise that you do not drive until instructed by MD.   You may not return to work until instructed by MD.   DO NOT APPLY CREAM POWDER LOTION OR OINTMENT TO ANY SURGICAL WOUND>THIS CAN IMPEDE HEALING AND CAUSE AN INFECTION    Please eat a low fat low salt diet.   Please come to ED or Call Cardio thoracic office at [phone] if Chest pain, Shortness of Breath, persistent Nausea & vomiting, oozing from wounds, palpitations or pain not relieved with medication  Weigh yourself daily>notify surgeons office if  2 lb increase in weight in 24 hours.   Wash incisions with soap and water using washcloth in shower daily  Secondary Diagnosis:	Chronic atrial fibrillation  Instructions for follow-up, activity and diet:	Continue Coumadin with daily INR.  Please have your PT/INR levels checked on Mondays and Thursdays.  The lab will come to your home to draw your blood.  The results will be faxed to your doctors office (Dr. Hirschberg).  Please call Dr. Hirschberg's office for Coumadin dosing. Principal Discharge DX:	Coronary artery disease involving native coronary artery of native heart without angina pectoris  Goal:	To recover from surgery  Instructions for follow-up, activity and diet:	Follow up appointment with Dr Watters on  9/5/17 at 1:15pm  Cardiac surgery office second floor at [facility]   The cardiac surgery office is on the second floor of [facility].   Take the [place] ("G") elevators to 2 and make a left.   Walk down to the second hallway on your left (before the double doors).   Sign says Cardiovascular and Thoracic Surgery.  The waiting room is on your left.   Shower daily, no bathing swimming in a pool or the ocean until instructed by MD.    We advise that you do not drive until instructed by MD.   You may not return to work until instructed by MD.   DO NOT APPLY CREAM POWDER LOTION OR OINTMENT TO ANY SURGICAL WOUND>THIS CAN IMPEDE HEALING AND CAUSE AN INFECTION    Please eat a low fat low salt diet.   Please come to ED or Call Cardio thoracic office at [phone] if Chest pain, Shortness of Breath, persistent Nausea & vomiting, oozing from wounds, palpitations or pain not relieved with medication  Weigh yourself daily>notify surgeons office if  2 lb increase in weight in 24 hours.   Wash incisions with soap and water using washcloth in shower daily  Secondary Diagnosis:	Chronic atrial fibrillation  Instructions for follow-up, activity and diet:	Continue Coumadin with daily INR.  Please have your PT/INR levels checked on Mondays and Thursdays.  The lab will come to your home to draw your blood.  The results will be faxed to your doctors office (Dr. Hirschberg).  Please call Dr. Hirschberg's office for Coumadin dosing. Principal Discharge DX:	Coronary artery disease involving native coronary artery of native heart without angina pectoris  Goal:	To recover from surgery  Instructions for follow-up, activity and diet:	Follow up appointment with Dr Watters on  9/5/17 at 1:15pm  Cardiac surgery office second floor at [facility]   The cardiac surgery office is on the second floor of [facility].   Take the [place] ("G") elevators to 2 and make a left.   Walk down to the second hallway on your left (before the double doors).   Sign says Cardiovascular and Thoracic Surgery.  The waiting room is on your left.   Shower daily, no bathing swimming in a pool or the ocean until instructed by MD.    We advise that you do not drive until instructed by MD.   You may not return to work until instructed by MD.   DO NOT APPLY CREAM POWDER LOTION OR OINTMENT TO ANY SURGICAL WOUND>THIS CAN IMPEDE HEALING AND CAUSE AN INFECTION    Please eat a low fat low salt diet.   Please come to ED or Call Cardio thoracic office at [phone] if Chest pain, Shortness of Breath, persistent Nausea & vomiting, oozing from wounds, palpitations or pain not relieved with medication  Weigh yourself daily>notify surgeons office if  2 lb increase in weight in 24 hours.   Wash incisions with soap and water using washcloth in shower daily  Secondary Diagnosis:	Chronic atrial fibrillation  Instructions for follow-up, activity and diet:	Continue Coumadin with daily INR.  Please have your PT/INR levels checked on Mondays and Thursdays.  The lab will come to your home to draw your blood.  The results will be faxed to your doctors office (Dr. Hirschberg).  Please call Dr. Hirschberg's office for Coumadin dosing.

## 2017-08-11 NOTE — DISCHARGE NOTE ADULT - MEDICATION SUMMARY - MEDICATIONS TO TAKE
I will START or STAY ON the medications listed below when I get home from the hospital:    DME  -- 3 in 1 commode  -- Indication: For Commode    DME  -- Oxygen homefill and concentrator with refill system.    O2 sat 82% on room air with ambulation.             96% on 2 liters into nose O2 with ambulation.          Length of need:  99 months  -- Indication: For Oxygen     aspirin 81 mg oral delayed release tablet  -- 1 tab(s) by mouth once a day  -- Indication: For Antiplatelet    oxycodone-acetaminophen 5mg-325mg oral tablet  -- 1 tab(s) by mouth every 4 hours MDD:4 tabs  -- Caution federal law prohibits the transfer of this drug to any person other  than the person for whom it was prescribed.  May cause drowsiness.  Alcohol may intensify this effect.  Use care when operating dangerous machinery.  This prescription cannot be refilled.  This product contains acetaminophen.  Do not use  with any other product containing acetaminophen to prevent possible liver damage.  Using more of this medication than prescribed may cause serious breathing problems.    -- Indication: For Pain    digoxin 125 mcg (0.125 mg) oral tablet  -- 1 tab(s) by mouth once a day  -- Indication: For Afib    warfarin 5 mg oral tablet  -- 1 tab(s) by mouth once a day (at bedtime)    Home blood draws Monday/Thursday.  Please call Dr. Duarte for coumadin dosing.  -- Do not take this drug if you are pregnant.  It is very important that you take or use this exactly as directed.  Do not skip doses or discontinue unless directed by your doctor.  Obtain medical advice before taking any non-prescription drugs as some may affect the action of this medication.    -- Indication: For Anticoagulation fo afib    metFORMIN 850 mg oral tablet  -- 1 tab(s) by mouth 2 times a day (with meals)  -- Indication: For diabetes    atorvastatin 40 mg oral tablet  -- 1 tab(s) by mouth once a day (at bedtime)  -- Indication: For Hyperlipidemia    benzonatate 100 mg oral capsule  -- 1 cap(s) by mouth 3 times a day, As needed, Cough  -- Indication: For Cough    metoprolol tartrate 100 mg oral tablet  -- 1 tab(s) by mouth 2 times a day  -- Indication: For Hypertension    furosemide 40 mg oral tablet  -- 1 tab(s) by mouth once a day  -- Indication: For diuretic    docusate sodium 100 mg oral capsule  -- 1 cap(s) by mouth 3 times a day  -- Indication: For Stool softner    magnesium oxide 400 mg (241.3 mg elemental magnesium) oral tablet  -- 1 tab(s) by mouth once a day (before a meal)  -- Indication: For Supplement    pantoprazole 40 mg oral delayed release tablet  -- 1 tab(s) by mouth once a day (before a meal)  -- Indication: For GERD I will START or STAY ON the medications listed below when I get home from the hospital:    DME  -- 3 in 1 commode  -- Indication: For Commode    DME  -- Oxygen homefill and concentrator with refill system.    O2 sat 82% on room air with ambulation.             96% on 2 liters into nose O2 with ambulation.          Length of need:  99 months  -- Indication: For Oxygen     DME  -- Rolling walker   -- Indication: For walker    oxycodone-acetaminophen 5mg-325mg oral tablet  -- 1 tab(s) by mouth every 4 hours MDD:4 tabs  -- Caution federal law prohibits the transfer of this drug to any person other  than the person for whom it was prescribed.  May cause drowsiness.  Alcohol may intensify this effect.  Use care when operating dangerous machinery.  This prescription cannot be refilled.  This product contains acetaminophen.  Do not use  with any other product containing acetaminophen to prevent possible liver damage.  Using more of this medication than prescribed may cause serious breathing problems.    -- Indication: For Pain    aspirin 81 mg oral delayed release tablet  -- 1 tab(s) by mouth once a day  -- Indication: For Antiplatelet    digoxin 125 mcg (0.125 mg) oral tablet  -- 1 tab(s) by mouth once a day  -- Indication: For Afib    warfarin 5 mg oral tablet  -- 1 tab(s) by mouth once a day (at bedtime)    Home blood draws Monday/Thursday.  Please call Dr. Duarte for coumadin dosing.  -- Do not take this drug if you are pregnant.  It is very important that you take or use this exactly as directed.  Do not skip doses or discontinue unless directed by your doctor.  Obtain medical advice before taking any non-prescription drugs as some may affect the action of this medication.    -- Indication: For Anticoagulation fo afib    metFORMIN 850 mg oral tablet  -- 1 tab(s) by mouth 2 times a day (with meals)  -- Indication: For diabetes    atorvastatin 40 mg oral tablet  -- 1 tab(s) by mouth once a day (at bedtime)  -- Indication: For Hyperlipidemia    benzonatate 100 mg oral capsule  -- 1 cap(s) by mouth 3 times a day, As needed, Cough  -- Indication: For Cough    metoprolol tartrate 100 mg oral tablet  -- 1 tab(s) by mouth 2 times a day  -- Indication: For Hypertension    furosemide 40 mg oral tablet  -- 1 tab(s) by mouth once a day  -- Indication: For diuretic    docusate sodium 100 mg oral capsule  -- 1 cap(s) by mouth 3 times a day  -- Indication: For Stool softner    magnesium oxide 400 mg (241.3 mg elemental magnesium) oral tablet  -- 1 tab(s) by mouth once a day (before a meal)  -- Indication: For Supplement    pantoprazole 40 mg oral delayed release tablet  -- 1 tab(s) by mouth once a day (before a meal)  -- Indication: For GERD

## 2017-08-11 NOTE — DISCHARGE NOTE ADULT - INSTRUCTIONS
A registered dietician can help you create a personalized plan for healthy eating. Make your calories count by choosin. Healthy carbohydrates such as fruits, vegetables, whole grains, legumes (beans, peas and lentils) and low-fat dairy products.  2. Fiber-rich foods such as vegetables, fruits, nuts, legumes, whole-wheat flour and wheat bran.  3. Heart-healthy fish at least twice a week such as cod, tuna and halibut, which are low-fat options as well as salmon, mackerel, tuna, sardines and bluefish, which are rich in omega-3 fatty acids. Avoid fish with high levels of mercury.  4. "Good" fats such as avocados, almonds, pecans, walnuts, olives and canola, olive and peanut oils.     Minimize foods with high saturated fats (beef, hot dogs, sausage and parker), trans fats (processed snacks, baked goods, shortening and stick margarines), high cholesterol foods (high fat dairy products and animal proteins, fried food) and high sodium foods (fast food, many frozen foods, processed food).

## 2017-08-11 NOTE — DISCHARGE NOTE ADULT - CARE PROVIDERS DIRECT ADDRESSES
[email or web address],DirectAddress_Unknown

## 2017-08-12 LAB
ALBUMIN SERPL ELPH-MCNC: 3.5 G/DL — SIGNIFICANT CHANGE UP (ref 3.3–5.2)
ALP SERPL-CCNC: 68 U/L — SIGNIFICANT CHANGE UP (ref 40–120)
ALT FLD-CCNC: 15 U/L — SIGNIFICANT CHANGE UP
ANION GAP SERPL CALC-SCNC: 13 MMOL/L — SIGNIFICANT CHANGE UP (ref 5–17)
AST SERPL-CCNC: 19 U/L — SIGNIFICANT CHANGE UP
BILIRUB SERPL-MCNC: 1 MG/DL — SIGNIFICANT CHANGE UP (ref 0.4–2)
BUN SERPL-MCNC: 28 MG/DL — HIGH (ref 8–20)
CALCIUM SERPL-MCNC: 8.4 MG/DL — LOW (ref 8.6–10.2)
CHLORIDE SERPL-SCNC: 98 MMOL/L — SIGNIFICANT CHANGE UP (ref 98–107)
CO2 SERPL-SCNC: 31 MMOL/L — HIGH (ref 22–29)
CREAT SERPL-MCNC: 0.89 MG/DL — SIGNIFICANT CHANGE UP (ref 0.5–1.3)
GLUCOSE SERPL-MCNC: 132 MG/DL — HIGH (ref 70–115)
HCT VFR BLD CALC: 34.9 % — LOW (ref 42–52)
HGB BLD-MCNC: 11.2 G/DL — LOW (ref 14–18)
INR BLD: 2.46 RATIO — HIGH (ref 0.88–1.16)
MAGNESIUM SERPL-MCNC: 1.5 MG/DL — LOW (ref 1.6–2.6)
MCHC RBC-ENTMCNC: 30.4 PG — SIGNIFICANT CHANGE UP (ref 27–31)
MCHC RBC-ENTMCNC: 32.1 G/DL — SIGNIFICANT CHANGE UP (ref 32–36)
MCV RBC AUTO: 94.8 FL — HIGH (ref 80–94)
PHOSPHATE SERPL-MCNC: 3.3 MG/DL — SIGNIFICANT CHANGE UP (ref 2.4–4.7)
PLATELET # BLD AUTO: 232 K/UL — SIGNIFICANT CHANGE UP (ref 150–400)
POTASSIUM SERPL-MCNC: 4.4 MMOL/L — SIGNIFICANT CHANGE UP (ref 3.5–5.3)
POTASSIUM SERPL-SCNC: 4.4 MMOL/L — SIGNIFICANT CHANGE UP (ref 3.5–5.3)
PROT SERPL-MCNC: 5.9 G/DL — LOW (ref 6.6–8.7)
PROTHROM AB SERPL-ACNC: 27.6 SEC — HIGH (ref 9.8–12.7)
RBC # BLD: 3.68 M/UL — LOW (ref 4.6–6.2)
RBC # FLD: 14.6 % — SIGNIFICANT CHANGE UP (ref 11–15.6)
SODIUM SERPL-SCNC: 142 MMOL/L — SIGNIFICANT CHANGE UP (ref 135–145)
WBC # BLD: 7.9 K/UL — SIGNIFICANT CHANGE UP (ref 4.8–10.8)
WBC # FLD AUTO: 7.9 K/UL — SIGNIFICANT CHANGE UP (ref 4.8–10.8)

## 2017-08-12 PROCEDURE — 71010: CPT | Mod: 26

## 2017-08-12 RX ORDER — WARFARIN SODIUM 2.5 MG/1
5 TABLET ORAL ONCE
Qty: 0 | Refills: 0 | Status: COMPLETED | OUTPATIENT
Start: 2017-08-12 | End: 2017-08-12

## 2017-08-12 RX ORDER — MAGNESIUM SULFATE 500 MG/ML
2 VIAL (ML) INJECTION ONCE
Qty: 0 | Refills: 0 | Status: COMPLETED | OUTPATIENT
Start: 2017-08-12 | End: 2017-08-12

## 2017-08-12 RX ADMIN — AZITHROMYCIN 250 MILLIGRAM(S): 500 TABLET, FILM COATED ORAL at 12:23

## 2017-08-12 RX ADMIN — SODIUM CHLORIDE 3 MILLILITER(S): 9 INJECTION INTRAMUSCULAR; INTRAVENOUS; SUBCUTANEOUS at 12:25

## 2017-08-12 RX ADMIN — SODIUM CHLORIDE 3 MILLILITER(S): 9 INJECTION INTRAMUSCULAR; INTRAVENOUS; SUBCUTANEOUS at 21:12

## 2017-08-12 RX ADMIN — Medication 40 MILLIGRAM(S): at 06:42

## 2017-08-12 RX ADMIN — Medication 100 MILLIGRAM(S): at 12:24

## 2017-08-12 RX ADMIN — Medication 50 GRAM(S): at 06:43

## 2017-08-12 RX ADMIN — Medication 100 MILLIGRAM(S): at 21:23

## 2017-08-12 RX ADMIN — METFORMIN HYDROCHLORIDE 850 MILLIGRAM(S): 850 TABLET ORAL at 17:44

## 2017-08-12 RX ADMIN — PANTOPRAZOLE SODIUM 40 MILLIGRAM(S): 20 TABLET, DELAYED RELEASE ORAL at 06:42

## 2017-08-12 RX ADMIN — Medication 100 MILLIGRAM(S): at 17:44

## 2017-08-12 RX ADMIN — Medication 0.12 MILLIGRAM(S): at 06:42

## 2017-08-12 RX ADMIN — SODIUM CHLORIDE 3 MILLILITER(S): 9 INJECTION INTRAMUSCULAR; INTRAVENOUS; SUBCUTANEOUS at 06:50

## 2017-08-12 RX ADMIN — Medication 100 MILLIGRAM(S): at 06:42

## 2017-08-12 RX ADMIN — WARFARIN SODIUM 5 MILLIGRAM(S): 2.5 TABLET ORAL at 21:23

## 2017-08-12 RX ADMIN — ENOXAPARIN SODIUM 40 MILLIGRAM(S): 100 INJECTION SUBCUTANEOUS at 21:23

## 2017-08-12 RX ADMIN — METFORMIN HYDROCHLORIDE 850 MILLIGRAM(S): 850 TABLET ORAL at 09:05

## 2017-08-12 RX ADMIN — Medication 1: at 12:45

## 2017-08-12 RX ADMIN — Medication 81 MILLIGRAM(S): at 12:23

## 2017-08-12 RX ADMIN — ATORVASTATIN CALCIUM 40 MILLIGRAM(S): 80 TABLET, FILM COATED ORAL at 21:23

## 2017-08-12 NOTE — PROGRESS NOTE ADULT - SUBJECTIVE AND OBJECTIVE BOX
Subjective: "I feel a little more tired today." Pt seated in the chair, NAD.     VITAL SIGNS  Vital Signs Last 24 Hrs  T(C): 36.7 (17 @ 10:45), Max: 36.7 (17 @ 10:45)  T(F): 98 (17 @ 10:45), Max: 98 (17 @ 10:45)  HR: 88 (17 @ 10:45) (87 - 124)  BP: 98/60 (17 @ 10:45) (98/60 - 118/78)  RR: 19 (17 @ 12:04) (16 - 19)  SpO2: 97% (17 @ 12:04) (93% - 97%)  on 3L NC             Telemetry/Alarms:  AF/  LVEF: nl    MEDICATIONS  docusate sodium 100 milliGRAM(s) Oral three times a day  dextrose 50% Injectable 50 milliLiter(s) IV Push every 15 minutes  dextrose 50% Injectable 25 milliLiter(s) IV Push every 15 minutes  enoxaparin Injectable 40 milliGRAM(s) SubCutaneous daily  digoxin     Tablet 0.125 milliGRAM(s) Oral daily  sodium chloride 0.9% lock flush 3 milliLiter(s) IV Push every 8 hours  pantoprazole    Tablet 40 milliGRAM(s) Oral before breakfast  aspirin enteric coated 81 milliGRAM(s) Oral daily  atorvastatin 40 milliGRAM(s) Oral at bedtime  metoprolol 100 milliGRAM(s) Oral two times a day  benzonatate 100 milliGRAM(s) Oral three times a day PRN  oxyCODONE    5 mG/acetaminophen 325 mG 1 Tablet(s) Oral every 6 hours PRN  furosemide    Tablet 40 milliGRAM(s) Oral daily  metFORMIN 850 milliGRAM(s) Oral two times a day with meals  insulin lispro (HumaLOG) corrective regimen sliding scale   SubCutaneous three times a day before meals  warfarin 5 milliGRAM(s) Oral once      PHYSICAL EXAM  General: well nourished, well developed, no acute distress  Neurology: alert and oriented x 3, nonfocal, no gross deficits  Respiratory: clear to auscultation bilaterally  CV: regular rate and rhythm, normal S1, S2  Abdomen: soft, nontender, nondistended, positive bowel sounds,   Extremities: warm, well perfused. no edema. + DP pulses  Incisions: midline sternal incision, c/d/i. sternum stable. RLE EVH inc c/d/i      08 @ 07:  -   @ 07:00  --------------------------------------------------------  IN: 840 mL / OUT: 1150 mL / NET: -310 mL     @ 07:  -   @ 16:51  --------------------------------------------------------  IN: 0 mL / OUT: 570 mL / NET: -570 mL        Weights:  Daily     Daily Weight in k.8 (12 Aug 2017 04:00)  Admit Wt: Drug Dosing Weight  Height (cm): 160.02 (02 Aug 2017 08:02)  Weight (kg): 68 (06 Aug 2017 05:18)  BMI (kg/m2): 26.6 (06 Aug 2017 05:18)  BSA (m2): 1.71 (06 Aug 2017 05:18)    LABS      142  |  98  |  28.0<H>  ----------------------------<  132<H>  4.4   |  31.0<H>  |  0.89    Ca    8.4<L>      12 Aug 2017 05:18  Phos  3.3       Mg     1.5         TPro  5.9<L>  /  Alb  3.5  /  TBili  1.0  /  DBili  x   /  AST  19  /  ALT  15  /  AlkPhos  68                                   11.2   7.9   )-----------( 232      ( 12 Aug 2017 05:18 )             34.9          PT/INR - ( 12 Aug 2017 06:55 )   PT: 27.6 sec;   INR: 2.46 ratio           Bilirubin Total, Serum: 1.0 mg/dL (08-12 @ 05:18)    CAPILLARY BLOOD GLUCOSE  178 (12 Aug 2017 12:29)  112 (12 Aug 2017 08:43)  138 (11 Aug 2017 19:51)  74 (11 Aug 2017 17:13)  74 (11 Aug 2017 17:08)               Today's CXR: NAD    PAST MEDICAL & SURGICAL HISTORY:  Stroke syndrome  CAD (coronary artery disease)  LEON (dyspnea on exertion)  Myocardial ischemia  Fatigue  Hypertension  Stented coronary artery: ELLIE x 1  () -- [facility]  Arthritis  Trigger finger  Pacemaker  HTN (hypertension)  Diabetes mellitus  Afib  Status post trigger finger release: Multiple in the past  S/P angioplasty with stent  Pacemaker:      Echo:  read pending

## 2017-08-12 NOTE — PROGRESS NOTE ADULT - ASSESSMENT
85 year old male with PMH Afib, CAD with prior cardiac stent (2011), s/p PPM (BS settings DDD 61/10)), HLD, DM, and TIA; originally presented to cardiologist c/o dyspnea.   Work up requiring nuclear perfusion study was positive for moderate ischemia of moderated sized territory of the anterior wall.     8/2 s/p CABG x 3 and left atrial appendage clip.     Post-op course complicated by hypoxemic acute respiratory failure requiring HFNC O2. Dobutamine intially used post-op for mild RV failure and pressors for post-op vasoplegia all since resolved.  8/7: Patient with continued O2 requirements overnight of 4-5L. Lasix 40mg x 1 given.  Lopressor increased to 100mg BID.  8/8  +Cough.  Suspected Tracheobronchitis.  Zithromax and Tessalon started. > improved symptoms  8/9 Accepted to acute rehab, awaiting authorization from insurance. Denied > peer to peer done and denied. Re-eval by insurance appealed.

## 2017-08-12 NOTE — PROGRESS NOTE ADULT - PROBLEM SELECTOR PLAN 2
S/P FEDERICO clip.  Coumadin daily per INR.  F/U TTE results  Repeat INR at 4pm today to determine tonights Coumadin dose.  Continue Digoxin.

## 2017-08-13 DIAGNOSIS — E83.42 HYPOMAGNESEMIA: ICD-10-CM

## 2017-08-13 DIAGNOSIS — E46 UNSPECIFIED PROTEIN-CALORIE MALNUTRITION: ICD-10-CM

## 2017-08-13 LAB
ANION GAP SERPL CALC-SCNC: 12 MMOL/L — SIGNIFICANT CHANGE UP (ref 5–17)
BUN SERPL-MCNC: 29 MG/DL — HIGH (ref 8–20)
CALCIUM SERPL-MCNC: 8.5 MG/DL — LOW (ref 8.6–10.2)
CHLORIDE SERPL-SCNC: 95 MMOL/L — LOW (ref 98–107)
CO2 SERPL-SCNC: 32 MMOL/L — HIGH (ref 22–29)
CREAT SERPL-MCNC: 0.97 MG/DL — SIGNIFICANT CHANGE UP (ref 0.5–1.3)
GLUCOSE SERPL-MCNC: 170 MG/DL — HIGH (ref 70–115)
HCT VFR BLD CALC: 34.2 % — LOW (ref 42–52)
HGB BLD-MCNC: 11 G/DL — LOW (ref 14–18)
INR BLD: 2.15 RATIO — HIGH (ref 0.88–1.16)
MAGNESIUM SERPL-MCNC: 1.6 MG/DL — SIGNIFICANT CHANGE UP (ref 1.6–2.6)
MCHC RBC-ENTMCNC: 30.6 PG — SIGNIFICANT CHANGE UP (ref 27–31)
MCHC RBC-ENTMCNC: 32.2 G/DL — SIGNIFICANT CHANGE UP (ref 32–36)
MCV RBC AUTO: 95.3 FL — HIGH (ref 80–94)
PLATELET # BLD AUTO: 250 K/UL — SIGNIFICANT CHANGE UP (ref 150–400)
POTASSIUM SERPL-MCNC: 4.4 MMOL/L — SIGNIFICANT CHANGE UP (ref 3.5–5.3)
POTASSIUM SERPL-SCNC: 4.4 MMOL/L — SIGNIFICANT CHANGE UP (ref 3.5–5.3)
PREALB SERPL-MCNC: 10 MG/DL — LOW (ref 18–38)
PROTHROM AB SERPL-ACNC: 24 SEC — HIGH (ref 9.8–12.7)
RBC # BLD: 3.59 M/UL — LOW (ref 4.6–6.2)
RBC # FLD: 14.6 % — SIGNIFICANT CHANGE UP (ref 11–15.6)
SODIUM SERPL-SCNC: 139 MMOL/L — SIGNIFICANT CHANGE UP (ref 135–145)
WBC # BLD: 9.5 K/UL — SIGNIFICANT CHANGE UP (ref 4.8–10.8)
WBC # FLD AUTO: 9.5 K/UL — SIGNIFICANT CHANGE UP (ref 4.8–10.8)

## 2017-08-13 PROCEDURE — 71010: CPT | Mod: 26

## 2017-08-13 RX ORDER — MAGNESIUM SULFATE 500 MG/ML
2 VIAL (ML) INJECTION ONCE
Qty: 0 | Refills: 0 | Status: DISCONTINUED | OUTPATIENT
Start: 2017-08-13 | End: 2017-08-13

## 2017-08-13 RX ORDER — WARFARIN SODIUM 2.5 MG/1
5 TABLET ORAL ONCE
Qty: 0 | Refills: 0 | Status: COMPLETED | OUTPATIENT
Start: 2017-08-13 | End: 2017-08-13

## 2017-08-13 RX ORDER — MAGNESIUM SULFATE 500 MG/ML
2 VIAL (ML) INJECTION
Qty: 0 | Refills: 0 | Status: COMPLETED | OUTPATIENT
Start: 2017-08-13 | End: 2017-08-13

## 2017-08-13 RX ADMIN — Medication 100 MILLIGRAM(S): at 17:04

## 2017-08-13 RX ADMIN — WARFARIN SODIUM 5 MILLIGRAM(S): 2.5 TABLET ORAL at 22:39

## 2017-08-13 RX ADMIN — METFORMIN HYDROCHLORIDE 850 MILLIGRAM(S): 850 TABLET ORAL at 17:04

## 2017-08-13 RX ADMIN — SODIUM CHLORIDE 3 MILLILITER(S): 9 INJECTION INTRAMUSCULAR; INTRAVENOUS; SUBCUTANEOUS at 05:31

## 2017-08-13 RX ADMIN — PANTOPRAZOLE SODIUM 40 MILLIGRAM(S): 20 TABLET, DELAYED RELEASE ORAL at 05:45

## 2017-08-13 RX ADMIN — Medication 1: at 12:27

## 2017-08-13 RX ADMIN — ATORVASTATIN CALCIUM 40 MILLIGRAM(S): 80 TABLET, FILM COATED ORAL at 22:27

## 2017-08-13 RX ADMIN — Medication 1: at 17:10

## 2017-08-13 RX ADMIN — Medication 0.12 MILLIGRAM(S): at 05:45

## 2017-08-13 RX ADMIN — Medication 50 GRAM(S): at 07:02

## 2017-08-13 RX ADMIN — Medication 50 GRAM(S): at 07:55

## 2017-08-13 RX ADMIN — SODIUM CHLORIDE 3 MILLILITER(S): 9 INJECTION INTRAMUSCULAR; INTRAVENOUS; SUBCUTANEOUS at 22:25

## 2017-08-13 RX ADMIN — METFORMIN HYDROCHLORIDE 850 MILLIGRAM(S): 850 TABLET ORAL at 08:46

## 2017-08-13 RX ADMIN — Medication 100 MILLIGRAM(S): at 05:45

## 2017-08-13 RX ADMIN — Medication 100 MILLIGRAM(S): at 22:27

## 2017-08-13 RX ADMIN — Medication 81 MILLIGRAM(S): at 12:27

## 2017-08-13 RX ADMIN — Medication 100 MILLIGRAM(S): at 12:27

## 2017-08-13 RX ADMIN — Medication 40 MILLIGRAM(S): at 05:45

## 2017-08-13 RX ADMIN — SODIUM CHLORIDE 3 MILLILITER(S): 9 INJECTION INTRAMUSCULAR; INTRAVENOUS; SUBCUTANEOUS at 12:32

## 2017-08-13 NOTE — PROGRESS NOTE ADULT - PROBLEM SELECTOR PLAN 7
Lovenox and SCDs for DVT prophylaxis.  Continue GI prophylaxis with Protonix.    Plan for possible discharge to Acute rehab if insurance reeval approves pt to go.  Family does not want pt to go to a subacute facility.  Discussed with CT surgery team and Dr. Hdz in AM rounds. Magnesium repleted .  Repeat in AM.

## 2017-08-13 NOTE — PROGRESS NOTE ADULT - PROBLEM SELECTOR PLAN 2
S/P FEDERICO clip.  Coumadin daily per INR.  TTE from 8/11 negative for pericardial effusion.  Continue Digoxin.

## 2017-08-13 NOTE — PROGRESS NOTE ADULT - SUBJECTIVE AND OBJECTIVE BOX
Subjective: "I feel ok.  My cough is better."  Sitting up in bed.  Denies SOB or CP.  NAD noted.      Tele:  AFib / Vpaced                        T(F): 97.8 (17 @ 21:30), Max: 98.7 (17 @ 15:00)  HR: 85 (17 @ 06:00) (81 - 94)  BP: 122/68 (17 @ 06:00) (98/60 - 122/68)  RR: 18 (17 @ 06:00) (17 - 20)  SpO2: 97% (17 @ 06:00) (92% - 98%) on 3 liters nasal O2.          Daily     Daily Weight in k.7 (13 Aug 2017 06:59)    LV EF: 55-60%      Allergies:  epinephrine (Other)        139  |  95<L>  |  29.0<H>  ----------------------------<  170<H>  4.4   |  32.0<H>  |  0.97    Ca    8.5<L>      13 Aug 2017 05:19  Phos  3.3       Mg     1.6         TPro  5.9<L>  /  Alb  3.5  /  TBili  1.0  /  DBili  x   /  AST  19  /  ALT  15  /  AlkPhos  68  12                               11.0   9.5   )-----------( 250      ( 13 Aug 2017 05:19 )             34.2        PT/INR - ( 13 Aug 2017 05:19 )   PT: 24.0 sec;   INR: 2.15 ratio      Prealbumin, Serum: 10 mg/dL (17 @ 05:19)              CAPILLARY BLOOD GLUCOSE  141 (13 Aug 2017 08:15)  167 (12 Aug 2017 21:30)  129 (12 Aug 2017 17:00)  178 (12 Aug 2017 12:29)  112 (12 Aug 2017 08:43)               CXR: pending    TTE:   TTE Echo Limited or F/U (17 @ 17:31) >  PHYSICIAN INTERPRETATION:  Left Ventricle: The left ventricular internal cavity size is normal.  Global LV systolic function was normal. Left ventricular ejection   fraction, by visual estimation, is 55 to 60%.  Right Ventricle: The right ventricular size is normal. RV systolic   function is normal.  Pericardium: There is no evidence of pericardial effusion.  Mitral Valve: Mild mitral valve regurgitation is seen.  Venous: The inferior vena cava was normal sized, with respiratory size   variation less than 50%.        Summary:   1. Limited study f/u for pericardial effusion.   2. Left ventricular ejection fraction, by visual estimation, is 55 to   60%.   3. Normal global left ventricular systolic function.   4. There is no evidence of pericardial effusion.     MD Agustin Electronically signed on 2017 at 6:10:06 PM            I&O's Detail    12 Aug 2017 07:01  -  13 Aug 2017 07:00  --------------------------------------------------------  IN:    Oral Fluid: 240 mL  Total IN: 240 mL    OUT:    Stool: 1 mL    Voided: 1295 mL  Total OUT: 1296 mL    Total NET: -1056 mL          CHEST TUBE:  [ ] YES [x ] NO  OUTPUT:     per 24 hours    AIR LEAKS:  [ ] YES [ ] NO      NORM DRAIN:   [ ] YES [x ] NO  OUTPUT:     per 24 hours    EPICARDIAL WIRES:  [ ] YES [x ] NO      BOWEL MOVEMENT:  [x ] YES [ ] NO        Active Medications:  docusate sodium 100 milliGRAM(s) Oral three times a day  dextrose 50% Injectable 50 milliLiter(s) IV Push every 15 minutes  dextrose 50% Injectable 25 milliLiter(s) IV Push every 15 minutes  enoxaparin Injectable 40 milliGRAM(s) SubCutaneous daily  digoxin     Tablet 0.125 milliGRAM(s) Oral daily  sodium chloride 0.9% lock flush 3 milliLiter(s) IV Push every 8 hours  pantoprazole    Tablet 40 milliGRAM(s) Oral before breakfast  aspirin enteric coated 81 milliGRAM(s) Oral daily  atorvastatin 40 milliGRAM(s) Oral at bedtime  metoprolol 100 milliGRAM(s) Oral two times a day  benzonatate 100 milliGRAM(s) Oral three times a day PRN  oxyCODONE    5 mG/acetaminophen 325 mG 1 Tablet(s) Oral every 6 hours PRN  furosemide    Tablet 40 milliGRAM(s) Oral daily  metFORMIN 850 milliGRAM(s) Oral two times a day with meals  insulin lispro (HumaLOG) corrective regimen sliding scale   SubCutaneous three times a day before meals      Physical Exam:    Neuro: AAOX3.  No focal deficits.    Pulm: Diminished BLL.  Equal bilaterally.    CV: Irregular.  +S1+S2.    Abd: Soft/NT/ND.  +BS.  +BM 8/12 per pt.    Extremities: Trace edema BLE.  +pp.    Incision(s): MSI LUCY and without erythema or drainage.  Sternum stable. RLE EVH incision LUCY and healing well.              PAST MEDICAL & SURGICAL HISTORY:  Stroke syndrome  CAD (coronary artery disease)  LEON (dyspnea on exertion)  Myocardial ischemia  Fatigue  Hypertension  Stented coronary artery: ELLIE x 1  () -- [facility]  Arthritis  Trigger finger  Pacemaker  HTN (hypertension)  Diabetes mellitus  Afib  Status post trigger finger release: Multiple in the past  S/P angioplasty with stent  Pacemaker:

## 2017-08-14 LAB
ALBUMIN SERPL ELPH-MCNC: 3.4 G/DL — SIGNIFICANT CHANGE UP (ref 3.3–5.2)
ALP SERPL-CCNC: 79 U/L — SIGNIFICANT CHANGE UP (ref 40–120)
ALT FLD-CCNC: 14 U/L — SIGNIFICANT CHANGE UP
ANION GAP SERPL CALC-SCNC: 12 MMOL/L — SIGNIFICANT CHANGE UP (ref 5–17)
AST SERPL-CCNC: 20 U/L — SIGNIFICANT CHANGE UP
BILIRUB SERPL-MCNC: 0.7 MG/DL — SIGNIFICANT CHANGE UP (ref 0.4–2)
BUN SERPL-MCNC: 30 MG/DL — HIGH (ref 8–20)
CALCIUM SERPL-MCNC: 8.8 MG/DL — SIGNIFICANT CHANGE UP (ref 8.6–10.2)
CHLORIDE SERPL-SCNC: 94 MMOL/L — LOW (ref 98–107)
CO2 SERPL-SCNC: 32 MMOL/L — HIGH (ref 22–29)
CREAT SERPL-MCNC: 1.04 MG/DL — SIGNIFICANT CHANGE UP (ref 0.5–1.3)
GLUCOSE SERPL-MCNC: 153 MG/DL — HIGH (ref 70–115)
HCT VFR BLD CALC: 35.3 % — LOW (ref 42–52)
HGB BLD-MCNC: 11.4 G/DL — LOW (ref 14–18)
INR BLD: 2.03 RATIO — HIGH (ref 0.88–1.16)
MAGNESIUM SERPL-MCNC: 2 MG/DL — SIGNIFICANT CHANGE UP (ref 1.6–2.6)
MCHC RBC-ENTMCNC: 30.6 PG — SIGNIFICANT CHANGE UP (ref 27–31)
MCHC RBC-ENTMCNC: 32.3 G/DL — SIGNIFICANT CHANGE UP (ref 32–36)
MCV RBC AUTO: 94.9 FL — HIGH (ref 80–94)
PHOSPHATE SERPL-MCNC: 2.9 MG/DL — SIGNIFICANT CHANGE UP (ref 2.4–4.7)
PLATELET # BLD AUTO: 277 K/UL — SIGNIFICANT CHANGE UP (ref 150–400)
POTASSIUM SERPL-MCNC: 4.4 MMOL/L — SIGNIFICANT CHANGE UP (ref 3.5–5.3)
POTASSIUM SERPL-SCNC: 4.4 MMOL/L — SIGNIFICANT CHANGE UP (ref 3.5–5.3)
PROT SERPL-MCNC: 6.2 G/DL — LOW (ref 6.6–8.7)
PROTHROM AB SERPL-ACNC: 22.6 SEC — HIGH (ref 9.8–12.7)
RBC # BLD: 3.72 M/UL — LOW (ref 4.6–6.2)
RBC # FLD: 14.6 % — SIGNIFICANT CHANGE UP (ref 11–15.6)
SODIUM SERPL-SCNC: 138 MMOL/L — SIGNIFICANT CHANGE UP (ref 135–145)
WBC # BLD: 11.8 K/UL — HIGH (ref 4.8–10.8)
WBC # FLD AUTO: 11.8 K/UL — HIGH (ref 4.8–10.8)

## 2017-08-14 PROCEDURE — 71010: CPT | Mod: 26

## 2017-08-14 RX ORDER — WARFARIN SODIUM 2.5 MG/1
5 TABLET ORAL ONCE
Qty: 0 | Refills: 0 | Status: COMPLETED | OUTPATIENT
Start: 2017-08-14 | End: 2017-08-14

## 2017-08-14 RX ADMIN — Medication 100 MILLIGRAM(S): at 05:42

## 2017-08-14 RX ADMIN — Medication 0.12 MILLIGRAM(S): at 05:42

## 2017-08-14 RX ADMIN — Medication 81 MILLIGRAM(S): at 12:29

## 2017-08-14 RX ADMIN — PANTOPRAZOLE SODIUM 40 MILLIGRAM(S): 20 TABLET, DELAYED RELEASE ORAL at 05:42

## 2017-08-14 RX ADMIN — Medication 100 MILLIGRAM(S): at 18:08

## 2017-08-14 RX ADMIN — METFORMIN HYDROCHLORIDE 850 MILLIGRAM(S): 850 TABLET ORAL at 18:08

## 2017-08-14 RX ADMIN — SODIUM CHLORIDE 3 MILLILITER(S): 9 INJECTION INTRAMUSCULAR; INTRAVENOUS; SUBCUTANEOUS at 05:44

## 2017-08-14 RX ADMIN — Medication 40 MILLIGRAM(S): at 05:42

## 2017-08-14 RX ADMIN — METFORMIN HYDROCHLORIDE 850 MILLIGRAM(S): 850 TABLET ORAL at 08:59

## 2017-08-14 RX ADMIN — Medication 100 MILLIGRAM(S): at 22:19

## 2017-08-14 RX ADMIN — SODIUM CHLORIDE 3 MILLILITER(S): 9 INJECTION INTRAMUSCULAR; INTRAVENOUS; SUBCUTANEOUS at 22:20

## 2017-08-14 RX ADMIN — ATORVASTATIN CALCIUM 40 MILLIGRAM(S): 80 TABLET, FILM COATED ORAL at 22:19

## 2017-08-14 RX ADMIN — WARFARIN SODIUM 5 MILLIGRAM(S): 2.5 TABLET ORAL at 08:26

## 2017-08-14 RX ADMIN — SODIUM CHLORIDE 3 MILLILITER(S): 9 INJECTION INTRAMUSCULAR; INTRAVENOUS; SUBCUTANEOUS at 14:56

## 2017-08-14 RX ADMIN — Medication 1: at 12:28

## 2017-08-14 NOTE — PROGRESS NOTE ADULT - SUBJECTIVE AND OBJECTIVE BOX
Subjective:  "Im feeling ok...would like to start my rehab though"  OOB chair      Tele:           VPace 70s    V/S:                    T(F): 97.9 (17 @ 05:35), Max: 98.1 (17 @ 22:21)  HR: 95 (17 @ 05:35) (79 - 109)  BP: 110/56 (17 @ 05:35) (92/50 - 125/62)  RR: 16 (17 @ 05:35) (16 - 20)  SpO2: 95% (17 @ 05:35) (95% - 99%)      LV EF:nml      Labs:      138  |  94<L>  |  30.0<H>  ----------------------------<  153<H>  4.4   |  32.0<H>  |  1.04    Ca    8.8      14 Aug 2017 05:35  Phos  2.9       Mg     2.0         TPro  6.2<L>  /  Alb  3.4  /  TBili  0.7  /  DBili  x   /  AST  20  /  ALT  14  /  AlkPhos  79                                 11.4   11.8  )-----------( 277      ( 14 Aug 2017 05:35 )             35.3        PT/INR - ( 14 Aug 2017 03:36 )   PT: 22.6 sec;   INR: 2.03 ratio              CAPILLARY BLOOD GLUCOSE  142 (14 Aug 2017 07:35)  135 (13 Aug 2017 22:21)  135 (13 Aug 2017 22:13)  151 (13 Aug 2017 17:09)  178 (13 Aug 2017 12:00)               CXR:   Improved aeration at the left lung base with decreasing left pleural   effusion and decreasing left basilar atelectasis since the prior day.   Persistent linear atelectasis at the right lung base, unchanged..      I&O's Detail    13 Aug 2017 07:01  -  14 Aug 2017 07:00  --------------------------------------------------------  IN:    Oral Fluid: 240 mL  Total IN: 240 mL    OUT:    Voided: 200 mL  Total OUT: 200 mL    Total NET: 40 mL          CHEST TUBE:  [ ] YES [x ] NO  OUTPUT:     per 24 hours        NORM DRAIN:   [ ] YES [x ] NO  OUTPUT:     per 24 hours    EPICARDIAL WIRES:  [ ] YES [x ] NO      BOWEL MOVEMENT:  [x ] YES [ ] NO        Daily Weight in k.6 (14 Aug 2017 06:55)      Medications:  docusate sodium 100 milliGRAM(s) Oral three times a day  dextrose 50% Injectable 50 milliLiter(s) IV Push every 15 minutes  dextrose 50% Injectable 25 milliLiter(s) IV Push every 15 minutes  digoxin     Tablet 0.125 milliGRAM(s) Oral daily  sodium chloride 0.9% lock flush 3 milliLiter(s) IV Push every 8 hours  pantoprazole    Tablet 40 milliGRAM(s) Oral before breakfast  aspirin enteric coated 81 milliGRAM(s) Oral daily  atorvastatin 40 milliGRAM(s) Oral at bedtime  metoprolol 100 milliGRAM(s) Oral two times a day  benzonatate 100 milliGRAM(s) Oral three times a day PRN  oxyCODONE    5 mG/acetaminophen 325 mG 1 Tablet(s) Oral every 6 hours PRN  furosemide    Tablet 40 milliGRAM(s) Oral daily  metFORMIN 850 milliGRAM(s) Oral two times a day with meals  insulin lispro (HumaLOG) corrective regimen sliding scale   SubCutaneous three times a day before meals        Physical Exam:    Neuro:   alert, no deficits    Pulm: diminshed, demonstrates fair use incentive spirometry    CV:  S1  S2  RRR    Abd: soft  non tender  +  BS    Extremities:   RLE  EVH  site w/ healing scab  1+ edema B/L    Incision(s): sternum stable, incision clean                 PAST MEDICAL & SURGICAL HISTORY:  Stroke syndrome  CAD (coronary artery disease)  LEON (dyspnea on exertion)  Myocardial ischemia  Fatigue  Hypertension  Stented coronary artery: ELLIE x 1  () -- [facility]  Arthritis  Trigger finger  Pacemaker  HTN (hypertension)  Diabetes mellitus  Afib  Status post trigger finger release: Multiple in the past  S/P angioplasty with stent  Pacemaker:

## 2017-08-15 LAB
ALBUMIN SERPL ELPH-MCNC: 3.2 G/DL — LOW (ref 3.3–5.2)
ALP SERPL-CCNC: 76 U/L — SIGNIFICANT CHANGE UP (ref 40–120)
ALT FLD-CCNC: 21 U/L — SIGNIFICANT CHANGE UP
ANION GAP SERPL CALC-SCNC: 14 MMOL/L — SIGNIFICANT CHANGE UP (ref 5–17)
AST SERPL-CCNC: 31 U/L — SIGNIFICANT CHANGE UP
BILIRUB SERPL-MCNC: 0.7 MG/DL — SIGNIFICANT CHANGE UP (ref 0.4–2)
BUN SERPL-MCNC: 33 MG/DL — HIGH (ref 8–20)
CALCIUM SERPL-MCNC: 8.2 MG/DL — LOW (ref 8.6–10.2)
CHLORIDE SERPL-SCNC: 93 MMOL/L — LOW (ref 98–107)
CO2 SERPL-SCNC: 31 MMOL/L — HIGH (ref 22–29)
CREAT SERPL-MCNC: 1.06 MG/DL — SIGNIFICANT CHANGE UP (ref 0.5–1.3)
GLUCOSE SERPL-MCNC: 139 MG/DL — HIGH (ref 70–115)
HCT VFR BLD CALC: 34.5 % — LOW (ref 42–52)
HGB BLD-MCNC: 11.3 G/DL — LOW (ref 14–18)
INR BLD: 2.61 RATIO — HIGH (ref 0.88–1.16)
MAGNESIUM SERPL-MCNC: 1.8 MG/DL — SIGNIFICANT CHANGE UP (ref 1.6–2.6)
MCHC RBC-ENTMCNC: 30.6 PG — SIGNIFICANT CHANGE UP (ref 27–31)
MCHC RBC-ENTMCNC: 32.8 G/DL — SIGNIFICANT CHANGE UP (ref 32–36)
MCV RBC AUTO: 93.5 FL — SIGNIFICANT CHANGE UP (ref 80–94)
PLATELET # BLD AUTO: 261 K/UL — SIGNIFICANT CHANGE UP (ref 150–400)
POTASSIUM SERPL-MCNC: 3.9 MMOL/L — SIGNIFICANT CHANGE UP (ref 3.5–5.3)
POTASSIUM SERPL-SCNC: 3.9 MMOL/L — SIGNIFICANT CHANGE UP (ref 3.5–5.3)
PROT SERPL-MCNC: 5.9 G/DL — LOW (ref 6.6–8.7)
PROTHROM AB SERPL-ACNC: 29.3 SEC — HIGH (ref 9.8–12.7)
RBC # BLD: 3.69 M/UL — LOW (ref 4.6–6.2)
RBC # FLD: 14.6 % — SIGNIFICANT CHANGE UP (ref 11–15.6)
SODIUM SERPL-SCNC: 138 MMOL/L — SIGNIFICANT CHANGE UP (ref 135–145)
WBC # BLD: 10.5 K/UL — SIGNIFICANT CHANGE UP (ref 4.8–10.8)
WBC # FLD AUTO: 10.5 K/UL — SIGNIFICANT CHANGE UP (ref 4.8–10.8)

## 2017-08-15 PROCEDURE — 71010: CPT | Mod: 26

## 2017-08-15 PROCEDURE — 93010 ELECTROCARDIOGRAM REPORT: CPT

## 2017-08-15 RX ORDER — MAGNESIUM OXIDE 400 MG ORAL TABLET 241.3 MG
400 TABLET ORAL
Qty: 0 | Refills: 0 | Status: DISCONTINUED | OUTPATIENT
Start: 2017-08-15 | End: 2017-08-17

## 2017-08-15 RX ORDER — WARFARIN SODIUM 2.5 MG/1
5 TABLET ORAL ONCE
Qty: 0 | Refills: 0 | Status: COMPLETED | OUTPATIENT
Start: 2017-08-15 | End: 2017-08-15

## 2017-08-15 RX ORDER — MAGNESIUM OXIDE 400 MG ORAL TABLET 241.3 MG
400 TABLET ORAL ONCE
Qty: 0 | Refills: 0 | Status: COMPLETED | OUTPATIENT
Start: 2017-08-15 | End: 2017-08-15

## 2017-08-15 RX ORDER — POTASSIUM CHLORIDE 20 MEQ
40 PACKET (EA) ORAL ONCE
Qty: 0 | Refills: 0 | Status: COMPLETED | OUTPATIENT
Start: 2017-08-15 | End: 2017-08-15

## 2017-08-15 RX ORDER — BENZOCAINE AND MENTHOL 5; 1 G/100ML; G/100ML
1 LIQUID ORAL
Qty: 0 | Refills: 0 | Status: DISCONTINUED | OUTPATIENT
Start: 2017-08-15 | End: 2017-08-17

## 2017-08-15 RX ADMIN — Medication 1: at 17:39

## 2017-08-15 RX ADMIN — METFORMIN HYDROCHLORIDE 850 MILLIGRAM(S): 850 TABLET ORAL at 17:39

## 2017-08-15 RX ADMIN — BENZOCAINE AND MENTHOL 1 LOZENGE: 5; 1 LIQUID ORAL at 11:17

## 2017-08-15 RX ADMIN — Medication 100 MILLIGRAM(S): at 22:58

## 2017-08-15 RX ADMIN — Medication 0.12 MILLIGRAM(S): at 05:20

## 2017-08-15 RX ADMIN — Medication 1: at 08:39

## 2017-08-15 RX ADMIN — METFORMIN HYDROCHLORIDE 850 MILLIGRAM(S): 850 TABLET ORAL at 08:39

## 2017-08-15 RX ADMIN — Medication 40 MILLIGRAM(S): at 05:20

## 2017-08-15 RX ADMIN — Medication 100 MILLIGRAM(S): at 05:20

## 2017-08-15 RX ADMIN — MAGNESIUM OXIDE 400 MG ORAL TABLET 400 MILLIGRAM(S): 241.3 TABLET ORAL at 07:59

## 2017-08-15 RX ADMIN — Medication 100 MILLIGRAM(S): at 08:39

## 2017-08-15 RX ADMIN — PANTOPRAZOLE SODIUM 40 MILLIGRAM(S): 20 TABLET, DELAYED RELEASE ORAL at 05:20

## 2017-08-15 RX ADMIN — Medication 100 MILLIGRAM(S): at 17:39

## 2017-08-15 RX ADMIN — SODIUM CHLORIDE 3 MILLILITER(S): 9 INJECTION INTRAMUSCULAR; INTRAVENOUS; SUBCUTANEOUS at 13:56

## 2017-08-15 RX ADMIN — Medication 81 MILLIGRAM(S): at 11:16

## 2017-08-15 RX ADMIN — BENZOCAINE AND MENTHOL 1 LOZENGE: 5; 1 LIQUID ORAL at 17:39

## 2017-08-15 RX ADMIN — Medication 40 MILLIEQUIVALENT(S): at 07:59

## 2017-08-15 RX ADMIN — ATORVASTATIN CALCIUM 40 MILLIGRAM(S): 80 TABLET, FILM COATED ORAL at 22:59

## 2017-08-15 RX ADMIN — WARFARIN SODIUM 5 MILLIGRAM(S): 2.5 TABLET ORAL at 22:59

## 2017-08-15 NOTE — PROGRESS NOTE ADULT - ASSESSMENT
85 year old male with PMH Afib, CAD with prior cardiac stent (2011), s/p PPM (BS settings DDD 61/10)), HLD, DM, and TIA; originally presented to cardiologist c/o dyspnea.  Work up requiring nuclear perfusion study was positive for moderate ischemia of moderated sized territory of the anterior wall.     8/2 s/p CABG x 3 and left atrial appendage clip.     Post-op course complicated by hypoxemic acute respiratory failure requiring HFNC O2. Dobutamine intially used post-op for mild RV failure and pressors for post-op vasoplegia all since resolved.  8/7: Patient with continued O2 requirements overnight of 4-5L. Lasix 40mg x 1 given.  Lopressor increased to 100mg BID.  8/8  +Cough.  Suspected Tracheobronchitis.  Zithromax and Tessalon started. > improved symptoms  8/9 Accepted to acute rehab, awaiting authorization from insurance. Denied > peer to peer done and denied. Re-eval by insurance appealed.  8/11: TTE: No pericardial effusion. EF 55-60%.

## 2017-08-15 NOTE — PROGRESS NOTE ADULT - PROBLEM SELECTOR PLAN 2
S/P FEDERICO clip  Coumadin daily per INR  TTE from 8/11 negative for pericardial effusion  Continue Digoxin, Lopressor

## 2017-08-16 LAB
ALBUMIN SERPL ELPH-MCNC: 3.9 G/DL — SIGNIFICANT CHANGE UP (ref 3.3–5.2)
ALP SERPL-CCNC: 92 U/L — SIGNIFICANT CHANGE UP (ref 40–120)
ALT FLD-CCNC: 21 U/L — SIGNIFICANT CHANGE UP
ANION GAP SERPL CALC-SCNC: 14 MMOL/L — SIGNIFICANT CHANGE UP (ref 5–17)
AST SERPL-CCNC: 26 U/L — SIGNIFICANT CHANGE UP
BILIRUB SERPL-MCNC: 1 MG/DL — SIGNIFICANT CHANGE UP (ref 0.4–2)
BUN SERPL-MCNC: 31 MG/DL — HIGH (ref 8–20)
CALCIUM SERPL-MCNC: 8.7 MG/DL — SIGNIFICANT CHANGE UP (ref 8.6–10.2)
CHLORIDE SERPL-SCNC: 95 MMOL/L — LOW (ref 98–107)
CO2 SERPL-SCNC: 29 MMOL/L — SIGNIFICANT CHANGE UP (ref 22–29)
CREAT SERPL-MCNC: 0.85 MG/DL — SIGNIFICANT CHANGE UP (ref 0.5–1.3)
GLUCOSE SERPL-MCNC: 138 MG/DL — HIGH (ref 70–115)
HCT VFR BLD CALC: 37.5 % — LOW (ref 42–52)
HGB BLD-MCNC: 12.4 G/DL — LOW (ref 14–18)
INR BLD: 2.59 RATIO — HIGH (ref 0.88–1.16)
MAGNESIUM SERPL-MCNC: 1.7 MG/DL — SIGNIFICANT CHANGE UP (ref 1.6–2.6)
MCHC RBC-ENTMCNC: 30.7 PG — SIGNIFICANT CHANGE UP (ref 27–31)
MCHC RBC-ENTMCNC: 33.1 G/DL — SIGNIFICANT CHANGE UP (ref 32–36)
MCV RBC AUTO: 92.8 FL — SIGNIFICANT CHANGE UP (ref 80–94)
PLATELET # BLD AUTO: 272 K/UL — SIGNIFICANT CHANGE UP (ref 150–400)
POTASSIUM SERPL-MCNC: 3.8 MMOL/L — SIGNIFICANT CHANGE UP (ref 3.5–5.3)
POTASSIUM SERPL-SCNC: 3.8 MMOL/L — SIGNIFICANT CHANGE UP (ref 3.5–5.3)
PROT SERPL-MCNC: 6.9 G/DL — SIGNIFICANT CHANGE UP (ref 6.6–8.7)
PROTHROM AB SERPL-ACNC: 29 SEC — HIGH (ref 9.8–12.7)
RBC # BLD: 4.04 M/UL — LOW (ref 4.6–6.2)
RBC # FLD: 14.6 % — SIGNIFICANT CHANGE UP (ref 11–15.6)
SODIUM SERPL-SCNC: 138 MMOL/L — SIGNIFICANT CHANGE UP (ref 135–145)
WBC # BLD: 11.1 K/UL — HIGH (ref 4.8–10.8)
WBC # FLD AUTO: 11.1 K/UL — HIGH (ref 4.8–10.8)

## 2017-08-16 RX ORDER — MAGNESIUM SULFATE 500 MG/ML
2 VIAL (ML) INJECTION ONCE
Qty: 0 | Refills: 0 | Status: COMPLETED | OUTPATIENT
Start: 2017-08-16 | End: 2017-08-16

## 2017-08-16 RX ORDER — MAGNESIUM OXIDE 400 MG ORAL TABLET 241.3 MG
1 TABLET ORAL
Qty: 30 | Refills: 1 | OUTPATIENT
Start: 2017-08-16 | End: 2017-10-14

## 2017-08-16 RX ORDER — POTASSIUM CHLORIDE 20 MEQ
40 PACKET (EA) ORAL ONCE
Qty: 0 | Refills: 0 | Status: COMPLETED | OUTPATIENT
Start: 2017-08-16 | End: 2017-08-16

## 2017-08-16 RX ORDER — DIGOXIN 250 MCG
1 TABLET ORAL
Qty: 30 | Refills: 1 | OUTPATIENT
Start: 2017-08-16 | End: 2017-10-14

## 2017-08-16 RX ORDER — PANTOPRAZOLE SODIUM 20 MG/1
1 TABLET, DELAYED RELEASE ORAL
Qty: 14 | Refills: 0 | OUTPATIENT
Start: 2017-08-16 | End: 2017-08-30

## 2017-08-16 RX ORDER — DOCUSATE SODIUM 100 MG
1 CAPSULE ORAL
Qty: 0 | Refills: 0 | COMMUNITY
Start: 2017-08-16

## 2017-08-16 RX ORDER — METFORMIN HYDROCHLORIDE 850 MG/1
1 TABLET ORAL
Qty: 60 | Refills: 1 | OUTPATIENT
Start: 2017-08-16 | End: 2017-10-14

## 2017-08-16 RX ORDER — ASPIRIN/CALCIUM CARB/MAGNESIUM 324 MG
1 TABLET ORAL
Qty: 0 | Refills: 0 | COMMUNITY
Start: 2017-08-16

## 2017-08-16 RX ORDER — WARFARIN SODIUM 2.5 MG/1
1 TABLET ORAL
Qty: 30 | Refills: 1 | OUTPATIENT
Start: 2017-08-16 | End: 2017-10-14

## 2017-08-16 RX ORDER — ATORVASTATIN CALCIUM 80 MG/1
1 TABLET, FILM COATED ORAL
Qty: 30 | Refills: 1 | OUTPATIENT
Start: 2017-08-16 | End: 2017-10-14

## 2017-08-16 RX ORDER — WARFARIN SODIUM 2.5 MG/1
5 TABLET ORAL ONCE
Qty: 0 | Refills: 0 | Status: COMPLETED | OUTPATIENT
Start: 2017-08-16 | End: 2017-08-16

## 2017-08-16 RX ORDER — FUROSEMIDE 40 MG
1 TABLET ORAL
Qty: 14 | Refills: 0 | OUTPATIENT
Start: 2017-08-16 | End: 2017-08-30

## 2017-08-16 RX ORDER — SIMVASTATIN 20 MG/1
1 TABLET, FILM COATED ORAL
Qty: 0 | Refills: 0 | COMMUNITY

## 2017-08-16 RX ORDER — METOPROLOL TARTRATE 50 MG
1 TABLET ORAL
Qty: 60 | Refills: 1 | OUTPATIENT
Start: 2017-08-16 | End: 2017-10-14

## 2017-08-16 RX ADMIN — SODIUM CHLORIDE 3 MILLILITER(S): 9 INJECTION INTRAMUSCULAR; INTRAVENOUS; SUBCUTANEOUS at 21:42

## 2017-08-16 RX ADMIN — Medication 50 GRAM(S): at 09:18

## 2017-08-16 RX ADMIN — Medication 100 MILLIGRAM(S): at 06:23

## 2017-08-16 RX ADMIN — SODIUM CHLORIDE 3 MILLILITER(S): 9 INJECTION INTRAMUSCULAR; INTRAVENOUS; SUBCUTANEOUS at 05:35

## 2017-08-16 RX ADMIN — Medication 1: at 11:56

## 2017-08-16 RX ADMIN — Medication 1: at 08:47

## 2017-08-16 RX ADMIN — WARFARIN SODIUM 5 MILLIGRAM(S): 2.5 TABLET ORAL at 21:50

## 2017-08-16 RX ADMIN — PANTOPRAZOLE SODIUM 40 MILLIGRAM(S): 20 TABLET, DELAYED RELEASE ORAL at 05:37

## 2017-08-16 RX ADMIN — Medication 0.12 MILLIGRAM(S): at 05:36

## 2017-08-16 RX ADMIN — Medication 100 MILLIGRAM(S): at 05:36

## 2017-08-16 RX ADMIN — Medication 100 MILLIGRAM(S): at 17:38

## 2017-08-16 RX ADMIN — Medication 40 MILLIGRAM(S): at 05:36

## 2017-08-16 RX ADMIN — METFORMIN HYDROCHLORIDE 850 MILLIGRAM(S): 850 TABLET ORAL at 08:46

## 2017-08-16 RX ADMIN — MAGNESIUM OXIDE 400 MG ORAL TABLET 400 MILLIGRAM(S): 241.3 TABLET ORAL at 08:46

## 2017-08-16 RX ADMIN — Medication 81 MILLIGRAM(S): at 11:56

## 2017-08-16 RX ADMIN — Medication 40 MILLIEQUIVALENT(S): at 09:18

## 2017-08-16 RX ADMIN — METFORMIN HYDROCHLORIDE 850 MILLIGRAM(S): 850 TABLET ORAL at 17:38

## 2017-08-16 RX ADMIN — SODIUM CHLORIDE 3 MILLILITER(S): 9 INJECTION INTRAMUSCULAR; INTRAVENOUS; SUBCUTANEOUS at 05:36

## 2017-08-16 RX ADMIN — ATORVASTATIN CALCIUM 40 MILLIGRAM(S): 80 TABLET, FILM COATED ORAL at 21:50

## 2017-08-16 RX ADMIN — SODIUM CHLORIDE 3 MILLILITER(S): 9 INJECTION INTRAMUSCULAR; INTRAVENOUS; SUBCUTANEOUS at 13:52

## 2017-08-16 NOTE — PROGRESS NOTE ADULT - SUBJECTIVE AND OBJECTIVE BOX
Subjective: "I'm feeling OK.  I had some coughing lastnight but the medicine helped."  Sitting up in chair.  Denies SOB or CP.  NAD noted.      Tele: Afib underlying.  Paced.                        T(F): 97.9 (17 @ 05:34), Max: 98.6 (08-15-17 @ 16:18)  HR: 88 (17 @ 05:34) (58 - 88)  BP: 110/58 (17 @ 05:34) (102/60 - 110/58)  RR: 18 (17 @ 05:34) (18 - 18)  SpO2:   97% on 2 liters nasal O2 at rest.  82% with ambulation on room air.  96% with ambulation on 2 liters nasal O2.          Daily     Daily Weight in k.6 (16 Aug 2017 06:44)    LV EF: 55-60%    Allergies:  epinephrine (Other)        138  |  95<L>  |  31.0<H>  ----------------------------<  138<H>  3.8   |  29.0  |  0.85    Ca    8.7      16 Aug 2017 07:35  Mg     1.7         TPro  6.9  /  Alb  3.9  /  TBili  1.0  /  DBili  x   /  AST  26  /  ALT  21  /  AlkPhos  92                                 12.4   11.1  )-----------( 272      ( 16 Aug 2017 07:35 )             37.5        PT/INR - ( 16 Aug 2017 07:35 )   PT: 29.0 sec;   INR: 2.59 ratio                CAPILLARY BLOOD GLUCOSE  161 (16 Aug 2017 07:34)  190 (15 Aug 2017 22:53)  152 (15 Aug 2017 17:38)  134 (15 Aug 2017 11:44)               CXR: NA    I&O's Detail    15 Aug 2017 07:01  -  16 Aug 2017 07:00  --------------------------------------------------------  IN:    Oral Fluid: 780 mL  Total IN: 780 mL    OUT:    Voided: 650 mL  Total OUT: 650 mL    Total NET: 130 mL      16 Aug 2017 07:01  -  16 Aug 2017 10:53  --------------------------------------------------------  IN:  Total IN: 0 mL    OUT:    Voided: 300 mL  Total OUT: 300 mL    Total NET: -300 mL          CHEST TUBE:  [ ] YES [ x] NO  OUTPUT:     per 24 hours    AIR LEAKS:  [ ] YES [ ] NO      NORM DRAIN:   [ ] YES [x ] NO  OUTPUT:     per 24 hours    EPICARDIAL WIRES:  [ ] YES [x ] NO      BOWEL MOVEMENT:  [x ] YES [ ] NO        Active Medications:  docusate sodium 100 milliGRAM(s) Oral three times a day  dextrose 50% Injectable 50 milliLiter(s) IV Push every 15 minutes  dextrose 50% Injectable 25 milliLiter(s) IV Push every 15 minutes  digoxin     Tablet 0.125 milliGRAM(s) Oral daily  sodium chloride 0.9% lock flush 3 milliLiter(s) IV Push every 8 hours  pantoprazole    Tablet 40 milliGRAM(s) Oral before breakfast  aspirin enteric coated 81 milliGRAM(s) Oral daily  atorvastatin 40 milliGRAM(s) Oral at bedtime  metoprolol 100 milliGRAM(s) Oral two times a day  benzonatate 100 milliGRAM(s) Oral three times a day PRN  furosemide    Tablet 40 milliGRAM(s) Oral daily  metFORMIN 850 milliGRAM(s) Oral two times a day with meals  insulin lispro (HumaLOG) corrective regimen sliding scale   SubCutaneous three times a day before meals  benzocaine 15 mG/menthol 3.6 mG Lozenge 1 Lozenge Oral two times a day PRN  magnesium oxide 400 milliGRAM(s) Oral with breakfast  warfarin 5 milliGRAM(s) Oral once      Physical Exam:    Neuro: AAOX3.  No focal deficits.    Pulm: Diminished BLL.      CV: Irregular.  +S1+S2.    Abd: Soft/NT/ND. +BS.  +BM 8/15 per pt.    Extremities: Trace edema BLE.  +pp.    Incision(s): MSI LUCY and without erythema or drainage.  Sternum stable.  RLE EVH site LUCY and healing well.                        PAST MEDICAL & SURGICAL HISTORY:  Stroke syndrome  CAD (coronary artery disease)  LEON (dyspnea on exertion)  Myocardial ischemia  Fatigue  Hypertension  Stented coronary artery: ELLIE x 1  () -- [facility]  Arthritis  Trigger finger  Pacemaker  HTN (hypertension)  Diabetes mellitus  Afib  Status post trigger finger release: Multiple in the past  S/P angioplasty with stent  Pacemaker:

## 2017-08-16 NOTE — PROGRESS NOTE ADULT - PROBLEM SELECTOR PLAN 8
Continue GI prophylaxis with Protonix.  Denied by insurance for AR and KATHRYN.  Potential discharge home with walker and home o2.  Discussed with CT surgery team and Dr. Staples in AM rounds.
Continue GI prophylaxis with Protonix.  await insurance approval for  Acute rehab   Discussed with CT surgery team  in AM rounds.
Continue GI prophylaxis with Protonix.  await insurance approval for  Acute rehab   Discussed with CT surgery team  in AM rounds.
Off pressors  Lopressor in AM
As stated in assessment  Discussed with nurse and physical therapy, patient will have at rest and ambulation SpO2s recorded to determine whether need for oxygen is optimal
Currently requiring pressors  Start Lopressor once off dobutamine and pressors
Lovenox and SCDs for DVT prophylaxis.  Continue GI prophylaxis with Protonix.    Plan for possible discharge to Acute rehab if insurance reeval approves pt to go.  Family does not want pt to go to a subacute facility.  Discussed with CT surgery team and Dr. Hdz in AM rounds.

## 2017-08-16 NOTE — PROGRESS NOTE ADULT - PROBLEM SELECTOR PROBLEM 8
Essential hypertension
Prophylactic measure
Essential hypertension
Oxygen deficit
Prophylactic measure

## 2017-08-17 VITALS
DIASTOLIC BLOOD PRESSURE: 52 MMHG | HEART RATE: 95 BPM | OXYGEN SATURATION: 98 % | TEMPERATURE: 98 F | SYSTOLIC BLOOD PRESSURE: 132 MMHG | RESPIRATION RATE: 18 BRPM

## 2017-08-17 DIAGNOSIS — Z87.891 PERSONAL HISTORY OF NICOTINE DEPENDENCE: ICD-10-CM

## 2017-08-17 DIAGNOSIS — Z86.79 PERSONAL HISTORY OF OTHER DISEASES OF THE CIRCULATORY SYSTEM: ICD-10-CM

## 2017-08-17 DIAGNOSIS — I25.10 ATHEROSCLEROTIC HEART DISEASE OF NATIVE CORONARY ARTERY W/OUT ANGINA PECTORIS: ICD-10-CM

## 2017-08-17 DIAGNOSIS — Z86.39 PERSONAL HISTORY OF OTHER ENDOCRINE, NUTRITIONAL AND METABOLIC DISEASE: ICD-10-CM

## 2017-08-17 PROCEDURE — 36415 COLL VENOUS BLD VENIPUNCTURE: CPT

## 2017-08-17 PROCEDURE — 93308 TTE F-UP OR LMTD: CPT

## 2017-08-17 PROCEDURE — 85027 COMPLETE CBC AUTOMATED: CPT

## 2017-08-17 PROCEDURE — P9016: CPT

## 2017-08-17 PROCEDURE — 97116 GAIT TRAINING THERAPY: CPT

## 2017-08-17 PROCEDURE — 82947 ASSAY GLUCOSE BLOOD QUANT: CPT

## 2017-08-17 PROCEDURE — 84100 ASSAY OF PHOSPHORUS: CPT

## 2017-08-17 PROCEDURE — 80048 BASIC METABOLIC PNL TOTAL CA: CPT

## 2017-08-17 PROCEDURE — 86905 BLOOD TYPING RBC ANTIGENS: CPT

## 2017-08-17 PROCEDURE — 86900 BLOOD TYPING SEROLOGIC ABO: CPT

## 2017-08-17 PROCEDURE — 94640 AIRWAY INHALATION TREATMENT: CPT

## 2017-08-17 PROCEDURE — 86880 COOMBS TEST DIRECT: CPT

## 2017-08-17 PROCEDURE — 97163 PT EVAL HIGH COMPLEX 45 MIN: CPT

## 2017-08-17 PROCEDURE — 80162 ASSAY OF DIGOXIN TOTAL: CPT

## 2017-08-17 PROCEDURE — 83735 ASSAY OF MAGNESIUM: CPT

## 2017-08-17 PROCEDURE — 94660 CPAP INITIATION&MGMT: CPT

## 2017-08-17 PROCEDURE — 84295 ASSAY OF SERUM SODIUM: CPT

## 2017-08-17 PROCEDURE — 94760 N-INVAS EAR/PLS OXIMETRY 1: CPT

## 2017-08-17 PROCEDURE — 84134 ASSAY OF PREALBUMIN: CPT

## 2017-08-17 PROCEDURE — 86870 RBC ANTIBODY IDENTIFICATION: CPT

## 2017-08-17 PROCEDURE — 85730 THROMBOPLASTIN TIME PARTIAL: CPT

## 2017-08-17 PROCEDURE — 82330 ASSAY OF CALCIUM: CPT

## 2017-08-17 PROCEDURE — 83605 ASSAY OF LACTIC ACID: CPT

## 2017-08-17 PROCEDURE — 93005 ELECTROCARDIOGRAM TRACING: CPT

## 2017-08-17 PROCEDURE — 86922 COMPATIBILITY TEST ANTIGLOB: CPT

## 2017-08-17 PROCEDURE — 86901 BLOOD TYPING SEROLOGIC RH(D): CPT

## 2017-08-17 PROCEDURE — 84484 ASSAY OF TROPONIN QUANT: CPT

## 2017-08-17 PROCEDURE — 82803 BLOOD GASES ANY COMBINATION: CPT

## 2017-08-17 PROCEDURE — 85014 HEMATOCRIT: CPT

## 2017-08-17 PROCEDURE — P9045: CPT

## 2017-08-17 PROCEDURE — 80053 COMPREHEN METABOLIC PANEL: CPT

## 2017-08-17 PROCEDURE — C1889: CPT

## 2017-08-17 PROCEDURE — 82435 ASSAY OF BLOOD CHLORIDE: CPT

## 2017-08-17 PROCEDURE — 86850 RBC ANTIBODY SCREEN: CPT

## 2017-08-17 PROCEDURE — 94002 VENT MGMT INPAT INIT DAY: CPT

## 2017-08-17 PROCEDURE — 99231 SBSQ HOSP IP/OBS SF/LOW 25: CPT

## 2017-08-17 PROCEDURE — 97110 THERAPEUTIC EXERCISES: CPT

## 2017-08-17 PROCEDURE — 84132 ASSAY OF SERUM POTASSIUM: CPT

## 2017-08-17 PROCEDURE — 71045 X-RAY EXAM CHEST 1 VIEW: CPT

## 2017-08-17 PROCEDURE — 82550 ASSAY OF CK (CPK): CPT

## 2017-08-17 PROCEDURE — 97530 THERAPEUTIC ACTIVITIES: CPT

## 2017-08-17 PROCEDURE — 36430 TRANSFUSION BLD/BLD COMPNT: CPT

## 2017-08-17 PROCEDURE — 85610 PROTHROMBIN TIME: CPT

## 2017-08-17 RX ADMIN — Medication 0.12 MILLIGRAM(S): at 05:56

## 2017-08-17 RX ADMIN — Medication 2: at 11:38

## 2017-08-17 RX ADMIN — Medication 100 MILLIGRAM(S): at 15:05

## 2017-08-17 RX ADMIN — Medication 100 MILLIGRAM(S): at 05:57

## 2017-08-17 RX ADMIN — SODIUM CHLORIDE 3 MILLILITER(S): 9 INJECTION INTRAMUSCULAR; INTRAVENOUS; SUBCUTANEOUS at 05:55

## 2017-08-17 RX ADMIN — METFORMIN HYDROCHLORIDE 850 MILLIGRAM(S): 850 TABLET ORAL at 08:37

## 2017-08-17 RX ADMIN — Medication 100 MILLIGRAM(S): at 17:38

## 2017-08-17 RX ADMIN — Medication 81 MILLIGRAM(S): at 11:38

## 2017-08-17 RX ADMIN — METFORMIN HYDROCHLORIDE 850 MILLIGRAM(S): 850 TABLET ORAL at 17:38

## 2017-08-17 RX ADMIN — MAGNESIUM OXIDE 400 MG ORAL TABLET 400 MILLIGRAM(S): 241.3 TABLET ORAL at 08:37

## 2017-08-17 RX ADMIN — Medication 100 MILLIGRAM(S): at 05:56

## 2017-08-17 RX ADMIN — PANTOPRAZOLE SODIUM 40 MILLIGRAM(S): 20 TABLET, DELAYED RELEASE ORAL at 05:56

## 2017-08-17 RX ADMIN — SODIUM CHLORIDE 3 MILLILITER(S): 9 INJECTION INTRAMUSCULAR; INTRAVENOUS; SUBCUTANEOUS at 15:02

## 2017-08-17 RX ADMIN — Medication 40 MILLIGRAM(S): at 05:57

## 2017-08-17 NOTE — PROGRESS NOTE ADULT - PROBLEM SELECTOR PROBLEM 3
Cardiogenic shock
Chronic atrial fibrillation
Type 2 diabetes mellitus without complication, with long-term current use of insulin
Cardiogenic shock

## 2017-08-17 NOTE — PROGRESS NOTE ADULT - PROBLEM SELECTOR PROBLEM 1
Coronary artery disease involving native coronary artery of native heart without angina pectoris

## 2017-08-17 NOTE — PROGRESS NOTE ADULT - PROBLEM SELECTOR PLAN 1
Now s/p CABG x 3  Continue ASA and Lipitor.  Lopressor 100mg BID  for control for chronic a-fib.  Encourage CDBE/IS and increased mobilization.  Shower today.  Start daily Lasix for fluid overload.  Change Oxycodone to Percocet PRN for pain control.
Now s/p CABG x 3  Continue ASA, Lopressor, and Lipitor.  Encourage CDBE/IS and increased mobilization.  Shower today.  Continue daily Lasix for fluid overload.  Percocet PRN for pain control.
Now s/p CABG x 3  Continue ASA, Lopressor, and Lipitor.  Encourage CDBE/IS and increased mobilization.  Shower today.  Continue daily Lasix for fluid overload.  Percocet PRN for pain control.
Now s/p CABG x 3  Continue ASA, Lopressor, and Lipitor.  Encourage CDBE/IS and increased mobilization.  Shower today.  Start daily Lasix for fluid overload.  Percocet PRN for pain control.
Now s/p CABG x 3  Continue ASA, Lopressor, and Lipitor.  Lopressor 100mg BID  for control for chronic a-fib.  Encourage CDBE/IS and increased mobilization.  Shower today.  Start daily Lasix for fluid overload.  Change Oxycodone to Percocet PRN for pain control.
Now s/p CABG x 3  Continue ASA, Lopressor, and Lipitor.  Lopressor increased to 100mg BID yesterday for control for chronic a-fib.  May consider transition to Atenolol if increased rate persists.  Encourage CDBE/IS and increased mobilization.  Shower today.  PRN diuretics for fluid overload.  Change Oxycodone to Percocet PRN for pain control.
s/p CABG x 3
s/p CABG x 3  ASA statin CAD  increase  lopressor for optimal  rate control for chronic a-fib  backround diuretics fluid overload  replete potassium / magnesium
s/p CABG x 3  ASA statin CAD  increase lopressor for optimal  rate control for chronic a-fib  PRN diuretics for fluid overload  replete potassium / magnesium
s/p CABG x 3  Continue ASA, Lopressor, and Lipitor  Encourage CDBE/IS and increased mobilization  Continue daily Lasix for fluid overload  Percocet PRN for pain control
s/p CABG x 3  Continue ASA, Lopressor, and Lipitor.  Encourage CDBE/IS and increased mobilization.  Continue daily Lasix for fluid overload.  Percocet PRN for pain control.
s/p CABG x 3  neurologically intact  hemodynamically stable  amiodarone d/c'd and lopressor restarted for rate control for chronic a-fib  increase activity as tolerated  diet as tolerated  stable for transfer to floor
s/p CABG x 3

## 2017-08-17 NOTE — PROGRESS NOTE ADULT - ASSESSMENT
DM2 in pt s/p CABG with good recoevry.  Discussed with patient to follow up on his diabetes as outpatient.  Continue taking  mg BID.  Check Bgs at home 3 x day   plan discussed w patient.

## 2017-08-17 NOTE — PROGRESS NOTE ADULT - PROBLEM SELECTOR PLAN 7
Continue GI prophylaxis with Protonix.  Denied by insurance for AR and KATHRYN. Plan to discharge home today when oxygen is delivered.  Discussed with CT surgery team and Dr. Hdz in AM rounds.

## 2017-08-17 NOTE — PROGRESS NOTE ADULT - PROVIDER SPECIALTY LIST ADULT
CT Surgery
Endocrinology
Endocrinology
Rehab Medicine

## 2017-08-17 NOTE — PROGRESS NOTE ADULT - SUBJECTIVE AND OBJECTIVE BOX
INTERVAL HPI/OVERNIGHT EVENTS:  Follow for diabetes. Pt recovering well , preparing to be discharged.     MEDICATIONS  (STANDING):  docusate sodium 100 milliGRAM(s) Oral three times a day  dextrose 50% Injectable 50 milliLiter(s) IV Push every 15 minutes  dextrose 50% Injectable 25 milliLiter(s) IV Push every 15 minutes  digoxin     Tablet 0.125 milliGRAM(s) Oral daily  sodium chloride 0.9% lock flush 3 milliLiter(s) IV Push every 8 hours  pantoprazole    Tablet 40 milliGRAM(s) Oral before breakfast  aspirin enteric coated 81 milliGRAM(s) Oral daily  atorvastatin 40 milliGRAM(s) Oral at bedtime  metoprolol 100 milliGRAM(s) Oral two times a day  furosemide    Tablet 40 milliGRAM(s) Oral daily  metFORMIN 850 milliGRAM(s) Oral two times a day with meals  insulin lispro (HumaLOG) corrective regimen sliding scale   SubCutaneous three times a day before meals  magnesium oxide 400 milliGRAM(s) Oral with breakfast    MEDICATIONS  (PRN):  benzonatate 100 milliGRAM(s) Oral three times a day PRN Cough  benzocaine 15 mG/menthol 3.6 mG Lozenge 1 Lozenge Oral two times a day PRN Sore Throat      Allergies  Allergy Status Unknown    Intolerances  epinephrine (Other)  OHS pt (Unknown)  OHS PT (Other)      Review of systems:    Vital Signs Last 24 Hrs  T(C): 36.6 (17 Aug 2017 10:00), Max: 37.1 (16 Aug 2017 17:33)  T(F): 97.9 (17 Aug 2017 10:00), Max: 98.7 (16 Aug 2017 17:33)  HR: 86 (17 Aug 2017 10:00) (86 - 109)  BP: 134/54 (17 Aug 2017 10:00) (105/58 - 134/54)  BP(mean): --  RR: 16 (17 Aug 2017 10:00) (16 - 20)  SpO2: 98% (17 Aug 2017 10:00) (95% - 98%)    PHYSICAL EXAM:    Constitutional: NAD, well-groomed, well-developed  HEENT: PERRLA, EOMI, no exophalmos  Neck: No LAD, No JVD, trachea midline, no thyroid enlargement  Respiratory: CTAB  Cardiovascular: S1 and S2, RRR, no M/G/R  Gastrointestinal: BS+, soft, no organomeglag or mass  Extremities: No peripheral edema, no pedal lesions  Vascular: 2+ peripheral pulses  Neurological: A/O x 3, no focal deficits  Psychiatric: Normal mood, normal affect  Musculoskeletal: 5/5 strength b/l upper and lower extremities      LABS:                        12.4   11.1  )-----------( 272      ( 16 Aug 2017 07:35 )             37.5     08-16    138  |  95<L>  |  31.0<H>  ----------------------------<  138<H>  3.8   |  29.0  |  0.85    Ca    8.7      16 Aug 2017 07:35  Mg     1.7     08-16    TPro  6.9  /  Alb  3.9  /  TBili  1.0  /  DBili  x   /  AST  26  /  ALT  21  /  AlkPhos  92  08-16          CAPILLARY BLOOD GLUCOSE  185 (17 Aug 2017 11:10)  130 (17 Aug 2017 08:00)  144 (16 Aug 2017 20:00)  128 (16 Aug 2017 17:38)  174 (16 Aug 2017 11:47)  161 (16 Aug 2017 07:34)  190 (15 Aug 2017 22:53)  152 (15 Aug 2017 17:38)  134 (15 Aug 2017 11:44)  160 (15 Aug 2017 07:30)  135 (14 Aug 2017 17:00)  182 (14 Aug 2017 11:35)  142 (14 Aug 2017 07:35)  135 (13 Aug 2017 22:21)  135 (13 Aug 2017 22:13)  151 (13 Aug 2017 17:09)      RADIOLOGY & ADDITIONAL TESTS:

## 2017-08-17 NOTE — PROGRESS NOTE ADULT - PROBLEM SELECTOR PLAN 6
Last Prealbumin 10 .  Glucerna shakes added TID with meals.  Encourage PO intake.
Last Prealbumin 10 .  Glucerna shakes added TID with meals.  Encourage PO intake.
Lovenox and SCDs for DVT prophylaxis.  Continue GI prophylaxis with Protonix.    Plan for discharge to Acute rehab when authorization obtained and bed available.  Discussed with CT surgery team and Dr. Staples in AM rounds, as well as Dr. Watters.
Lovenox and SCDs for DVT prophylaxis.  Continue GI prophylaxis with Protonix.    Plan for discharge to Acute rehab when authorization obtained and bed available.  Discussed with CT surgery team and Dr. Staples in AM rounds.
Lovenox and SCDs for DVT prophylaxis.  Continue GI prophylaxis with Protonix.    Plan for discharge to Acute rehab when authorization obtained and bed available.  Discussed with CT surgery team and Dr. Staples in AM rounds.
Lovenox and SCDs for DVT prophylaxis.  Continue GI prophylaxis with Protonix.    Plan for discharge to Acute rehab when authorization obtained and bed available.  Discussed with CT surgery team and Dr. Watters in AM rounds.
Lovenox and SCDs for DVT prophylaxis.  Continue GI prophylaxis with Protonix.    Plan for discharge to Acute rehab when authorization obtained and bed available.  Discussed with CT surgery team in AM rounds.
Maximize beta blocker for blood pressure control
Prealbumin 10 this AM.  Glucerna shakes added TID with meals  Encourage PO intake.
Prealbumin 10 this AM.  Glucerna shakes added TID with meals.  Encourage PO intake.
Prealbumin 10 this AM.  Glucerna shakes added TID with meals.  Encourage PO intake.
Tight glucose control  Continue Premeal  LASHANDA ACHS  Transition to Lantus
betablockers as above
betablockers as above
Tight glucose control  Titrate insulin and add premeal as required by CTICU insulin protocol

## 2017-08-17 NOTE — PROGRESS NOTE ADULT - PROBLEM SELECTOR PLAN 4
Continue Plavix
Continue beta blocker for blood pressure control.
endocrine consult appreciated  continue lantus, premeal humalog and sliding scale coverage  FS AC+HS
endocrine consult appreciated  continue lantus, premeal humalog and sliding scale coverage  resume metformin  FS AC+HS
endocrine consult appreciated  continue sliding scale coverage  Continue metformin as tolerated by creatinine  FS AC+HS
Continue Plavix

## 2017-08-17 NOTE — PROGRESS NOTE ADULT - PROBLEM SELECTOR PLAN 5
Suspected Tracheal bronchitis.  Add Zithromax and Tessalon pearls.  CXR in AM.
Suspected Tracheal bronchitis.  Cough improved today.  Continue Zithromax and Tessalon pearls.  CXR in AM.
Suspected Tracheobronchitis.  Cough improved today.  Continue Zithromax and Tessalon pearls.  CXR in AM.
Suspected Tracheobronchitis.  Cough improved.  Complete Zithromax and continue prn Tessalon pearls.  CXR in AM.
Suspected Tracheobronchitis.  Cough improved.  Completed course of  Zithromax.   Continue PRN Cepacol for sore throat.   CXR in AM.
Suspected Tracheobronchitis.  Cough improved.  Completed course of  Zithromax.   Continue PRN Tessalon pearls.  CXR in AM.
Suspected Tracheobronchitis.  Cough improved.  Completed course of  Zithromax.   Continue PRN Tessalon pearls.  CXR in AM.
Suspected Tracheobronchitis.  Cough improved.  Continue Zithromax and Tessalon pearls.  CXR in AM.
interrogated post-op
interrogated post-op
interrogated post-op and returned to usual settings
s/p FEDERICO clip  Discuss need to restart Coumadin
s/p FEDERICO clip  Discuss need for coumadin with Dr. Watters

## 2017-08-17 NOTE — PROGRESS NOTE ADULT - PROBLEM SELECTOR PLAN 3
Dobutamine now off.  Off pressors  Resolved
Endocrine consult appreciated  Continue LASHANDA AC/HS.  Continue Metformin as tolerated by creatinine.  Consistent Carb diet.
Endocrine following.  Continue LASHANDA AC/HS.  Continue Metformin as tolerated by creatinine.  Consistent Carb diet.
s/p FEDERICO clip  Coumadin daily per INR
Titrate Dobutamine as tolerated  Maintain nader trac in place   Strict Is/Os

## 2017-08-17 NOTE — PROGRESS NOTE ADULT - PROBLEM SELECTOR PROBLEM 6
Essential hypertension
Malnutrition
Prophylactic measure
Type 2 diabetes mellitus without complication, with long-term current use of insulin
Type 2 diabetes mellitus without complication, with long-term current use of insulin

## 2017-08-17 NOTE — PROGRESS NOTE ADULT - PROBLEM SELECTOR PROBLEM 2
Chronic atrial fibrillation
S/P CABG x 3

## 2017-08-17 NOTE — PROGRESS NOTE ADULT - SUBJECTIVE AND OBJECTIVE BOX
Subjective: "I feel good.  My son should be here soon."  Sitting up in bed.  Denies CP or SOB.  NAD noted.      Tele: Afib/paced                         T(F): 97.8 (17 @ 05:50), Max: 98.7 (17 @ 17:33)  HR: 100 (17 @ 05:50) (86 - 109)  BP: 110/60 (17 @ 05:50) (98/58 - 110/60)  RR: 17 (17 @ 05:50) (17 - 20)  SpO2: 95% (17 @ 05:50) (95% - 98%) on 2 liters nasal O2.          Daily     Daily Weight in k.9 (17 Aug 2017 06:40)    LV EF: nml    Allergy Status Unknown  epinephrine (Other)  OHS pt (Unknown)  [facility] PT (Other)          138  |  95<L>  |  31.0<H>  ----------------------------<  138<H>  3.8   |  29.0  |  0.85    Ca    8.7      16 Aug 2017 07:35  Mg     1.7         TPro  6.9  /  Alb  3.9  /  TBili  1.0  /  DBili  x   /  AST  26  /  ALT  21  /  AlkPhos  92                                 12.4   11.1  )-----------( 272      ( 16 Aug 2017 07:35 )             37.5        PT/INR - ( 16 Aug 2017 07:35 )   PT: 29.0 sec;   INR: 2.59 ratio                CAPILLARY BLOOD GLUCOSE  144 (16 Aug 2017 20:00)  128 (16 Aug 2017 17:38)  174 (16 Aug 2017 11:47)               CXR: NA    I&O's Detail    16 Aug 2017 07:01  -  17 Aug 2017 07:00  --------------------------------------------------------  IN:    Oral Fluid: 720 mL  Total IN: 720 mL    OUT:    Voided: 1225 mL  Total OUT: 1225 mL    Total NET: -505 mL         Active Medications:  docusate sodium 100 milliGRAM(s) Oral three times a day  dextrose 50% Injectable 50 milliLiter(s) IV Push every 15 minutes  dextrose 50% Injectable 25 milliLiter(s) IV Push every 15 minutes  digoxin     Tablet 0.125 milliGRAM(s) Oral daily  sodium chloride 0.9% lock flush 3 milliLiter(s) IV Push every 8 hours  pantoprazole    Tablet 40 milliGRAM(s) Oral before breakfast  aspirin enteric coated 81 milliGRAM(s) Oral daily  atorvastatin 40 milliGRAM(s) Oral at bedtime  metoprolol 100 milliGRAM(s) Oral two times a day  benzonatate 100 milliGRAM(s) Oral three times a day PRN  furosemide    Tablet 40 milliGRAM(s) Oral daily  metFORMIN 850 milliGRAM(s) Oral two times a day with meals  insulin lispro (HumaLOG) corrective regimen sliding scale   SubCutaneous three times a day before meals  benzocaine 15 mG/menthol 3.6 mG Lozenge 1 Lozenge Oral two times a day PRN  magnesium oxide 400 milliGRAM(s) Oral with breakfast      Physical Exam:    Neuro: AAOX3.  No focal deficits.    Pulm: Diminished BLL.    CV: Irregular.  +S1+S2.    Abd: Soft/NT/ND.  +BS.  +BM 8/16 per pt.    Extremities: Trace edema BLE.  +pp.      Incision(s): MSI LUCY and without erythema or drainage.  Sternum stable.  RLE EVH site LUCY and healing well.                     PAST MEDICAL & SURGICAL HISTORY:  Stroke syndrome  CAD (coronary artery disease)  LEON (dyspnea on exertion)  Myocardial ischemia  Fatigue  Hypertension  Stented coronary artery: ELLIE x 1  () -- [facility]  Arthritis  Trigger finger  Pacemaker  HTN (hypertension)  Diabetes mellitus  Afib  Status post trigger finger release: Multiple in the past  S/P angioplasty with stent  Pacemaker:  Subjective: "I feel good.  My son should be here soon."  Sitting up in bed.  Denies CP or SOB.  NAD noted.      Tele: Afib/paced                         T(F): 97.8 (17 @ 05:50), Max: 98.7 (17 @ 17:33)  HR: 100 (17 @ 05:50) (86 - 109)  BP: 110/60 (17 @ 05:50) (98/58 - 110/60)  RR: 17 (17 @ 05:50) (17 - 20)  SpO2: 95% (17 @ 05:50) (95% - 98%) on 2 liters nasal O2.  *80% on room air at rest          Daily     Daily Weight in k.9 (17 Aug 2017 06:40)    LV EF: nml    Allergy Status Unknown  epinephrine (Other)  OHS pt (Unknown)  [facility]HS PT (Other)          138  |  95<L>  |  31.0<H>  ----------------------------<  138<H>  3.8   |  29.0  |  0.85    Ca    8.7      16 Aug 2017 07:35  Mg     1.7         TPro  6.9  /  Alb  3.9  /  TBili  1.0  /  DBili  x   /  AST  26  /  ALT  21  /  AlkPhos  92                                 12.4   11.1  )-----------( 272      ( 16 Aug 2017 07:35 )             37.5        PT/INR - ( 16 Aug 2017 07:35 )   PT: 29.0 sec;   INR: 2.59 ratio                CAPILLARY BLOOD GLUCOSE  144 (16 Aug 2017 20:00)  128 (16 Aug 2017 17:38)  174 (16 Aug 2017 11:47)               CXR: NA    I&O's Detail    16 Aug 2017 07:01  -  17 Aug 2017 07:00  --------------------------------------------------------  IN:    Oral Fluid: 720 mL  Total IN: 720 mL    OUT:    Voided: 1225 mL  Total OUT: 1225 mL    Total NET: -505 mL         Active Medications:  docusate sodium 100 milliGRAM(s) Oral three times a day  dextrose 50% Injectable 50 milliLiter(s) IV Push every 15 minutes  dextrose 50% Injectable 25 milliLiter(s) IV Push every 15 minutes  digoxin     Tablet 0.125 milliGRAM(s) Oral daily  sodium chloride 0.9% lock flush 3 milliLiter(s) IV Push every 8 hours  pantoprazole    Tablet 40 milliGRAM(s) Oral before breakfast  aspirin enteric coated 81 milliGRAM(s) Oral daily  atorvastatin 40 milliGRAM(s) Oral at bedtime  metoprolol 100 milliGRAM(s) Oral two times a day  benzonatate 100 milliGRAM(s) Oral three times a day PRN  furosemide    Tablet 40 milliGRAM(s) Oral daily  metFORMIN 850 milliGRAM(s) Oral two times a day with meals  insulin lispro (HumaLOG) corrective regimen sliding scale   SubCutaneous three times a day before meals  benzocaine 15 mG/menthol 3.6 mG Lozenge 1 Lozenge Oral two times a day PRN  magnesium oxide 400 milliGRAM(s) Oral with breakfast      Physical Exam:    Neuro: AAOX3.  No focal deficits.    Pulm: Diminished BLL.    CV: Irregular.  +S1+S2.    Abd: Soft/NT/ND.  +BS.  +BM 8/16 per pt.    Extremities: Trace edema BLE.  +pp.      Incision(s): MSI LUCY and without erythema or drainage.  Sternum stable.  RLE EVH site LUCY and healing well.                     PAST MEDICAL & SURGICAL HISTORY:  Stroke syndrome  CAD (coronary artery disease)  LEON (dyspnea on exertion)  Myocardial ischemia  Fatigue  Hypertension  Stented coronary artery: ELLIE x 1  () -- [facility]  Arthritis  Trigger finger  Pacemaker  HTN (hypertension)  Diabetes mellitus  Afib  Status post trigger finger release: Multiple in the past  S/P angioplasty with stent  Pacemaker:

## 2017-08-17 NOTE — PROGRESS NOTE ADULT - PROBLEM SELECTOR PROBLEM 5
Chronic atrial fibrillation
Pacemaker
R/O Bronchitis
Chronic atrial fibrillation

## 2017-08-17 NOTE — PROGRESS NOTE ADULT - PROBLEM SELECTOR PROBLEM 4
Essential hypertension
Stented coronary artery
Type 2 diabetes mellitus without complication, with long-term current use of insulin
Type 2 diabetes mellitus without complication, with long-term current use of insulin
Essential hypertension
Type 2 diabetes mellitus without complication, with long-term current use of insulin
Stented coronary artery

## 2017-08-21 PROBLEM — I25.10 CAD (CORONARY ARTERY DISEASE): Status: ACTIVE | Noted: 2017-08-21

## 2017-08-21 PROBLEM — Z87.891 FORMER SMOKER: Status: ACTIVE | Noted: 2017-08-21

## 2017-08-21 PROBLEM — Z86.39 HISTORY OF DIABETES MELLITUS: Status: RESOLVED | Noted: 2017-08-21 | Resolved: 2017-08-21

## 2017-08-21 PROBLEM — Z86.39 HISTORY OF HYPERCHOLESTEROLEMIA: Status: RESOLVED | Noted: 2017-08-21 | Resolved: 2017-08-21

## 2017-08-21 PROBLEM — Z86.79 HISTORY OF ATRIAL FIBRILLATION: Status: RESOLVED | Noted: 2017-08-21 | Resolved: 2017-08-21

## 2017-08-21 RX ORDER — METFORMIN HYDROCHLORIDE 850 MG/1
850 TABLET, COATED ORAL
Refills: 0 | Status: ACTIVE | COMMUNITY

## 2017-08-21 RX ORDER — MULTIVIT-MIN/IRON/FOLIC ACID/K 18-600-40
400 CAPSULE ORAL
Refills: 0 | Status: ACTIVE | COMMUNITY

## 2017-08-21 RX ORDER — OXYCODONE HYDROCHLORIDE AND ACETAMINOPHEN 5; 325 MG/1; MG/1
5-325 TABLET ORAL
Refills: 0 | Status: ACTIVE | COMMUNITY

## 2017-08-21 RX ORDER — WARFARIN SODIUM 5 MG/1
5 TABLET ORAL
Refills: 0 | Status: ACTIVE | COMMUNITY

## 2017-08-21 RX ORDER — BENZONATATE 100 MG/1
100 CAPSULE ORAL
Refills: 0 | Status: ACTIVE | COMMUNITY

## 2017-08-21 RX ORDER — FUROSEMIDE 40 MG/1
40 TABLET ORAL
Refills: 0 | Status: ACTIVE | COMMUNITY

## 2017-08-21 RX ORDER — ASPIRIN 81 MG
81 TABLET, DELAYED RELEASE (ENTERIC COATED) ORAL
Refills: 0 | Status: ACTIVE | COMMUNITY

## 2017-08-21 RX ORDER — PANTOPRAZOLE SODIUM 40 MG/1
40 GRANULE, DELAYED RELEASE ORAL
Refills: 0 | Status: ACTIVE | COMMUNITY

## 2017-08-21 RX ORDER — DIGOXIN 125 UG/1
125 TABLET ORAL
Refills: 0 | Status: ACTIVE | COMMUNITY

## 2017-08-21 RX ORDER — ATORVASTATIN CALCIUM 40 MG/1
40 TABLET, FILM COATED ORAL
Refills: 0 | Status: ACTIVE | COMMUNITY

## 2017-08-21 RX ORDER — METOPROLOL TARTRATE 100 MG/1
100 TABLET ORAL
Refills: 0 | Status: ACTIVE | COMMUNITY

## 2017-09-05 ENCOUNTER — APPOINTMENT (OUTPATIENT)
Dept: CARDIOTHORACIC SURGERY | Facility: CLINIC | Age: 82
End: 2017-09-05
Payer: MEDICARE

## 2017-09-05 VITALS
SYSTOLIC BLOOD PRESSURE: 121 MMHG | DIASTOLIC BLOOD PRESSURE: 78 MMHG | HEIGHT: 64 IN | HEART RATE: 80 BPM | OXYGEN SATURATION: 100 % | WEIGHT: 145 LBS | BODY MASS INDEX: 24.75 KG/M2 | RESPIRATION RATE: 16 BRPM

## 2017-09-05 PROCEDURE — 99024 POSTOP FOLLOW-UP VISIT: CPT

## 2017-09-11 NOTE — ASSESSMENT
[FreeTextEntry1] : Very pleasant patient following coronary artery bypass graftingx 3 and left atrial clipping on 8/2/2017.  The patient is currently at home doing very well. Patient reports significant improvement over the last few weeks. The patient is becoming more active, and beginning to exercise. Patient reports feeling fatigued and mild shortness of breath, and some incisional discomfort, but reports that this is getting better over the last few days. On exam all the vital signs are stable. Lungs are clear. The heart sounds are normal. All the incisions are healing nicely.   The sternum is stable, without any evidence of infection. I have advised the patient to continue with physical activity, and to follow up with you for the rest of the cardiovascular care. Thank you for the opportunity to participate in the care of your patient. Please do not hesitate to call me if I can be of further assistance.

## 2017-09-11 NOTE — CONSULT LETTER
[Dear  ___] : Dear  [unfilled], [Courtesy Letter:] : I had the pleasure of seeing your patient, [unfilled], in my office today. [Please see my note below.] : Please see my note below. [Consult Closing:] : Thank you very much for allowing me to participate in the care of this patient.  If you have any questions, please do not hesitate to contact me. [Sincerely,] : Sincerely, [FreeTextEntry2] : Dr. Nick Aldana\par [address] [place] [address] \[place] [address] [Fede Watters MD] : Fede Watters MD [Chief] : Chief [[facility]] : [facility]

## 2017-09-11 NOTE — PHYSICAL EXAM
[Auscultation Breath Sounds / Voice Sounds] : lungs were clear to auscultation bilaterally [Apical Impulse] : the apical impulse was normal [Clean] : clean [Dry] : dry [Healing Well] : healing well [No Edema] : no edema [FreeTextEntry1] : 3 liter of oxygen in use

## 2017-09-11 NOTE — REASON FOR VISIT
[Family Member] : family member [de-identified] : CABG x 3 LIMA and Left atrial appendage clipping [de-identified] : 8/2/17

## 2017-10-12 ENCOUNTER — TRANSCRIPTION ENCOUNTER (OUTPATIENT)
Age: 82
End: 2017-10-12

## 2017-11-15 ENCOUNTER — OUTPATIENT (OUTPATIENT)
Dept: OUTPATIENT SERVICES | Facility: HOSPITAL | Age: 82
LOS: 1 days | End: 2017-11-15
Payer: MEDICARE

## 2017-11-15 DIAGNOSIS — Z95.1 PRESENCE OF AORTOCORONARY BYPASS GRAFT: ICD-10-CM

## 2017-11-15 DIAGNOSIS — Z95.5 PRESENCE OF CORONARY ANGIOPLASTY IMPLANT AND GRAFT: ICD-10-CM

## 2018-02-09 ENCOUNTER — EMERGENCY (EMERGENCY)
Facility: HOSPITAL | Age: 83
LOS: 1 days | Discharge: HOME | End: 2018-02-09
Attending: INTERNAL MEDICINE | Admitting: INTERNAL MEDICINE
Payer: MEDICARE

## 2018-02-09 VITALS
DIASTOLIC BLOOD PRESSURE: 78 MMHG | RESPIRATION RATE: 15 BRPM | TEMPERATURE: 98 F | SYSTOLIC BLOOD PRESSURE: 156 MMHG | OXYGEN SATURATION: 99 % | HEART RATE: 84 BPM

## 2018-02-09 VITALS
RESPIRATION RATE: 18 BRPM | DIASTOLIC BLOOD PRESSURE: 98 MMHG | SYSTOLIC BLOOD PRESSURE: 177 MMHG | OXYGEN SATURATION: 98 % | HEART RATE: 134 BPM

## 2018-02-09 PROCEDURE — 90715 TDAP VACCINE 7 YRS/> IM: CPT

## 2018-02-09 PROCEDURE — 99283 EMERGENCY DEPT VISIT LOW MDM: CPT | Mod: 25

## 2018-02-09 PROCEDURE — 93005 ELECTROCARDIOGRAM TRACING: CPT

## 2018-02-09 PROCEDURE — 12011 RPR F/E/E/N/L/M 2.5 CM/<: CPT

## 2018-02-09 PROCEDURE — 90471 IMMUNIZATION ADMIN: CPT

## 2018-02-09 PROCEDURE — 99284 EMERGENCY DEPT VISIT MOD MDM: CPT | Mod: 25

## 2018-02-09 PROCEDURE — 93010 ELECTROCARDIOGRAM REPORT: CPT

## 2018-02-09 RX ORDER — METFORMIN HYDROCHLORIDE 850 MG/1
1 TABLET ORAL
Qty: 0 | Refills: 0 | COMMUNITY

## 2018-02-09 RX ORDER — TETANUS TOXOID, REDUCED DIPHTHERIA TOXOID AND ACELLULAR PERTUSSIS VACCINE, ADSORBED 5; 2.5; 8; 8; 2.5 [IU]/.5ML; [IU]/.5ML; UG/.5ML; UG/.5ML; UG/.5ML
0.5 SUSPENSION INTRAMUSCULAR ONCE
Qty: 0 | Refills: 0 | Status: COMPLETED | OUTPATIENT
Start: 2018-02-09 | End: 2018-02-09

## 2018-02-09 RX ORDER — LOSARTAN POTASSIUM 100 MG/1
1 TABLET, FILM COATED ORAL
Qty: 0 | Refills: 0 | COMMUNITY

## 2018-02-09 RX ORDER — METOPROLOL TARTRATE 50 MG
100 TABLET ORAL ONCE
Qty: 0 | Refills: 0 | Status: COMPLETED | OUTPATIENT
Start: 2018-02-09 | End: 2018-02-09

## 2018-02-09 RX ADMIN — Medication 100 MILLIGRAM(S): at 03:51

## 2018-02-09 RX ADMIN — TETANUS TOXOID, REDUCED DIPHTHERIA TOXOID AND ACELLULAR PERTUSSIS VACCINE, ADSORBED 0.5 MILLILITER(S): 5; 2.5; 8; 8; 2.5 SUSPENSION INTRAMUSCULAR at 03:51

## 2018-02-09 NOTE — ED ADULT NURSE NOTE - OBJECTIVE STATEMENT
85 year old male presents to the ED complaining of laceration. Patient was getting ready for bed, attempted to get clothes out of his dresser and hit his head onto the top drawer. Patient denies loss of consciousness. Denies headache/dizziness. Laceration to the left eyebrow. No eye injury or involvement. Patient denies pain. Bleeding at the site, controlled with guaze. Patient with no other complaints to injury. Denies fall. Patient is on daily coumadin. Refusing CT.

## 2018-02-09 NOTE — ED PROVIDER NOTE - PMH
Afib    Arthritis    CAD (coronary artery disease)    Diabetes mellitus    LEON (dyspnea on exertion)    Fatigue    HTN (hypertension)    Hypertension    Myocardial ischemia    Pacemaker    Stented coronary artery  ELLIE x 1  (2011) -- [facility]  Stroke syndrome    Trigger finger

## 2018-02-09 NOTE — ED PROVIDER NOTE - OBJECTIVE STATEMENT
84 y/o w/m H/O AF he bumped his head on the draw and sustained a laceration, no HA, no LOC, no focal neuro changes, he did no take his Metroprolol this evening

## 2018-02-09 NOTE — ED ADULT NURSE NOTE - PMH
Afib    Arthritis    CAD (coronary artery disease)    Diabetes mellitus    LEON (dyspnea on exertion)    Fatigue    HTN (hypertension)    Hypertension    Myocardial ischemia    Pacemaker    Stented coronary artery  ELLIE x 1  (2011) -- [facility]  Stroke syndrome    Trigger finger

## 2018-02-09 NOTE — ED ADULT TRIAGE NOTE - CHIEF COMPLAINT QUOTE
patient hit left side of face lateral to eye on drawer at home and sustained laceration. NO loc or eye involvement

## 2018-04-30 PROCEDURE — 93798 PHYS/QHP OP CAR RHAB W/ECG: CPT

## 2018-05-07 ENCOUNTER — OUTPATIENT (OUTPATIENT)
Dept: OUTPATIENT SERVICES | Facility: HOSPITAL | Age: 83
LOS: 1 days | End: 2018-05-07
Payer: MEDICARE

## 2018-05-07 PROCEDURE — 93798 PHYS/QHP OP CAR RHAB W/ECG: CPT

## 2018-05-09 ENCOUNTER — EMERGENCY (EMERGENCY)
Facility: HOSPITAL | Age: 83
LOS: 1 days | Discharge: HOME | End: 2018-05-09
Attending: EMERGENCY MEDICINE | Admitting: EMERGENCY MEDICINE
Payer: MEDICARE

## 2018-05-09 VITALS
TEMPERATURE: 98 F | HEIGHT: 65 IN | WEIGHT: 139.99 LBS | OXYGEN SATURATION: 94 % | HEART RATE: 110 BPM | RESPIRATION RATE: 14 BRPM | DIASTOLIC BLOOD PRESSURE: 70 MMHG | SYSTOLIC BLOOD PRESSURE: 125 MMHG

## 2018-05-09 VITALS
SYSTOLIC BLOOD PRESSURE: 107 MMHG | HEART RATE: 109 BPM | DIASTOLIC BLOOD PRESSURE: 68 MMHG | TEMPERATURE: 98 F | OXYGEN SATURATION: 94 % | RESPIRATION RATE: 16 BRPM

## 2018-05-09 LAB
APTT BLD: 39.4 SEC — HIGH (ref 27.5–37.4)
INR BLD: 2.84 RATIO — HIGH (ref 0.88–1.16)
PROTHROM AB SERPL-ACNC: 31.6 SEC — HIGH (ref 9.8–12.7)

## 2018-05-09 PROCEDURE — 30901 CONTROL OF NOSEBLEED: CPT

## 2018-05-09 PROCEDURE — 30901 CONTROL OF NOSEBLEED: CPT | Mod: RT

## 2018-05-09 PROCEDURE — 85730 THROMBOPLASTIN TIME PARTIAL: CPT

## 2018-05-09 PROCEDURE — 85610 PROTHROMBIN TIME: CPT

## 2018-05-09 PROCEDURE — 99284 EMERGENCY DEPT VISIT MOD MDM: CPT

## 2018-05-09 PROCEDURE — 99283 EMERGENCY DEPT VISIT LOW MDM: CPT | Mod: 25

## 2018-05-09 RX ADMIN — Medication 1 TABLET(S): at 13:20

## 2018-05-09 NOTE — ED ADULT NURSE NOTE - OBJECTIVE STATEMENT
patient came in ED with intermittent episode of nosebleed. it started yesterday then it stopped. then today it started at around 06:00am and still bleeds. pt is on Coumadin and the last time INR was checked it is good normal.

## 2018-05-09 NOTE — ED PROVIDER NOTE - OBJECTIVE STATEMENT
pt s a 86yo male with pmhx of a-fib om coumadin, cad, mi, c/o nose bleed x today. pt reports      dr flynn pt s a 84yo male with pmhx of a-fib on coumadin, cad, mi, c/o nose bleed x today. pt reports      dr flynn pt s a 84yo male with pmhx of a-fib on coumadin, cad, mi, c/o nose bleed x today. pt reports he is on coumadin and gets frequent nose bleeds. pt had inr drawn yesterday, WNL. pt advises he applied ice which provided minimal relief.       dr flynn

## 2018-05-09 NOTE — ED PROVIDER NOTE - PROGRESS NOTE DETAILS
packing placed. pt advised to follow up with ent/pmd in 3 days to have packing removed. inr reviewed. will rx augmentin. All questions answered and concerns addressed. pt verbalized understanding and agreement with plan and dx. pt advised to follow up with PMD. pt advised to return to ed for worsenng symptoms including fever, cp, sob. will dc.

## 2018-05-09 NOTE — ED ADULT TRIAGE NOTE - CHIEF COMPLAINT QUOTE
"I have a nosebleed."  pt had nosebleed yesterday, stopped, started again today; is on coumadin, states it was just checked yesterday and "number was good", states his PMD sent him to ED to have nose packed

## 2018-05-09 NOTE — ED ADULT NURSE NOTE - CHPI ED SYMPTOMS NEG
no weakness/no syncope/no chills/no numbness/no change in level of consciousness/no loss of consciousness/no nausea/no fever/no blurred vision/no vomiting

## 2018-05-09 NOTE — ED ADULT NURSE NOTE - PMH
Afib    Arthritis    CAD (coronary artery disease)    Diabetes mellitus    LEON (dyspnea on exertion)    Fatigue    HTN (hypertension)    Hypertension    Myocardial ischemia    Pacemaker    Stented coronary artery  ELLIE x 1  (2011) -- [facility]  Stroke syndrome    Trigger finger

## 2018-05-09 NOTE — ED PROVIDER NOTE - PMH
Afib    Arthritis    CAD (coronary artery disease)    Diabetes mellitus    LEON (dyspnea on exertion)    Fatigue    HTN (hypertension)    Hypertension    Myocardial ischemia    Pacemaker    Stented coronary artery  ELLIE x 1  (2011) -- [facility]  Stroke syndrome    Trigger finger

## 2018-06-01 DIAGNOSIS — Z95.1 PRESENCE OF AORTOCORONARY BYPASS GRAFT: ICD-10-CM

## 2018-10-30 ENCOUNTER — OUTPATIENT (OUTPATIENT)
Dept: OUTPATIENT SERVICES | Facility: HOSPITAL | Age: 83
LOS: 1 days | End: 2018-10-30
Payer: MEDICARE

## 2018-10-30 DIAGNOSIS — C34.10 MALIGNANT NEOPLASM OF UPPER LOBE, UNSPECIFIED BRONCHUS OR LUNG: ICD-10-CM

## 2018-10-30 DIAGNOSIS — R06.02 SHORTNESS OF BREATH: ICD-10-CM

## 2018-10-30 DIAGNOSIS — I27.20 PULMONARY HYPERTENSION, UNSPECIFIED: ICD-10-CM

## 2018-10-30 PROCEDURE — 71046 X-RAY EXAM CHEST 2 VIEWS: CPT | Mod: 26

## 2018-10-30 PROCEDURE — A9540: CPT

## 2018-10-30 PROCEDURE — 78582 LUNG VENTILAT&PERFUS IMAGING: CPT | Mod: 26

## 2018-10-30 PROCEDURE — 78582 LUNG VENTILAT&PERFUS IMAGING: CPT

## 2018-10-30 PROCEDURE — A9567: CPT

## 2018-10-30 PROCEDURE — 71046 X-RAY EXAM CHEST 2 VIEWS: CPT

## 2019-04-29 NOTE — PROGRESS NOTE ADULT - PROBLEM/PLAN-7
Spoke with the patient regarding her outcomes from the genicular nerve blocks on 4/25/2019. She states that she experienced mild relief for short period of time after the procedure.   She does not feel that there is enough improvement to go through a radio DISPLAY PLAN FREE TEXT

## 2019-09-27 NOTE — DISCHARGE NOTE ADULT - NS AS DC FOLLOWUP STROKE INST
Patient:   JULIENNE RENTERIA            MRN: [id number]            FIN: [id number]              Age:   89 years     Sex:  MALE     :  [birth year]   Associated Diagnoses:   None   Author:   NIA, ESTHELA     General surgery progress note  Patient's electrolyte abnormalities are improving and being corrected no leukocytosis soft nontender  both open areas are almost closed no surrounding cellulitis cholecystostomy site and lower lateral portion of incision   Coumadin/Warfarin

## 2020-04-23 NOTE — ASU PREOP CHECKLIST - AS BP NONINV SITE
[facility] Medicine Services  PROGRESS NOTE    Patient Name: Damion Llanos  : 1953  MRN: [id number]    Date of Admission: 2020  Primary Care Physician: Konstantin Palm MD    Subjective   Subjective     CC:  Follow up abdominal pain    HPI:  Patient is sitting up in bed. He has some abdominal pain.     Review of Systems  Gen- No fevers, chills  CV- No chest pain, palpitations  Resp- No cough, dyspnea  GI- No N/V/D, Yes abd pain    Objective   Objective     Vital Signs:   Temp:  [98.8 °F (37.1 °C)-99.5 °F (37.5 °C)] 99 °F (37.2 °C)  Heart Rate:  [72-92] 72  Resp:  [18-24] 24  BP: (101-161)/(58-85) 161/85        Physical Exam  Constitutional: No acute distress, awake, alert, male   HENT: NCAT, mucous membranes moist  Respiratory: Coarse upper airway sounds , respiratory effort normal   Cardiovascular: RRR, no murmurs, rubs, or gallops, palpable pedal pulses bilaterally  Gastrointestinal: Soft, non distended, slightly tender, Ostomy with minimal bloody output  Musculoskeletal: No bilateral ankle edema  Psychiatric: Appropriate affect, cooperative  Neurologic: Oriented x 3, strength symmetric in all extremities, speech clear, tremor of hands   Skin: No rashes      Results Reviewed:  Results from last 7 days   Lab Units 20  0536 20  0559 208 20  0939   WBC 10*3/mm3 8.86  --  14.91* 13.77*   HEMOGLOBIN g/dL 10.9*  --  12.1* 13.0   HEMATOCRIT % 35.0*  --  38.1 40.0   PLATELETS 10*3/mm3 212  --  244 261   INR   --  1.28*  --   --      Results from last 7 days   Lab Units 20  0536 20  0358 20  0939   SODIUM mmol/L 130* 133* 131*   POTASSIUM mmol/L 4.2 4.8 4.7   CHLORIDE mmol/L 95* 95* 94*   CO2 mmol/L 26.0 27.0 25.0   BUN mg/dL 13 11 18   CREATININE mg/dL 0.53* 0.53* 0.75*   GLUCOSE mg/dL 134* 126* 123*   CALCIUM mg/dL 9.0 9.2 9.4   ALT (SGPT) U/L  --   --  18   AST (SGOT) U/L  --   --  20     Estimated Creatinine Clearance: 108.2 mL/min (A)  (by C-G formula based on SCr of 0.53 mg/dL (L)).    Microbiology Results Abnormal     Procedure Component Value - Date/Time    Blood Culture - Blood, Arm, Right [[id number]] Collected:  04/22/20 0601    Lab Status:  Preliminary result Specimen:  Blood from Arm, Right Updated:  04/23/20 0630     Blood Culture No growth at 24 hours    Blood Culture - Blood, Hand, Left [[id number]] Collected:  04/22/20 0601    Lab Status:  Preliminary result Specimen:  Blood from Hand, Left Updated:  04/23/20 0630     Blood Culture No growth at 24 hours          Imaging Results (Last 24 Hours)     Procedure Component Value Units Date/Time    XR Chest 1 View [[id number]] Collected:  04/23/20 1027     Updated:  04/23/20 1122    Narrative:       EXAMINATION: XR CHEST 1 VW- 04/23/2020     INDICATION: dyspnea; R10.9-Unspecified abdominal pain; R11.0-Nausea;  K63.1-Perforation of intestine (nontraumatic); R10.31-Right lower  quadrant pain     COMPARISON: 12/16/2017     FINDINGS: NG tube is seen passing at least to the level of the GE  junction. Heart is enlarged. Vasculature appears cephalized. Mild  diffuse interstitial changes may represent earliest changes of  interstitial edema, but are not extensive. No lung consolidation  effusion or pneumothorax is seen. No subdiaphragmatic free air is  appreciated.       Impression:       Cardiomegaly and perhaps mild pulmonary vascular congestion.     D:  04/23/2020  E:  04/23/2020                 Results for orders placed during the hospital encounter of 12/16/17   Adult Transthoracic Echo Complete W/ Cont if Necessary Per Protocol    Narrative · Left ventricular systolic function is normal. Estimated EF = 65%.  · The cardiac valves are anatomically and functionally normal.          I have reviewed the medications:  Scheduled Meds:    allopurinol 300 mg Oral Daily   atorvastatin 10 mg Oral Nightly   bisacodyl 10 mg Oral Daily   carvedilol 12.5 mg Oral BID With Meals   docusate sodium 100 mg Oral BID      ferrous sulfate 325 mg Oral Every Other Day   hydrALAZINE 50 mg Oral TID   insulin lispro 0-7 Units Subcutaneous TID With Meals   lisinopril 20 mg Oral Daily   LORazepam 0.5 mg Intravenous Q6H   Followed by      [START ON 4/24/2020] LORazepam 0.5 mg Intravenous Q8H   methylnaltrexone 4 mg Subcutaneous Every Other Day   metoclopramide 10 mg Intravenous Q6H   pantoprazole 40 mg Oral Q AM   piperacillin-tazobactam 4.5 g Intravenous Q8H   sodium chloride 10 mL Intravenous Q12H     Continuous Infusions:    HYDROmorphone HCl-NaCl     lactated ringers 75 mL/hr Last Rate: 75 mL/hr (04/23/20 1201)     PRN Meds:.•  acetaminophen **OR** acetaminophen **OR** acetaminophen  •  bisacodyl  •  dextrose  •  dextrose  •  diphenhydrAMINE  •  gabapentin  •  glucagon (human recombinant)  •  HYDROcodone-acetaminophen  •  HYDROmorphone  •  ipratropium-albuterol  •  LORazepam **OR** LORazepam **OR** LORazepam **OR** LORazepam **OR** LORazepam **OR** LORazepam  •  naloxone  •  nicotine  •  ondansetron **OR** ondansetron  •  promethazine  •  sennosides-docusate  •  simethicone  •  sodium chloride  •  sodium chloride    Assessment/Plan   Assessment & Plan     Active Hospital Problems    Diagnosis  POA   • **Perforated bowel (CMS/HCC) [K63.1]  Yes   • Chronic pain [G89.29]  Yes   • Chronic alcohol abuse [F10.10]  Yes   • Hypoxia [R09.02]  Yes   • Dependent edema [R60.9]  Yes   • Tobacco abuse [Z72.0]  Yes   • Primary osteoarthritis of both hips [M16.0]  Yes   • Type 2 diabetes mellitus with complication, without long-term current use of insulin (CMS/HCC) [E11.8]  Yes      Resolved Hospital Problems   No resolved problems to display.        Brief Hospital Course to date:  Damion Llanos is a 66 y.o. male with Hx DM2, HTN, gout, chronic pain with opiates, who presented with acute abdominal pain and unrevealing CT of the abd who worsened overnight and had repeat imaging with free air now s/p emergent laparotomy with perforated bowel  This  patient's problems and plans were partially entered by my partner and updated as appropriate by me 04/23/20.         Assessment/Plan     Perforated cecum s/p exploratory laparotomy  - s/p ex-lap with RT hemicolectomy with end ileostomy and mucous fistula 4/22/20 with Dr. Nugent  - cont pain control  - cont IVFs but decreased rate to 75     Acute hypoxic respiratory insufficieny due to hypoventilation  Chronic tobacco abuse  -down to 2 L NC today   -CXR today shows mild edema  -not currently wheezing but could add nebs if resp worsens      Mild hypovolemic hyponatremia  -Na 130 today   -cont iV fluid      Chronic EtOH use with dependence  - drinks 6-7 beers/night for years  - denies hx of withdrawal, though noncommittal regarding if he has quit for 3 or more days in recent years  - CIWA scoring and PRN ativan; start scheduled ativan today      DM type 2, self reported recent a1c <7%  - on metformin only; accuchecks with SSI     Chronic venous stasis  - on lasix outpatient for edema, recently started using compression stockings which have helped  - holding diuretics while volume resuscitating    Daily Care Communication  Due to current limited visitation policies, an attempt will be made daily to update patient's identified best point-of-contact(s)   Contact: Damion Llanos   Relation: Son    Type of communication (phone or televideo): phone    Time of communication: 1420   Notes (if applicable): Son was happy with update          DVT prophylaxis:  mechanical    Disposition: I expect the patient to be discharged TBD pending post-operative course and improvement in bowel function.    CODE STATUS:   Code Status and Medical Interventions:   Ordered at: 04/21/20 1443     Code Status:    CPR     Medical Interventions (Level of Support Prior to Arrest):    Full     Comments:    Prior         Electronically signed by Azalea Glez DO, 04/23/20, 14:16.       right upper arm

## 2021-09-20 NOTE — ED PROVIDER NOTE - CROS ED MARK PERT SYS NEG
· Present on admission , QTc 508  · EKGs q2h due to toxodrome clinical picture  · Toxicology placing recommendations for magnesium and sodium bicarbonate   · Give Magnesium Sulfate IVPB 2 g x1 on arrival to ICU josh all pertinent systems negative

## 2021-10-02 NOTE — ASU PATIENT PROFILE, ADULT - NSALCOHOLUSE_GEN_A_CORE_FT
You were seen in the emergency department for evaluation of toe pain    Your exam is reassuring. I suspect gout. I am starting you on steroids to help with your symptoms.       While I don't believe you have an emergent illness right now, your illness may progress and sometimes in unanticipated ways. If you feel you are worsening, developing new symptoms, or if you have any other concerns, please follow up with your PCP sooner, or return to the ED for evaluation.     "very rarely"

## 2022-10-13 NOTE — PATIENT PROFILE ADULT. - NUTRITION PROFILE
Patient : Clara Sykes Age: 65 year old Sex: female   MRN: [id number] Encounter Date: 10/13/2022      History     Chief Complaint   Patient presents with   • Headache New Onset on New Symptom     Headache    The patient is a 65-year-old female with a past medical history of hypertension, CVA with residual left-sided hemideficits, ACOM aneurysm status post clipping, history of recurrent visits for headache, who presents to the emergency department today for evaluation of a headache.  Patient ports that she developed a left-sided headache yesterday morning which feels similar to her prior headaches.  The patient reports photophobia as well as blurry vision.  States that she attempted to get Tylenol, however, prescription was never filled at the pharmacy.  No head trauma, loss of consciousness, nausea or vomiting.    Patient asked what she would like for her headache, states that Tylenol usually works.    Surgical history: As above  Social history: Positive tobacco use            No Known Allergies    Current Discharge Medication List      Prior to Admission Medications    Details   SUMAtriptan (Imitrex) 50 MG tablet Take 1 tablet by mouth at onset of migraine. May repeat after 2 hours if needed.  Qty: 30 tablet, Refills: 0      emollient (BIAFINE) cream Apply 1 application topically as needed (dry skin).  Qty: 454 g, Refills: 0      acetaminophen (Tylenol 8 Hour) 650 MG CR tablet Take 1 tablet by mouth every 8 hours as needed for Pain.  Qty: 20 tablet, Refills: 0      lidocaine (LIDOCARE) 4 % patch Place 1 patch onto the skin every 12 hours.  Qty: 15 patch, Refills: 0      amLODIPine (NORVASC) 10 MG tablet Take 1 tablet by mouth daily.  Qty: 30 tablet, Refills: 0      metoPROLOL tartrate (LOPRESSOR) 25 MG tablet Take 1 tablet by mouth every 12 hours.  Qty: 60 tablet, Refills: 0      butalbital-acetaminophen-caffeine (Fioricet) -40 MG capsule Take 1 capsule by mouth every 4 hours as needed (For Headache.).  Qty: 9  capsule, Refills: 0      aspirin (ECOTRIN) 81 MG EC tablet Take 1 tablet by mouth daily.  Qty: 30 tablet, Refills: 0      clopidogrel (PLAVIX) 75 MG tablet Take 1 tablet by mouth daily.  Qty: 30 tablet, Refills: 0      atorvastatin (LIPITOR) 40 MG tablet Take 1 tablet by mouth daily.  Qty: 30 tablet, Refills: 0      permethrin (ACTICIN) 5 % cream       hydroCORTisone (CORTIZONE) 1 % cream Apply topically 2 times daily.  Qty: 30 g, Refills: 0         New Prescriptions    Details   cephalexin (Keflex) 500 MG capsule Take 1 capsule by mouth in the morning and 1 capsule in the evening. Do all this for 5 days.  Qty: 10 capsule, Refills: 0      acetaminophen (Tylenol) 325 MG tablet Take 2 tablets by mouth every 6 hours as needed for Pain.  Qty: 20 tablet, Refills: 0             Past Medical History:   Diagnosis Date   • Arthritis    • Blood clot associated with vein wall inflammation    • Essential (primary) hypertension    • Stroke (CMS/HCC)        Past Surgical History:   Procedure Laterality Date   • BRAIN SURGERY         Family History   Family history unknown: Yes       Social History     Tobacco Use   • Smoking status: Current Every Day Smoker     Packs/day: 0.25   • Smokeless tobacco: Never Used   Substance Use Topics   • Alcohol use: Not Currently   • Drug use: Never       Review of Systems   Constitutional: Negative.    HENT: Negative.    Eyes: Negative.    Respiratory: Negative.    Cardiovascular: Negative.    Gastrointestinal: Negative.    Endocrine: Negative.    Genitourinary: Negative.    Musculoskeletal: Negative.    Skin: Negative.    Allergic/Immunologic: Negative.    Neurological: Positive for headaches.   Hematological: Negative.    Psychiatric/Behavioral: Negative.    All other systems reviewed and are negative.      Physical Exam     ED Triage Vitals   ED Triage Vitals Group      Temp 10/13/22 0343 99 °F (37.2 °C)      Heart Rate 10/13/22 0343 83      Resp 10/13/22 0343 17      BP 10/13/22 0343 (!)  211/125      SpO2 10/13/22 0343 97 %      EtCO2 mmHg --       Height --       Weight --       Weight Scale Used --       BMI (Calculated) --       IBW/kg (Calculated) --        Physical Exam  Vitals reviewed.   Constitutional:       General: She is not in acute distress.     Appearance: She is not diaphoretic.   HENT:      Head: Atraumatic.      Right Ear: External ear normal.      Left Ear: External ear normal.      Nose: Nose normal.   Eyes:      General:         Right eye: No discharge.         Left eye: No discharge.      Conjunctiva/sclera: Conjunctivae normal.   Cardiovascular:      Rate and Rhythm: Normal rate and regular rhythm.   Pulmonary:      Effort: Pulmonary effort is normal.      Breath sounds: Normal breath sounds.   Abdominal:      Palpations: Abdomen is soft.      Tenderness: There is no abdominal tenderness.   Musculoskeletal:         General: Normal range of motion.   Skin:     General: Skin is warm and dry.   Neurological:      General: No focal deficit present.      Mental Status: She is alert and oriented to person, place, and time.      Comments: Postsurgical changes to the left skull, well-healed.  Cranial nerves II through XII are intact.  Finger-nose-finger is intact and symmetric bilaterally.  No meningismus.  Patient answers questions appropriate.         ED Course     Procedures    Lab Results     Results for orders placed or performed during the hospital encounter of 10/13/22   Basic Metabolic Panel   Result Value Ref Range    Fasting Status      Sodium 143 135 - 145 mmol/L    Potassium 3.6 3.4 - 5.1 mmol/L    Chloride 106 97 - 110 mmol/L    Carbon Dioxide 30 21 - 32 mmol/L    Anion Gap 11 7 - 19 mmol/L    Glucose 89 70 - 99 mg/dL    BUN 13 6 - 20 mg/dL    Creatinine 0.70 0.51 - 0.95 mg/dL    Glomerular Filtration Rate >90 >=60    BUN/ Creatinine Ratio 19 7 - 25    Calcium 8.8 8.4 - 10.2 mg/dL   CBC with Automated Differential (performable only)   Result Value Ref Range    WBC 6.1 4.2  - 11.0 K/mcL    RBC 4.56 4.00 - 5.20 mil/mcL    HGB 13.8 12.0 - 15.5 g/dL    HCT 41.2 36.0 - 46.5 %    MCV 90.4 78.0 - 100.0 fl    MCH 30.3 26.0 - 34.0 pg    MCHC 33.5 32.0 - 36.5 g/dL    RDW-CV 14.0 11.0 - 15.0 %    RDW-SD 46.7 39.0 - 50.0 fL     140 - 450 K/mcL    NRBC 0 <=0 /100 WBC    Neutrophil, Percent 48 %    Lymphocytes, Percent 43 %    Mono, Percent 6 %    Eosinophils, Percent 2 %    Basophils, Percent 1 %    Immature Granulocytes 0 %    Absolute Neutrophils 3.0 1.8 - 7.7 K/mcL    Absolute Lymphocytes 2.6 1.0 - 4.0 K/mcL    Absolute Monocytes 0.4 0.3 - 0.9 K/mcL    Absolute Eosinophils  0.1 0.0 - 0.5 K/mcL    Absolute Basophils 0.0 0.0 - 0.3 K/mcL    Absolute Immmature Granulocytes 0.0 0.0 - 0.2 K/mcL   Urinalysis & Reflex Microscopy With Culture If Indicated   Result Value Ref Range    COLOR, URINALYSIS Yellow     APPEARANCE, URINALYSIS Clear     GLUCOSE, URINALYSIS Negative Negative mg/dL    BILIRUBIN, URINALYSIS Negative Negative    KETONES, URINALYSIS Negative Negative mg/dL    SPECIFIC GRAVITY, URINALYSIS 1.025 1.005 - 1.030    OCCULT BLOOD, URINALYSIS Trace (A) Negative    PH, URINALYSIS 6.0 5.0 - 7.0    PROTEIN, URINALYSIS Negative Negative mg/dL    UROBILINOGEN, URINALYSIS 0.2 0.2, 1.0 mg/dL    NITRITE, URINALYSIS Positive (A) Negative    LEUKOCYTE ESTERASE, URINALYSIS Small (A) Negative    SQUAMOUS EPITHELIAL, URINALYSIS 1 to 5 None Seen, 1 to 5 /hpf    ERYTHROCYTES, URINALYSIS 3 to 5 (A) None Seen, 1 to 2 /hpf    LEUKOCYTES, URINALYSIS 6 to 10 (A) None Seen, 1 to 5 /hpf    BACTERIA, URINALYSIS Large (A) None Seen /hpf    HYALINE CASTS, URINALYSIS None Seen None Seen, 1 to 5 /lpf   URINALYSIS, MACROSCOPIC -POINT OF CARE   Result Value Ref Range    COLOR - POINT OF CARE Yellow     APPEARANCE, URINALYSIS - POINT OF CARE Hazy     GLUCOSE, URINALYSIS - POINT OF CARE Negative Negative mg/dL    BILIRUBIN, URINALYSIS - POINT OF CARE Negative Negative    KETONES, URINALYSIS - POINT OF CARE Negative  Negative mg/dL    SPECIFIC GRAVITY, URINALYSIS - POINT OF CARE 1.020 1.005 - 1.030 NULL    OCCULT BLOOD, URINALYSIS - POINT OF CARE Small (A) Negative    PH, URINALYSIS - POINT OF CARE 6.0 5.0 - 7.0 Units    PROTEIN, URINALYSIS - POINT OF CARE Negative Negative mg/dL    UROBILINOGEN, URINALYSIS - POINT OF CARE 0.2 0.2, 1.0 mg/dL    NITRITE, URINALYSIS - POINT OF CARE Positive (A) Negative    WBC ESTERASE, URINALYSIS - POINT OF CARE Small (A) Negative           Radiology Results     Imaging Results          CT HEAD WO CONTRAST (Final result)  Result time 10/13/22 07:17:07    Final result                 Impression:    Findings and Impression:   No sign of intracranial hemorrhage, mass, or acute ischemia.       Lime of Vargas aneurysm clip.  Left frontal and occipital craniotomy  changes.       Right frontotemporal and left anterior temporal unchanged  encephalomalacia.  Chronic lacunar infarct of left zena.  Moderate  leukoariaosis.  Mild overall cerebral atrophy.  Atherosclerosis.       Visualized paranasal sinuses and mastoids clear.    Electronically Signed by: CUONG PARADA M.D.   Signed on: 10/13/2022 7:17 AM                Narrative:    Exam:  CT head without contrast    Indication:  Headache, history of cerebral aneurysm    Comparison:  CT brain from 10/06/2022, CT brain from 09/15/2022    Did a Tele-radiologist issue a preliminary report? No.                                ED Medication Orders (From admission, onward)    Ordered Start     Status Ordering Provider    10/13/22 0652 10/13/22 0653  acetaminophen (TYLENOL) tablet 1,000 mg  ONCE         Last MAR action: Given DANIEL FIERRO  Number of Diagnoses or Management Options  Nonintractable episodic headache, unspecified headache type  Urinary tract infection with hematuria, site unspecified  Diagnosis management comments: Multiple differential diagnoses were considered.  The patient was apprised of diagnostic and treatment options,  including alternate modes of care, in addition to risks and benefits, for this medical condition.  Based on his discussion, the patient with this chosen diagnostic and treatment plan.    65-year-old female with multiple comorbidities, recurrent visits to the ER for headache, who presented today for evaluation of 24 hours of a left-sided headache, which she states feels similar to her prior headaches.    In the emergency department, the patient does initially notably hypertensive, which seemed to resolve with improvement of the pain with Tylenol.    On reevaluation after Tylenol, the patient reports that the headache is improving.  Patient tolerates p.o. intake.    CT head without evidence of acute process.    Laboratory work-up is notable for findings concerning for UTI.  The patient does not endorse any dysuria or frequency.  However, the context of nitrite positive urine and large bacteria, will prescribe Keflex.    Overall, patient is presenting with what appears to be acute on chronic headache/recurrence of prior migraine, doubt meningitis, subarachnoid hemorrhage, dural venous sinus thrombosis, acute angle-closure glaucoma, cervical artery dissection.    Follow-up instructions and return precautions are discussed with the patient.  She understands and is in agreement. Patient is ambulatory with a steady gait prior to discharge.    Impression: 1.  Headache, recurrent  Disposition we will discharge, follow-up as discussed        Clinical Impression     ED Diagnosis   1. Nonintractable episodic headache, unspecified headache type     2. Urinary tract infection with hematuria, site unspecified         Disposition        Discharge 10/13/2022  8:41 AM  Clara Sykes discharge to home/self care.                         Mark Baum MD  10/14/22 0882     no indicators present

## 2023-04-06 NOTE — ED PROVIDER NOTE - ATTENDING CONTRIBUTION TO CARE
[de-identified] : HERSON JOYNER Kwok at [facility]. \par \par He later established care [facility], 
pt on coumadin with epistaxis x 6 hours.  pt on coumadin for Afib.  nose packed in the ED, augmentin, f/u ENT.  INR wnl

## 2023-11-22 NOTE — DISCHARGE NOTE ADULT - FUNCTIONAL SCREEN CURRENT LEVEL: BATHING, MLM
Chief complaint  Back and left leg pain   Bilateral knees pain    History  Ms Felix is back for visit  She has back and left leg pain. Consider surgery and fusion. The pain in the back is deep achy worse in the mid back area.  This is associated with tight muscle bands.  This limits the range of motion of the lumbar spine mainly in forward flexion.  The pain in the back is radiating around the side on the lateral aspect of the left thigh going down toward the lateral aspect of the left leg into the lateral malleolus toward the lateral aspect of the foot towards the left big toe.    This pain down to the left lower limb is more of a burning tingling sensation.  The pain in the left lower limb worsens with bending forward or with any lifting, it improves with laying on the side and resting.  This is similar to the associated pain in the middle to lower back.  Denied any bowel or bladder incontinence.  With the worst pain there is tendency to catch the toe especially on carpeted area.  This occurs mostly when tired and toward the end of the day.    The skin is intact with no breakdown.  No vesicles.    Also knees pain bilaterally worse on R . Considering PRP.  The pain in the right knee is deep achy stabbing.  It is both on the medial and lateral aspects and behind the kneecap.  The knee pain is associated with sensation of crunching upon range of motion and weightbearing.  The pain is continuous at rest associated with stiffness with prolonged sitting and get worse with standing walking or any weightbearing activity.  Occasionally there is knee swelling.  No change in color or texture of the skin.  No pain proximal to the thigh or distal to the leg associated with the knee pain.  With episodes of aggravation of the pain there are occasion of sensation of instability but no falls.       Pain level without medication is 8/10 , with the medication pain level 2/10.     The pain meds are helping control the pain and  improving Activities of Daily living and quality of life and quality of sleep.    opioids treatment agreement 2023  Oarrs pulled and scanned in the chart  no concerns  last urine toxicology testing earlier this year and it was compliant we will repeat  Xray updated spine and knees   ORT Score is  0  Pain pathology and pain generators spine and knees   Modalities tried injection, surgery, physical therapy, TENS unit, nonsteroidal anti-inflammatory medication gel injection in the knees      Denied any fever or chills. No weight loss and no night sweats. No cough or sputum production. No diarrhea   The constipation has been responding to fibers and over the counter medications.     No bladder and bowel incontinence and no other changes in bladder and bowel. No skin changes.  Reports tiredness and fatigability only if the pain is not controlled.   Denied opioids diversion and abuse and denies alcoholism. Denies overuse of the pain medications.    The control of the pain with the pain medications is helping the control of the symptoms and allowing the function and activities of daily living, enjoyment of life, improving the quality of life and sleep with less interruption by the pain. The goal is symptomatic control of the nonmalignant chronic pain and not to repair the permanent damage in the tissues inducing the chronic pain conditions. We are aiming to shift the focus from the nonmalignant chronic pain to other aspects of life by symptomatically treating this chronic pain. If this pain is not treated it will lead to major morbidity and it is also associated with increased risks of mortality. The patient understands those very clearly and also understand high risks of morbidity and mortality if not strictly adherent to the treatment recommendations and reporting any associated side effects. Also patient understand the full responsibility associated with these medications to avoid abuse or overuse or any use of these  medications for anything besides treating the patient's own chronic pain and nothing else under any circumstances.        Physical examination  Awake, alert and oriented for time place and persons   declined Chaperone for the visit and was adequately  draped for the exam.      Examination of the lumbar spine showed tight muscle bands in the mid and lower back area, this is more pronounced on the left compared to the right.  Additionally, this is inducing mild reversal of the lumbar lordosis with functional scoliosis with left-sided concavity.  This scoliosis corrects with  bending of the lumbar spine.     Straight leg raising increased the pain in the back and down the lateral aspect of the left knee onto the lateral leg to the left lateral malleolus and foot.     There is decreased sensory to light touch on the lateral aspect of the thigh and around the left knee going down to the lateral aspect of the leg to the left lateral malleolus into the dorsum of the left foot.   Deep tendon reflexes is present for the patellar tendons bilaterally.  Achilles reflexes are present bilaterally and symmetric.    Medial Hamstrings reflex is decreased on the left compared to the right side.  Plantar cutaneous are down going bilaterally.  Ankle extension are  5/5 bilaterally. Plantar flexion of the ankles are 5/5 bilaterally.  Big toe extension is 4/5 on the left compared to 5/5 on the right side.    Negative Tinel's sign over the left peroneal nerve at the fibular neck.  Liset sign for axial loading and global rotation are negative.  No aberrant pain behavior.      Examination of the right knee showed mild clinical effusion. Normal skin color and texture palpation of the knee showed tenderness over the medial and lateral joint line and the sensation of crepitation upon range of motion.  Range of motion from -2 degrees to 105 degrees. No medial lateral instability. No drawer sign.  Lachman is negative.  Positive Festus both  medially and laterally.  Negative pivot shift.  Homans' sign is negative.  Calves are soft.  Knee flexion and extension is 4/5 and limited by the pain.   Similar findings on the left knee.    Diagnosis  Problem List Items Addressed This Visit       Lumbar spondylosis - Primary    Right lumbar radiculopathy    History of spinal fracture     Other Visit Diagnoses       Long term current use of opiate analgesic                 Plan  Reviewed the pain generators.  Went over the types of pain with neuropathic and nociceptive and different pathologies and therapeutic modalities. Discussed the mechanism of action of interventions from acupuncture, physical therapy , regular exercises, injections, botox, spinal cord stimulation, and role of surgery     Went over pathology of the intervertebral disc displacement and the anatomical relation to the Nerve roots and relation to the radicular symptoms. Went over treatment modalities with conservative treatment including acupuncture   and epidural steroid injection with fluoroscopy guidance and last resort of surgery    Based on the above findings and the clinical response to the opioids medications and improvement of the activities of daily living, sleep, and work performance. We made this complex decision to continue the opioids therapy in light of the evidence of the patient's responsibility in using the pain medications as prescribed for the nonmalignant chronic pain condition. We discussed about the use of the pain medications to treat the symptoms of chronic nonmalignant pain and we are not trying the repair the permanent damage in the tissues, rather we are trying to control the symptoms induced by the permanent damage to the tissues inducing the chronic pain condition and resulting disability. I explained the difference and discussed it with the patient and stressed the importance of knowing the difference especially because of the potential side effects and the potential  addicting effect and habit forming nature of the dangerous drugs we are using to treat the symptoms of the chronic pain.      We discussed that we are prescribing the medications on good darío and legitimate medical reason.     We reviewed the side effects and precautions of opioids prescriptions as discussed in the opioids treatment agreement.    realizes the interaction between the therapeutic classes including the respiratory depression and potential death     Random drug testing twice in 6 months we will submit     Xtampza for long acting and oxycodone for the short acting  She is on Addaral for ADD. She realizes the interaction between the therapeutic classes including the respiratory depression and potential death     For the lumbar spine surgey .dw about the fusion vs replacement and she will discuss with surgeon    Discussed about NSAIDS and I explained about the opioids sparing effect to allow keeping the opioids dose at minimal effective dose.   I went over the potential side effects of the NSAIDS on the gastrointestinal, renal and cardiovascular systems.      I detailed the side effects from the acetaminophen in the medication and made aware of those. I also explained about the cumulative effects on the organs and mainly the liver.     Given the opioids therapy , we discussed about the risk for accidental over dose on the pain medications, either for patient or other household. I went over the mechanism of action and mode of use of the Naloxone according to the  recommendations. I will provide a prescription for a kit.     Follow-up 8 weeks or earlier if needed     The level of clinical decision making in this office visit,  is high, given the high risks of complications with the morbidity and mortality due to the fact that acute and chronic pain may pose a threat to life and bodily function, if under treated, poorly treated, or with failure to maintain adequate treatment and timely medical  follow up. Additionally over treatment has its own set of complications including overdosing on the pain medications and also the habit forming potentials with the use of the medications used to treat chronic painful conditions including therapeutic classes classified as dangerous medications. Given the serious and fluctuating nature of pain level and instensity with extensive consideration for whenever pain changes, there is always the risk of prolonged functional impairment requiring close patient monitoring with regular assessments and reassessments and high level medical decision making at every office visit. The amount and complexity of data reviewed is high given the patient clinical presentation, labs,  data, radiology reports, and other tests as discussed during office visits. Pertinent data whether positive or negative were taken in consideration in the process of making this high level medical decision.             (2) assistive person (3) assistive equipment and person

## 2024-10-28 NOTE — PROGRESS NOTE ADULT - SUBJECTIVE AND OBJECTIVE BOX
Patient contacted office for general surgery. Call transferred to correct office.   Brief summary:  85yMale POD# 13 C3L with FEDERICO clip.      Past Medical History:  Atherosclerosis of native coronary artery without angina pectoris  No h/o HF  Family history of heart disease (Sibling, Father)  Handoff  MEWS Score  Stroke syndrome  CAD (coronary artery disease)  LEON (dyspnea on exertion)  Myocardial ischemia  Fatigue  Hypertension  Stented coronary artery  Arthritis  Trigger finger  Pacemaker  HTN (hypertension)  Diabetes mellitus  Afib  Atrial fibrillation, unspecified type  Coronary artery disease of native artery of native heart with stable angina pectoris  Atrial fibrillation, unspecified type  Coronary artery disease of native artery of native heart with stable angina pectoris  CABG, with internal thoracic artery procurement and endoscopic procurement of saphenous vein  Coronary artery disease involving native coronary artery of native heart without angina pectoris  Hypomagnesemia  Malnutrition  Oxygen deficit  Acute respiratory acidosis  Prophylactic measure  Former smoker  Essential hypertension  Pacemaker  Type 2 diabetes mellitus without complication, with long-term current use of insulin  Chronic atrial fibrillation  Stented coronary artery  Cardiogenic shock  S/P CABG x 3  Coronary artery disease involving native coronary artery of native heart without angina pectoris  CABG, with internal thoracic artery procurement and endoscopic procurement of saphenous vein  Status post trigger finger release  S/P angioplasty with stent  Pacemaker  ATHSCL HEART DISEASE OF NATIVE  Bronchitis  Chronic atrial fibrillation        docusate sodium 100 milliGRAM(s) Oral three times a day  dextrose 50% Injectable 50 milliLiter(s) IV Push every 15 minutes  dextrose 50% Injectable 25 milliLiter(s) IV Push every 15 minutes  digoxin     Tablet 0.125 milliGRAM(s) Oral daily  sodium chloride 0.9% lock flush 3 milliLiter(s) IV Push every 8 hours  pantoprazole    Tablet 40 milliGRAM(s) Oral before breakfast  aspirin enteric coated 81 milliGRAM(s) Oral daily  atorvastatin 40 milliGRAM(s) Oral at bedtime  metoprolol 100 milliGRAM(s) Oral two times a day  benzonatate 100 milliGRAM(s) Oral three times a day PRN  oxyCODONE    5 mG/acetaminophen 325 mG 1 Tablet(s) Oral every 6 hours PRN  furosemide    Tablet 40 milliGRAM(s) Oral daily  metFORMIN 850 milliGRAM(s) Oral two times a day with meals  insulin lispro (HumaLOG) corrective regimen sliding scale   SubCutaneous three times a day before meals  benzocaine 15 mG/menthol 3.6 mG Lozenge 1 Lozenge Oral two times a day PRN  MEDICATIONS  (PRN):  benzonatate 100 milliGRAM(s) Oral three times a day PRN Cough  oxyCODONE    5 mG/acetaminophen 325 mG 1 Tablet(s) Oral every 6 hours PRN Moderate Pain (4 - 6)  benzocaine 15 mG/menthol 3.6 mG Lozenge 1 Lozenge Oral two times a day PRN Sore Throat      Daily     Daily Weight in k.1 (15 Aug 2017 05:50)                              11.3   10.5  )-----------( 261      ( 15 Aug 2017 04:12 )             34.5   08-15    138  |  93<L>  |  33.0<H>  ----------------------------<  139<H>  3.9   |  31.0<H>  |  1.06    Ca    8.2<L>      15 Aug 2017 04:12  Phos  2.9     08-14  Mg     1.8     15    TPro  5.9<L>  /  Alb  3.2<L>  /  TBili  0.7  /  DBili  x   /  AST  31  /  ALT  21  /  AlkPhos  76  08-15      PT/INR - ( 15 Aug 2017 05:25 )   PT: 29.3 sec;   INR: 2.61 ratio               Objective:  T(C): 36.5 (08-15-17 @ 10:33), Max: 37.2 (17 @ 15:00)  HR: 81 (08-15-17 @ 10:33) (80 - 104)  BP: 98/65 (08-15-17 @ 10:33) (98/65 - 118/64)  RR: 18 (08-15-17 @ 10:33) (18 - 18)  SpO2: 99% (08-15-17 @ 05:13) (97% - 99%)  Wt(kg): --CAPILLARY BLOOD GLUCOSE  160 (15 Aug 2017 07:30)  135 (14 Aug 2017 17:00)  182 (14 Aug 2017 11:35)      I&O's Summary    14 Aug 2017 07:01  -  15 Aug 2017 07:00  --------------------------------------------------------  IN: 440 mL / OUT: 1401 mL / NET: -961 mL    15 Aug 2017 07:01  -  15 Aug 2017 10:54  --------------------------------------------------------  IN: 0 mL / OUT: 350 mL / NET: -350 mL        Physical Exam  Neuro: A+O x 3, non-focal, speech clear and intact  Pulm: CTA, equal bilaterally  CV: irregular rate, +S1S2  Abd: soft, NT, ND, +BS  Ext: +DP Pulses b/l, +PT pulses, no edema  Inc: MSI C/D/I/stable, site of R EV site clean

## 2024-12-02 NOTE — H&P PST ADULT - OTHER CARE PROVIDERS
Date: 12/1/2024    Time: 9:28 PM    Patient Placed On BIPAP/CPAP/ Non-Invasive Ventilation?  Yes    If no must comment.  Facial area red/color change? No           If YES are Blister/Lesion present?No   If yes must notify nursing staff  BIPAP/CPAP skin barrier?  Yes    Skin barrier type:mepilexlite       Comments:        Sahra Gomez RCP   [place]

## 2025-03-31 NOTE — ASU PATIENT PROFILE, ADULT - FALL HARM RISK TYPE OF ASSESSMENT
Requested medication(s) are due for refill today: Yes  Patient has already received a courtesy refill: No  Other reason request has been forwarded to provider:    Admission